# Patient Record
Sex: MALE | Race: BLACK OR AFRICAN AMERICAN | NOT HISPANIC OR LATINO | URBAN - METROPOLITAN AREA
[De-identification: names, ages, dates, MRNs, and addresses within clinical notes are randomized per-mention and may not be internally consistent; named-entity substitution may affect disease eponyms.]

---

## 2020-02-25 ENCOUNTER — EMERGENCY (EMERGENCY)
Facility: HOSPITAL | Age: 60
LOS: 1 days | Discharge: HOME | End: 2020-02-25
Attending: EMERGENCY MEDICINE | Admitting: EMERGENCY MEDICINE
Payer: MEDICAID

## 2020-02-25 VITALS
SYSTOLIC BLOOD PRESSURE: 129 MMHG | DIASTOLIC BLOOD PRESSURE: 80 MMHG | HEART RATE: 88 BPM | RESPIRATION RATE: 16 BRPM | TEMPERATURE: 99 F | OXYGEN SATURATION: 100 %

## 2020-02-25 VITALS
DIASTOLIC BLOOD PRESSURE: 93 MMHG | OXYGEN SATURATION: 100 % | RESPIRATION RATE: 18 BRPM | TEMPERATURE: 99 F | HEART RATE: 101 BPM | SYSTOLIC BLOOD PRESSURE: 124 MMHG

## 2020-02-25 LAB
ALBUMIN SERPL ELPH-MCNC: 2.8 G/DL — LOW (ref 3.3–5)
ALP SERPL-CCNC: 57 U/L — SIGNIFICANT CHANGE UP (ref 40–120)
ALT FLD-CCNC: 10 U/L — SIGNIFICANT CHANGE UP (ref 4–41)
ANION GAP SERPL CALC-SCNC: 9 MMO/L — SIGNIFICANT CHANGE UP (ref 7–14)
AST SERPL-CCNC: 14 U/L — SIGNIFICANT CHANGE UP (ref 4–40)
BASOPHILS # BLD AUTO: 0.01 K/UL — SIGNIFICANT CHANGE UP (ref 0–0.2)
BASOPHILS NFR BLD AUTO: 0.2 % — SIGNIFICANT CHANGE UP (ref 0–2)
BILIRUB SERPL-MCNC: 0.3 MG/DL — SIGNIFICANT CHANGE UP (ref 0.2–1.2)
BUN SERPL-MCNC: 12 MG/DL — SIGNIFICANT CHANGE UP (ref 7–23)
CA-I BLD-SCNC: 1.32 MMOL/L — HIGH (ref 1.03–1.23)
CALCIUM SERPL-MCNC: 10.3 MG/DL — SIGNIFICANT CHANGE UP (ref 8.4–10.5)
CHLORIDE SERPL-SCNC: 100 MMOL/L — SIGNIFICANT CHANGE UP (ref 98–107)
CO2 SERPL-SCNC: 23 MMOL/L — SIGNIFICANT CHANGE UP (ref 22–31)
CREAT SERPL-MCNC: 0.98 MG/DL — SIGNIFICANT CHANGE UP (ref 0.5–1.3)
EOSINOPHIL # BLD AUTO: 0 K/UL — SIGNIFICANT CHANGE UP (ref 0–0.5)
EOSINOPHIL NFR BLD AUTO: 0 % — SIGNIFICANT CHANGE UP (ref 0–6)
GLUCOSE SERPL-MCNC: 109 MG/DL — HIGH (ref 70–99)
HCT VFR BLD CALC: 34.2 % — LOW (ref 39–50)
HGB BLD-MCNC: 10.7 G/DL — LOW (ref 13–17)
IMM GRANULOCYTES NFR BLD AUTO: 0.2 % — SIGNIFICANT CHANGE UP (ref 0–1.5)
LYMPHOCYTES # BLD AUTO: 1.93 K/UL — SIGNIFICANT CHANGE UP (ref 1–3.3)
LYMPHOCYTES # BLD AUTO: 36.6 % — SIGNIFICANT CHANGE UP (ref 13–44)
MCHC RBC-ENTMCNC: 30.7 PG — SIGNIFICANT CHANGE UP (ref 27–34)
MCHC RBC-ENTMCNC: 31.3 % — LOW (ref 32–36)
MCV RBC AUTO: 98 FL — SIGNIFICANT CHANGE UP (ref 80–100)
MONOCYTES # BLD AUTO: 0.41 K/UL — SIGNIFICANT CHANGE UP (ref 0–0.9)
MONOCYTES NFR BLD AUTO: 7.8 % — SIGNIFICANT CHANGE UP (ref 2–14)
NEUTROPHILS # BLD AUTO: 2.92 K/UL — SIGNIFICANT CHANGE UP (ref 1.8–7.4)
NEUTROPHILS NFR BLD AUTO: 55.2 % — SIGNIFICANT CHANGE UP (ref 43–77)
NRBC # FLD: 0 K/UL — SIGNIFICANT CHANGE UP (ref 0–0)
PHOSPHATE SERPL-MCNC: 3.8 MG/DL — SIGNIFICANT CHANGE UP (ref 2.5–4.5)
PLATELET # BLD AUTO: 331 K/UL — SIGNIFICANT CHANGE UP (ref 150–400)
PMV BLD: 8.9 FL — SIGNIFICANT CHANGE UP (ref 7–13)
POTASSIUM SERPL-MCNC: 4.5 MMOL/L — SIGNIFICANT CHANGE UP (ref 3.5–5.3)
POTASSIUM SERPL-SCNC: 4.5 MMOL/L — SIGNIFICANT CHANGE UP (ref 3.5–5.3)
PROT SERPL-MCNC: 12.8 G/DL — HIGH (ref 6–8.3)
RBC # BLD: 3.49 M/UL — LOW (ref 4.2–5.8)
RBC # FLD: 14.3 % — SIGNIFICANT CHANGE UP (ref 10.3–14.5)
SODIUM SERPL-SCNC: 132 MMOL/L — LOW (ref 135–145)
WBC # BLD: 5.28 K/UL — SIGNIFICANT CHANGE UP (ref 3.8–10.5)
WBC # FLD AUTO: 5.28 K/UL — SIGNIFICANT CHANGE UP (ref 3.8–10.5)

## 2020-02-25 PROCEDURE — 72192 CT PELVIS W/O DYE: CPT | Mod: 26

## 2020-02-25 PROCEDURE — 99284 EMERGENCY DEPT VISIT MOD MDM: CPT

## 2020-02-25 RX ORDER — OXYCODONE HYDROCHLORIDE 5 MG/1
5 TABLET ORAL ONCE
Refills: 0 | Status: DISCONTINUED | OUTPATIENT
Start: 2020-02-25 | End: 2020-02-25

## 2020-02-25 RX ADMIN — OXYCODONE HYDROCHLORIDE 5 MILLIGRAM(S): 5 TABLET ORAL at 11:15

## 2020-02-25 NOTE — ED PROVIDER NOTE - ATTENDING CONTRIBUTION TO CARE
I performed a history and physical exam of the patient and discussed their management with the resident and /or advanced care provider. I reviewed the resident and /or ACP's note and agree with the documented findings and plan of care. My medical decision making and observations are found above.  Lungs clear, nl speech. can walk but with pain.

## 2020-02-25 NOTE — ED PROVIDER NOTE - OBJECTIVE STATEMENT
59M, hx multiple myeloma (not currently on treatment), HTN presents w chief complaint of low back pain with BL hip pain. Patient reports few months of ongoing intermittent low back pain, with radiation to BL hips. Pain has acutely worsened since Sunday. Patient with worsening pain, worse pain with ambulation. No reported recent falls, trauma, heavy lifting. No reported fevers or chills, nausea or vomiting, headache, dizziness, lightheadedness, blurry vision, chest pain, cough, shortness of breath, diarrhea, constipation, dysuria, hematuria, incontience, numbness or weakness to lower extremities. No reported allergies. Former smoker. Took oxycodone last week, no painmeds this week yet.

## 2020-02-25 NOTE — ED PROVIDER NOTE - PROGRESS NOTE DETAILS
Patient resting in bed, moderate pain improvement  CT with "1.  Diffuse lytic lesions throughout the pelvis and lower lumbar spine with a permeative appearance of the overlying cortex suspicious for diffuse metastatic disease versus myeloma. 2.  Pathologic compression fracture of the L4 vertebra 3.  Severe right and moderate left hip arthrosis." Patient resting in bed, moderate pain improvement  CT with "1.  Diffuse lytic lesions throughout the pelvis and lower lumbar spine with a permeative appearance of the overlying cortex suspicious for diffuse metastatic disease versus myeloma. 2.  Pathologic compression fracture of the L4 vertebra 3.  Severe right and moderate left hip arthrosis."  Will d/c home at this time with Heme/Onc follow up. patient understands and in agreement w plan  return precautions given to patient   Darvin Erickson MD, PGY3 Emergency Medicine

## 2020-02-25 NOTE — ED PROVIDER NOTE - PATIENT PORTAL LINK FT
You can access the FollowMyHealth Patient Portal offered by [facility] by registering at the following website: [email or web address]. By joining [employer]’s FollowMyHealth portal, you will also be able to view your health information using other applications (apps) compatible with our system.

## 2020-02-25 NOTE — ED PROVIDER NOTE - CLINICAL SUMMARY MEDICAL DECISION MAKING FREE TEXT BOX
Dimitrios: pt with mult myeloma with increasing low back/ pelvis pain. no fever, no neuro changes. will treat pain and image will look at labe for kidney fxn and Ca.

## 2020-02-25 NOTE — PROVIDER CONTACT NOTE (OTHER) - ASSESSMENT
marlene met with the pt at bedside.  Pt requesting assistance with applying for food stamps.  marlene gave packet on process to apply for SNAP.  marlene also gave pt info on pantries in [place] as well as [employer].  marlene sent a email to [employer]ne[employer] requesting a MEP worker to visit with pt.

## 2020-02-25 NOTE — ED ADULT TRIAGE NOTE - CHIEF COMPLAINT QUOTE
pt with multiple myeloma, c/o back pain radiating to the hips.  pt is non-compliant with [place] meds and HTN medication

## 2020-02-25 NOTE — ED ADULT NURSE REASSESSMENT NOTE - NS ED NURSE REASSESS COMMENT FT1
Received report from TALISHA Lemus. Pt [id number]. Pt to be discharged at this time. NAD at present. Pt requesting to speak to  regarding benefits such as food stamps. Meseret GUILLORY contacted and will come speak to pt prior to DC.

## 2020-02-25 NOTE — ED ADULT NURSE NOTE - NSIMPLEMENTINTERV_GEN_ALL_ED
Implemented All Fall Risk Interventions:  [place] to call system. Call bell, personal items and telephone within reach. Instruct patient to call for assistance. Room bathroom lighting operational. Non-slip footwear when patient is off stretcher. Physically safe environment: no spills, clutter or unnecessary equipment. Stretcher in lowest position, wheels locked, appropriate side rails in place. Provide visual cue, wrist band, yellow gown, etc. Monitor gait and stability. Monitor for mental status changes and reorient to person, place, and time. Review medications for side effects contributing to fall risk. Reinforce activity limits and safety measures with patient and family.

## 2020-02-25 NOTE — ED ADULT NURSE NOTE - OBJECTIVE STATEMENT
Pt received to room AAO x 3 c/o lower back pain and bilateral hip pain x few days that is worse the past few days and unrelieved by oxycodone. Pt denies any recent injury or truama. No weakness noted, pt able to ambulate short distance with pain. PMH multiple myeloma not receiving treatment and HTN.

## 2020-02-25 NOTE — ED PROVIDER NOTE - NSFOLLOWUPINSTRUCTIONS_ED_ALL_ED_FT
Please follow up with your primary care physician for further care and evaluation.  If testing was performed in the Emergency Department, please bring copies of all test results to your doctor.  Please continue to take all home medications as previously prescribed.    PLEASE FOLLOW UP WITH HEMATOLOGY AND ONCOLOGY FOR FURTHER CARE    Return to hospital for any new or concerning symptoms, including but not limited to: fevers, chills, nausea, vomiting, headache, dizziness, lightheadedness, chest pain, shortness of breath, difficulty breathing, abdominal pain, weakness, or any other new or concerning symptoms.   ===============    To control your pain and/or fever at home, you can take Ibuprofen ('Motrin') 600 mg along with Tylenol 650mg to 1000mg every 6 to 8 hours. Limit your maximum daily Tylenol from all sources to 4000mg. Be aware that many other medications contain acetaminophen which is also known as Tylenol.     If taking Motrin/Ibuprofen, please take with food to minimize risk of stomach./gastric upset and/or injury.  Do not take Motrin/Ibuprofen if you have a history of gastritis and/or gastrointestinal bleed.   If you have a history of kidney disease: please use caution with the extended use of motrin/ibuprofen - speak with your primary care doctor further regarding the use of this medication.  If you have a known history of liver disease: please use caution with the extended use of tylenol/acetaminophen - speak to your primary care doctor further regarding the use of this medication.     Taking Tylenol and Ibuprofen together has been shown to be more effective at relieving pain than taking them separately. These are both over the counter medications that you can  at your local pharmacy without a prescription. You need to respect all of the warnings on the bottles. You shouldn’t take these medications for more than a week without following up with your doctor. Both medications come with certain risks and side effects that you need to discuss with your doctor, especially if you are taking them for a prolonged period.

## 2020-02-25 NOTE — ED PROVIDER NOTE - PHYSICAL EXAMINATION
General: Well appearing, awake, alert, oriented, no acute distress. Resting in bed.  HEENT: PERRLA EOMI. No trauma/bruising noted to head or face. No rhinorrhea noted.   CV: mild tachy rate and reg rhythm, S1/S2, no murmurs/rubs/gallops noted on exam. No tenderness to palpation to chest wall.   Lungs: Clear to ascultation bilaterally, no wheezes/crackles/rales noted on exam. Equal chest wall excursion noted.   Abdomen: Soft, non tender, non distended, no guarding or rebound. No CVA tenderness to palpation.   MSK: Intact ROM of upper and lower extremities bilaterally. Paint to left low back to straight leg raise LLE. No tenderness to palpation to extremities. Intact ROM of neck. No gross deformities noted to extremities. No C-spine or spinal bony tenderness to palpation. Mild BL paraspinal tenderness to palpation. Pelvis stable. Neck supple.   Neuro: Awake, A+O x4, moving all extremities spontaneously. CN 2-12 grossly intact. No nystagmus noted. Strength and sensation grossly intact to all extremities. Ambulatory w/o assist. Speech fluent, no slurred speech.   Extremities: No swelling or edema noted to extremities. No calf tenderness to palpation.   Skin: No rash noted on exam.

## 2020-03-01 ENCOUNTER — OUTPATIENT (OUTPATIENT)
Dept: OUTPATIENT SERVICES | Facility: HOSPITAL | Age: 60
LOS: 1 days | End: 2020-03-01

## 2020-03-28 ENCOUNTER — INPATIENT (INPATIENT)
Facility: HOSPITAL | Age: 60
LOS: 11 days | Discharge: HOME HEALTH | End: 2020-04-09
Attending: STUDENT IN AN ORGANIZED HEALTH CARE EDUCATION/TRAINING PROGRAM | Admitting: STUDENT IN AN ORGANIZED HEALTH CARE EDUCATION/TRAINING PROGRAM
Payer: MEDICAID

## 2020-03-28 VITALS
RESPIRATION RATE: 16 BRPM | SYSTOLIC BLOOD PRESSURE: 158 MMHG | TEMPERATURE: 98 F | DIASTOLIC BLOOD PRESSURE: 109 MMHG | HEART RATE: 110 BPM | OXYGEN SATURATION: 100 %

## 2020-03-28 DIAGNOSIS — C90.00 MULTIPLE MYELOMA NOT HAVING ACHIEVED REMISSION: ICD-10-CM

## 2020-03-28 PROBLEM — I10 ESSENTIAL (PRIMARY) HYPERTENSION: Chronic | Status: ACTIVE | Noted: 2020-02-25

## 2020-03-28 LAB
ALBUMIN SERPL ELPH-MCNC: 2.8 G/DL — LOW (ref 3.3–5)
ALP SERPL-CCNC: 53 U/L — SIGNIFICANT CHANGE UP (ref 40–120)
ALT FLD-CCNC: < 5 U/L — SIGNIFICANT CHANGE UP (ref 4–41)
ANION GAP SERPL CALC-SCNC: 7 MMO/L — SIGNIFICANT CHANGE UP (ref 7–14)
AST SERPL-CCNC: 7 U/L — SIGNIFICANT CHANGE UP (ref 4–40)
BASOPHILS # BLD AUTO: 0 K/UL — SIGNIFICANT CHANGE UP (ref 0–0.2)
BASOPHILS NFR BLD AUTO: 0 % — SIGNIFICANT CHANGE UP (ref 0–2)
BILIRUB SERPL-MCNC: < 0.2 MG/DL — LOW (ref 0.2–1.2)
BUN SERPL-MCNC: 10 MG/DL — SIGNIFICANT CHANGE UP (ref 7–23)
CA-I BLD-SCNC: 1.22 MMOL/L — SIGNIFICANT CHANGE UP (ref 1.03–1.23)
CALCIUM SERPL-MCNC: 9.5 MG/DL — SIGNIFICANT CHANGE UP (ref 8.4–10.5)
CHLORIDE SERPL-SCNC: 100 MMOL/L — SIGNIFICANT CHANGE UP (ref 98–107)
CK SERPL-CCNC: 91 U/L — SIGNIFICANT CHANGE UP (ref 30–200)
CO2 SERPL-SCNC: 26 MMOL/L — SIGNIFICANT CHANGE UP (ref 22–31)
CREAT SERPL-MCNC: 0.84 MG/DL — SIGNIFICANT CHANGE UP (ref 0.5–1.3)
EOSINOPHIL # BLD AUTO: 0.01 K/UL — SIGNIFICANT CHANGE UP (ref 0–0.5)
EOSINOPHIL NFR BLD AUTO: 0.1 % — SIGNIFICANT CHANGE UP (ref 0–6)
GLUCOSE SERPL-MCNC: 131 MG/DL — HIGH (ref 70–99)
HCT VFR BLD CALC: 29.4 % — LOW (ref 39–50)
HGB BLD-MCNC: 9.4 G/DL — LOW (ref 13–17)
IMM GRANULOCYTES NFR BLD AUTO: 0.5 % — SIGNIFICANT CHANGE UP (ref 0–1.5)
LYMPHOCYTES # BLD AUTO: 2.59 K/UL — SIGNIFICANT CHANGE UP (ref 1–3.3)
LYMPHOCYTES # BLD AUTO: 33.1 % — SIGNIFICANT CHANGE UP (ref 13–44)
MAGNESIUM SERPL-MCNC: 1.7 MG/DL — SIGNIFICANT CHANGE UP (ref 1.6–2.6)
MCHC RBC-ENTMCNC: 31.4 PG — SIGNIFICANT CHANGE UP (ref 27–34)
MCHC RBC-ENTMCNC: 32 % — SIGNIFICANT CHANGE UP (ref 32–36)
MCV RBC AUTO: 98.3 FL — SIGNIFICANT CHANGE UP (ref 80–100)
MONOCYTES # BLD AUTO: 0.47 K/UL — SIGNIFICANT CHANGE UP (ref 0–0.9)
MONOCYTES NFR BLD AUTO: 6 % — SIGNIFICANT CHANGE UP (ref 2–14)
NEUTROPHILS # BLD AUTO: 4.71 K/UL — SIGNIFICANT CHANGE UP (ref 1.8–7.4)
NEUTROPHILS NFR BLD AUTO: 60.3 % — SIGNIFICANT CHANGE UP (ref 43–77)
NRBC # FLD: 0.03 K/UL — SIGNIFICANT CHANGE UP (ref 0–0)
PHOSPHATE SERPL-MCNC: 3.1 MG/DL — SIGNIFICANT CHANGE UP (ref 2.5–4.5)
PLATELET # BLD AUTO: 376 K/UL — SIGNIFICANT CHANGE UP (ref 150–400)
PMV BLD: 9 FL — SIGNIFICANT CHANGE UP (ref 7–13)
POTASSIUM SERPL-MCNC: 3.6 MMOL/L — SIGNIFICANT CHANGE UP (ref 3.5–5.3)
POTASSIUM SERPL-SCNC: 3.6 MMOL/L — SIGNIFICANT CHANGE UP (ref 3.5–5.3)
PROT SERPL-MCNC: 13.6 G/DL — HIGH (ref 6–8.3)
RBC # BLD: 2.99 M/UL — LOW (ref 4.2–5.8)
RBC # FLD: 14.6 % — HIGH (ref 10.3–14.5)
SODIUM SERPL-SCNC: 133 MMOL/L — LOW (ref 135–145)
WBC # BLD: 7.82 K/UL — SIGNIFICANT CHANGE UP (ref 3.8–10.5)
WBC # FLD AUTO: 7.82 K/UL — SIGNIFICANT CHANGE UP (ref 3.8–10.5)

## 2020-03-28 RX ORDER — MORPHINE SULFATE 50 MG/1
2 CAPSULE, EXTENDED RELEASE ORAL ONCE
Refills: 0 | Status: DISCONTINUED | OUTPATIENT
Start: 2020-03-28 | End: 2020-03-28

## 2020-03-28 RX ADMIN — MORPHINE SULFATE 2 MILLIGRAM(S): 50 CAPSULE, EXTENDED RELEASE ORAL at 19:50

## 2020-03-28 RX ADMIN — MORPHINE SULFATE 2 MILLIGRAM(S): 50 CAPSULE, EXTENDED RELEASE ORAL at 18:55

## 2020-03-28 NOTE — ED ADULT NURSE NOTE - NSIMPLEMENTINTERV_GEN_ALL_ED
Implemented All Universal Safety Interventions:  [place] to call system. Call bell, personal items and telephone within reach. Instruct patient to call for assistance. Room bathroom lighting operational. Non-slip footwear when patient is off stretcher. Physically safe environment: no spills, clutter or unnecessary equipment. Stretcher in lowest position, wheels locked, appropriate side rails in place.

## 2020-03-28 NOTE — ED PROVIDER NOTE - OBJECTIVE STATEMENT
Pt is a 58 yo M w/ PMHx multiple myeloma (not currently on treatment), HTN presents w chief complaint R sided rib pain and R hip pain. Pt states he has had the pain for a number of weeks but it became worse today. He is not able to ambulate on account of the pain. Denies LE weakness, numbness, tingling, no bowel/fecal incontinence. Pt does not follow with H/O for his MM.  No recent trauma. He was seen in the ED last in February with a similar complaint Pt is a 60 yo M w/ PMHx multiple myeloma (not currently on treatment), HTN presents w chief complaint R sided rib pain and R hip pain. Pt states he has had the pain for a number of weeks but it became worse today. He is not able to ambulate on account of the pain. Denies LE weakness, numbness, tingling, no bowel/fecal incontinence. Pt does not follow with H/O for his MM.  No recent trauma. He was seen in the ED last in February with a similar complaint, CT pelvis at the time showed diffuse lytic lesions throughout the pelvis and lower lumbar spine, pathologic compression fracture of L4, and severe R and moderate L hip arthrosis.

## 2020-03-28 NOTE — ED PROVIDER NOTE - PHYSICAL EXAMINATION
GENERAL: uncomfortable  HEAD:  Atraumatic, Normocephalic  EYES: EOMI, conjunctiva and sclera clear  ENMT: Moist mucous membranes  NECK: Supple   HEART: Regular rate and rhythm; No murmurs, rubs, or gallops  RESPIRATORY: CTA B/L   ABDOMEN: Soft, Nontender, Nondistended   NEUROLOGY: A&Ox3, nonfocal, moving all extremities. reduced strength   EXTREMITIES:  2+ Peripheral Pulses, No clubbing, cyanosis, or edema. no gross deformities. 4/5 strength b/l LE  SKIN: warm, dry, normal color, no rash

## 2020-03-28 NOTE — ED PROVIDER NOTE - ATTENDING CONTRIBUTION TO CARE
Dr. Barron:  I have personally performed a face to face bedside history and physical examination of this patient. I have discussed the history, examination, review of systems, assessment and plan of management with the resident. I have reviewed the electronic medical record and amended it to reflect my history, review of systems, physical exam, assessment and plan.    59M h/o multiple myeloma not on treatment x 1 year, htn, presents with worsening right rib cage and hip pain over past few weeks.  States he came in because pain worse today and has not been able to ambulate.  Last seen in February and had CT showing "1.  Diffuse lytic lesions throughout the pelvis and lower lumbar spine with a permeative appearance of the overlying cortex suspicious for diffuse metastatic disease versus myeloma. 2.  Pathologic compression fracture of the L4 vertebra.  3.  Severe right and moderate left hip arthrosis."      Exam:  - nad, cachectic  - rrr  - ctab  - abd soft ntnd  - 4/5 strength BLE, no sensory deficits, no incontinence    A/P  - pain likely secondary to pathologic fractures and lytic lesions  - as unable to ambulate, admit for pain control and further management

## 2020-03-28 NOTE — ED ADULT NURSE NOTE - OBJECTIVE STATEMENT
Received spot 12 B.  AOx4, skin warm, dry and intact.  Pt presents with c/o difficulty walking x 1 week with pain to lower back and shoulder.  IV access obtained.  Labs drawn and sent.  Pt medicated for pain as per EMAR.  Currently eating food tray provide

## 2020-03-28 NOTE — ED PROVIDER NOTE - NS ED ROS FT
CONSTITUTIONAL: No weakness, fevers or chills  EYES/ENT: No visual changes   NECK: No pain or stiffness  RESPIRATORY: No cough, SOB  CARDIOVASCULAR: No chest pain or palpitations  GASTROINTESTINAL: No abdominal or epigastric pain. No nausea, vomiting, or hematemesis; No diarrhea or constipation. No melena or hematochezia.  GENITOURINARY: No dysuria, frequency or hematuria  NEUROLOGICAL: No numbness or weakness  SKIN: No rashes

## 2020-03-29 DIAGNOSIS — C90.00 MULTIPLE MYELOMA NOT HAVING ACHIEVED REMISSION: ICD-10-CM

## 2020-03-29 DIAGNOSIS — Z29.9 ENCOUNTER FOR PROPHYLACTIC MEASURES, UNSPECIFIED: ICD-10-CM

## 2020-03-29 DIAGNOSIS — I10 ESSENTIAL (PRIMARY) HYPERTENSION: ICD-10-CM

## 2020-03-29 DIAGNOSIS — R62.7 ADULT FAILURE TO THRIVE: ICD-10-CM

## 2020-03-29 DIAGNOSIS — R52 PAIN, UNSPECIFIED: ICD-10-CM

## 2020-03-29 PROCEDURE — 12345: CPT | Mod: NC

## 2020-03-29 PROCEDURE — 99223 1ST HOSP IP/OBS HIGH 75: CPT

## 2020-03-29 RX ORDER — OXYCODONE HYDROCHLORIDE 5 MG/1
5 TABLET ORAL EVERY 4 HOURS
Refills: 0 | Status: DISCONTINUED | OUTPATIENT
Start: 2020-03-29 | End: 2020-04-02

## 2020-03-29 RX ORDER — INFLUENZA VIRUS VACCINE 15; 15; 15; 15 UG/.5ML; UG/.5ML; UG/.5ML; UG/.5ML
0.5 SUSPENSION INTRAMUSCULAR ONCE
Refills: 0 | Status: DISCONTINUED | OUTPATIENT
Start: 2020-03-29 | End: 2020-04-09

## 2020-03-29 RX ORDER — MORPHINE SULFATE 50 MG/1
2 CAPSULE, EXTENDED RELEASE ORAL
Refills: 0 | Status: DISCONTINUED | OUTPATIENT
Start: 2020-03-29 | End: 2020-04-02

## 2020-03-29 RX ORDER — SODIUM CHLORIDE 9 MG/ML
1000 INJECTION INTRAMUSCULAR; INTRAVENOUS; SUBCUTANEOUS
Refills: 0 | Status: DISCONTINUED | OUTPATIENT
Start: 2020-03-29 | End: 2020-04-04

## 2020-03-29 RX ORDER — ACETAMINOPHEN 500 MG
650 TABLET ORAL EVERY 4 HOURS
Refills: 0 | Status: DISCONTINUED | OUTPATIENT
Start: 2020-03-29 | End: 2020-04-09

## 2020-03-29 RX ORDER — HEPARIN SODIUM 5000 [USP'U]/ML
5000 INJECTION INTRAVENOUS; SUBCUTANEOUS EVERY 8 HOURS
Refills: 0 | Status: DISCONTINUED | OUTPATIENT
Start: 2020-03-29 | End: 2020-04-09

## 2020-03-29 RX ADMIN — OXYCODONE HYDROCHLORIDE 5 MILLIGRAM(S): 5 TABLET ORAL at 00:41

## 2020-03-29 RX ADMIN — HEPARIN SODIUM 5000 UNIT(S): 5000 INJECTION INTRAVENOUS; SUBCUTANEOUS at 15:50

## 2020-03-29 RX ADMIN — HEPARIN SODIUM 5000 UNIT(S): 5000 INJECTION INTRAVENOUS; SUBCUTANEOUS at 05:03

## 2020-03-29 RX ADMIN — OXYCODONE HYDROCHLORIDE 5 MILLIGRAM(S): 5 TABLET ORAL at 20:56

## 2020-03-29 RX ADMIN — Medication 650 MILLIGRAM(S): at 09:48

## 2020-03-29 RX ADMIN — OXYCODONE HYDROCHLORIDE 5 MILLIGRAM(S): 5 TABLET ORAL at 22:11

## 2020-03-29 RX ADMIN — SODIUM CHLORIDE 100 MILLILITER(S): 9 INJECTION INTRAMUSCULAR; INTRAVENOUS; SUBCUTANEOUS at 03:11

## 2020-03-29 RX ADMIN — Medication 650 MILLIGRAM(S): at 09:18

## 2020-03-29 NOTE — H&P ADULT - PROBLEM SELECTOR PLAN 3
-Patient reports not currently receiving treatment  -Likely needs heme/onc follow up to establish care

## 2020-03-29 NOTE — PROGRESS NOTE ADULT - PROBLEM SELECTOR PLAN 3
-Patient reports not currently receiving treatment  -plan to contact Dr Knox ([facility]) pt's hematologist to evaulate pt if he comes to [facility]; otherwise house heme -Patient reports not currently receiving treatment  -plan to contact Dr Knox ([facility]) pt's hematologist to evaluate pt if he comes to [facility]; otherwise house heme

## 2020-03-29 NOTE — H&P ADULT - NSHPPHYSICALEXAM_GEN_ALL_CORE
Physical exam:  Vital signs reviewed as below:  T(C): 37.1 (03-29-20 @ 00:36), Max: 37.5 (03-28-20 @ 19:08)  HR: 94 (03-29-20 @ 00:36) (94 - 110)  BP: 149/103 (03-29-20 @ 00:36) (135/88 - 158/109)  RR: 16 (03-29-20 @ 00:36) (16 - 19)  SpO2: 100% (03-29-20 @ 00:36) (98% - 100%)  Constitutional-awake, alert, not in acute distress  Neuro-awake, alert,  appropriately interactive, moving all extremities, face symmetric  Eyes-pupils equally round and reactive to light, non icteric, no discharge noted  Ears, nose, mouth, throat-no abnormal discharge, moist mucous membranes, oropharynx clear without noted exudate  CV-tachy rate and regular rhythm, no rubs, murmurs, gallops appreciated, no lower extremity edema, chest pain reproduced on exam  Resp-clear to auscultation bilaterally, normal respiratory effort,   GI-abdomen soft and nontender, no rebound or guarding present  -not examined  MSK-pain in rib and hip reproduced with palpation  Skin-warm, dry, no rash appreciated  Psych-calm, cooperative, normal affect, not attending to internal stimuli

## 2020-03-29 NOTE — CHART NOTE - NSCHARTNOTEFT_GEN_A_CORE
House oncology consulted. However, pt was previously followed by Dr. Terry ([phone], [facility] in [place]). Called office to discuss with Dr. Terry however office closed. Per discussion with onc fellow, Dr. Terry may have privileges at [facility]. Primary team to follow up tomorrow with Dr. Terry, if he has privileges here then he will resume care of patient. If not, then house oncology can take over.

## 2020-03-29 NOTE — PROGRESS NOTE ADULT - SUBJECTIVE AND OBJECTIVE BOX
[facility] Division of Hospital Medicine  Ozzy Canas MD  Pager [phone]    Patient is a 59y old  Male who presents with a chief complaint of CC: pain (29 Mar 2020 00:43)      SUBJECTIVE / OVERNIGHT EVENTS: Pain controlled      MEDICATIONS  (STANDING):  heparin  Injectable 5000 Unit(s) SubCutaneous every 8 hours  influenza   Vaccine 0.5 milliLiter(s) IntraMuscular once  sodium chloride 0.9%. 1000 milliLiter(s) (100 mL/Hr) IV Continuous <Continuous>    MEDICATIONS  (PRN):  acetaminophen   Tablet .. 650 milliGRAM(s) Oral every 4 hours PRN Mild Pain (1 - 3)  morphine  - Injectable 2 milliGRAM(s) IV Push every 2 hours PRN Severe Pain (7 - 10)  oxyCODONE    IR 5 milliGRAM(s) Oral every 4 hours PRN Moderate Pain (4 - 6)      CAPILLARY BLOOD GLUCOSE        I&O's Summary      PHYSICAL EXAM:  Vital Signs Last 24 Hrs  T(C): 36.8 (29 Mar 2020 15:54), Max: 37.5 (28 Mar 2020 19:08)  T(F): 98.2 (29 Mar 2020 15:54), Max: 99.5 (28 Mar 2020 19:08)  HR: 93 (29 Mar 2020 15:54) (71 - 102)  BP: 122/81 (29 Mar 2020 15:54) (122/81 - 150/100)  BP(mean): --  RR: 17 (29 Mar 2020 15:54) (16 - 19)  SpO2: 100% (29 Mar 2020 15:54) (96% - 100%)  CONSTITUTIONAL: NAD  EYES: EOMI, conjunctiva and sclera clear  ENMT: Moist oral mucosa  NECK: Supple  RESPIRATORY: Breathing unlabored, CTAB  CARDIOVASCULAR: S1S2 no MRG  ABDOMEN: Nontender to palpation, normoactive bowel sounds, no rebound/guarding  MUSCULOSKELETAL: no clubbing or cyanosis of digits  NEUROLOGY: No focal deficits , no signs cord compression  SKIN: No rashes or lesions    LABS:                        9.4    7.82  )-----------( 376      ( 28 Mar 2020 18:50 )             29.4     03-28    133<L>  |  100  |  10  ----------------------------<  131<H>  3.6   |  26  |  0.84    Ca    9.5      28 Mar 2020 18:35  Phos  3.1     03-28  Mg     1.7     03-28    TPro  13.6<H>  /  Alb  2.8<L>  /  TBili  < 0.2<L>  /  DBili  x   /  AST  7   /  ALT  < 5  /  AlkPhos  53  03-28      CARDIAC MARKERS ( 28 Mar 2020 18:35 )  x     / x     / 91 u/L / x     / x        CODE: FULL

## 2020-03-29 NOTE — H&P ADULT - ASSESSMENT
59 year old man with a history of multiple myeloma (not in treatment) and HTN who presents for uncontrolled pain.

## 2020-03-29 NOTE — H&P ADULT - NSHPLABSRESULTS_GEN_ALL_CORE
Diagnostics reviewed and remarkable for:  CBC w/ hgb 9.4  BMP wnl  LFT w/ protein 13.6, albumin 2.8  Mg 1.7  Phos 3.1  CK - 91  CT pelvis from 2/25 with diffuse lytic lesions in the pelvis and lower lumbar spine, L4 compression fracture present, severe R and moderate L hip arthrosis

## 2020-03-29 NOTE — H&P ADULT - NSHPREVIEWOFSYSTEMS_GEN_ALL_CORE
ROS:  Constitutional:  (+) none; (-) fevers, chills, sweats, unintentional weight loss, fatigue, sick contacts, recent travel, trauma  Ears/Nose/Mouth/Throat: (+) none; (-) throat pain, rhinorrhea, dysphagia/odynophagia  CV: (+) chest pain, (-) palpitations, lower extremity edema, orthopnea, PND  Resp: (+) none; (-) shortness of breath, cough  GI: (+) none; (-) abdominal pain, nausea, vomitting, diarrhea, constipation, melana, hematochezia  : (+) none; (-) dysuria, hematuria  MSK: (+) R hip pain, R rib pain (-) joint swelling  Skin: (+) none; (-) rash, new yellowing/darkening of skin  Neuro: (+) HA, (-) vision changes, weakness, confusion, lightheadedness/dizziness, numbness, tingling, incontinence  Endo: (+) none; (-) heat/cold intolerance, recent skin/hair/nail changes  Heme/Lymph: (+) none; (-) swollen lymph nodes, night sweats

## 2020-03-29 NOTE — H&P ADULT - PROBLEM SELECTOR PLAN 1
-Likely related to known lytic bone lesions from MM and possibly with pathologic fracture  -Pain management prn orders in place  -Will get initial evaluation with XR chest and hip

## 2020-03-29 NOTE — H&P ADULT - PROBLEM SELECTOR PLAN 2
-Reports poor PO and inability to ambulate due to pain likely from MM  -Pain management  -Maintenance IV fluids  -If XR without fracture, consider PT/OT

## 2020-03-29 NOTE — H&P ADULT - HISTORY OF PRESENT ILLNESS
Rolf Robert is a 59 year old man with a history of multiple myeloma (not in treatment) and HTN who presents for uncontrolled pain.    He was recently in the ED with similar complaints with a CT pelvis 2/25 showing diffuse lytic lesions in the pelvis and lower lumbar spine and an L4 compression fracture, also severe R and moderate L hip arthrosis.  He was discharged home with plan for oncology follow up.    Regarding his pain, he describes months of sharp pain worsening over the last 2-3 weeks and uncontrolled by the advil that he was trying at home.  He denies any fevers, chills, cough. Rates it as an 8/10 pain worse with palpation.  Due to his pain, he has been unable to walk.  Due to this, he called EMS and was brought to [facility] for evaluation.    In the ED, he was afebrile, tachycardic to 102-110, with otherwise unremarkable vital signs.  Diagnostics revealed a Hgb 9.4, protein 13.6, albumin 2.8, CK 91.  He was given morphine admitted for further management.    On evaluation, he gave the above history.

## 2020-03-30 DIAGNOSIS — C90.00 MULTIPLE MYELOMA NOT HAVING ACHIEVED REMISSION: ICD-10-CM

## 2020-03-30 LAB
ANION GAP SERPL CALC-SCNC: 7 MMO/L — SIGNIFICANT CHANGE UP (ref 7–14)
BUN SERPL-MCNC: 10 MG/DL — SIGNIFICANT CHANGE UP (ref 7–23)
CALCIUM SERPL-MCNC: 9.7 MG/DL — SIGNIFICANT CHANGE UP (ref 8.4–10.5)
CHLORIDE SERPL-SCNC: 103 MMOL/L — SIGNIFICANT CHANGE UP (ref 98–107)
CO2 SERPL-SCNC: 23 MMOL/L — SIGNIFICANT CHANGE UP (ref 22–31)
CREAT SERPL-MCNC: 0.9 MG/DL — SIGNIFICANT CHANGE UP (ref 0.5–1.3)
GLUCOSE SERPL-MCNC: 87 MG/DL — SIGNIFICANT CHANGE UP (ref 70–99)
HCT VFR BLD CALC: 29.2 % — LOW (ref 39–50)
HCV AB S/CO SERPL IA: 0.04 S/CO — SIGNIFICANT CHANGE UP (ref 0–0.99)
HCV AB SERPL-IMP: SIGNIFICANT CHANGE UP
HGB BLD-MCNC: 9.2 G/DL — LOW (ref 13–17)
MCHC RBC-ENTMCNC: 30.8 PG — SIGNIFICANT CHANGE UP (ref 27–34)
MCHC RBC-ENTMCNC: 31.5 % — LOW (ref 32–36)
MCV RBC AUTO: 97.7 FL — SIGNIFICANT CHANGE UP (ref 80–100)
NRBC # FLD: 0 K/UL — SIGNIFICANT CHANGE UP (ref 0–0)
PLATELET # BLD AUTO: 347 K/UL — SIGNIFICANT CHANGE UP (ref 150–400)
PMV BLD: 8.8 FL — SIGNIFICANT CHANGE UP (ref 7–13)
POTASSIUM SERPL-MCNC: 3.7 MMOL/L — SIGNIFICANT CHANGE UP (ref 3.5–5.3)
POTASSIUM SERPL-SCNC: 3.7 MMOL/L — SIGNIFICANT CHANGE UP (ref 3.5–5.3)
RBC # BLD: 2.99 M/UL — LOW (ref 4.2–5.8)
RBC # FLD: 14.8 % — HIGH (ref 10.3–14.5)
SODIUM SERPL-SCNC: 133 MMOL/L — LOW (ref 135–145)
WBC # BLD: 5.78 K/UL — SIGNIFICANT CHANGE UP (ref 3.8–10.5)
WBC # FLD AUTO: 5.78 K/UL — SIGNIFICANT CHANGE UP (ref 3.8–10.5)

## 2020-03-30 PROCEDURE — 99233 SBSQ HOSP IP/OBS HIGH 50: CPT

## 2020-03-30 PROCEDURE — 73521 X-RAY EXAM HIPS BI 2 VIEWS: CPT | Mod: 26

## 2020-03-30 PROCEDURE — 71046 X-RAY EXAM CHEST 2 VIEWS: CPT | Mod: 26

## 2020-03-30 RX ADMIN — OXYCODONE HYDROCHLORIDE 5 MILLIGRAM(S): 5 TABLET ORAL at 13:25

## 2020-03-30 RX ADMIN — HEPARIN SODIUM 5000 UNIT(S): 5000 INJECTION INTRAVENOUS; SUBCUTANEOUS at 21:27

## 2020-03-30 RX ADMIN — HEPARIN SODIUM 5000 UNIT(S): 5000 INJECTION INTRAVENOUS; SUBCUTANEOUS at 05:12

## 2020-03-30 RX ADMIN — HEPARIN SODIUM 5000 UNIT(S): 5000 INJECTION INTRAVENOUS; SUBCUTANEOUS at 14:45

## 2020-03-30 NOTE — DIETITIAN INITIAL EVALUATION ADULT. - DIET TYPE
PO diet supplement (specify)/Ensure Enlive 8oz. 2x daily (will provide additional ~700 Kcal, ~40 gm Protein)/DASH/TLC (sodium and cholesterol restricted diet)

## 2020-03-30 NOTE — PROGRESS NOTE ADULT - SUBJECTIVE AND OBJECTIVE BOX
Patient with persistent pain in back and hip.  Patient has history of multiple myeloma.  he had one course of chemo but developed burning skin and stopped.  Transplant was recommended but the patient refused. Now with persistent pain causing difficulty walking.    Vital Signs Last 24 Hrs  T(C): 36.8 (31 Mar 2020 06:57), Max: 37.4 (30 Mar 2020 15:53)  T(F): 98.2 (31 Mar 2020 06:57), Max: 99.4 (30 Mar 2020 15:53)  HR: 85 (31 Mar 2020 06:57) (85 - 91)  BP: 133/94 (31 Mar 2020 06:57) (133/94 - 144/98)  BP(mean): --  RR: 18 (31 Mar 2020 06:57) (17 - 18)  SpO2: 98% (31 Mar 2020 06:57) (98% - 100%)  Daily     Daily Weight in k.6 (30 Mar 2020 11:50)  I&O's Summary      Neck: no bruits, JVD, adenopathy  Chest: clear  Cor: [id number], RR, normal S1S2 with no mrght  Abd: soft, nontender, BS+ and no HSM  Ext: w/o CCE  Pulses:2+->dp bilaterally    MEDICATIONS  (STANDING):  heparin  Injectable 5000 Unit(s) SubCutaneous every 8 hours  influenza   Vaccine 0.5 milliLiter(s) IntraMuscular once  sodium chloride 0.9%. 1000 milliLiter(s) (100 mL/Hr) IV Continuous <Continuous>    MEDICATIONS  (PRN):  acetaminophen   Tablet .. 650 milliGRAM(s) Oral every 4 hours PRN Mild Pain (1 - 3)  morphine  - Injectable 2 milliGRAM(s) IV Push every 2 hours PRN Severe Pain (7 - 10)  oxyCODONE    IR 5 milliGRAM(s) Oral every 4 hours PRN Moderate Pain (4 - 6)          132<L>  |  101  |  15  ----------------------------<  84  3.9   |  23  |  0.90    Ca    10.0      31 Mar 2020 05:00  Phos  4.3     03-31  Mg     1.8         TPro  12.6<H>  /  Alb  x   /  TBili  x   /  DBili  x   /  AST  x   /  ALT  x   /  AlkPhos  x                             9.3    5.96  )-----------( 393      ( 31 Mar 2020 05:00 )             28.9         HEALTH ISSUES - PROBLEM Dx:  Multiple myeloma: Pain likely secondary to MM. Heme consult requested (and consider radiation oncology)  Essential hypertension: reasonably well controlled

## 2020-03-30 NOTE — DIETITIAN INITIAL EVALUATION ADULT. - PROBLEM/PLAN-1
Final Anesthesia Post-op Assessment    Patient: Mercedes Hunt  Procedure(s) Performed: LABOR ANALGESIA  Anesthesia type: L&D Epidural    Vitals Value Taken Time   Temp 37.7 °C (99.9 °F) 3/28/2020  1:13 PM   Pulse 72 3/28/2020  1:13 PM   Resp 16 3/28/2020  1:13 PM   SpO2  3/28/2020  2:59 PM   /71 3/28/2020  1:13 PM       Last 24 I/O:     Intake/Output Summary (Last 24 hours) at 3/28/2020 1459  Last data filed at 3/28/2020 1046  Gross per 24 hour   Intake 785.16 ml   Output 1018 ml   Net -232.84 ml         Patient Location: bedside  Post-op Vital Signs:stable  Level of Consciousness: awake and alert  Respiratory Status: spontaneous ventilation  Cardiovascular stable  Hydration: euvolemic  Pain Management: adequately controlled  Handoff: Handoff to receiving nurse was performed and questions were answered  Nausea: None  Airway Patency:patent  Post-op Assessment: no complications and patient tolerated procedure well with no complications      
DISPLAY PLAN FREE TEXT

## 2020-03-30 NOTE — DIETITIAN INITIAL EVALUATION ADULT. - OTHER INFO
Pt 60 yo male with Multiple Myeloma, Failure to thrive in adult  - per chart review. Case discussed with nurse. Pt with poor appetite reported. RDN spoke to Pt's sister over the phone ([phone]). Per sister Pt's appetite has been poor for past several months; Pt lost weight during that time frame but unable to quantify. Food preferences discussed with sister. Will recommend to add PO supplement: Ensure - 2x daily. No report of chewing or swallowing difficulty; no report of nausea, vomiting or diarrhea @ this time. RDN remains available.

## 2020-03-30 NOTE — CHART NOTE - NSCHARTNOTEFT_GEN_A_CORE
Case discussed with Dr. Melendez, recommend heme/onc consult. Pts outpt heme/onc Dr. Vo does not come to [facility]. [place] hematology consult called, recommend to obtain images from Dr. Vo's office, release sent for further information, labs sent per heme request. Also recommended spine consult, neurosurgery covering spine today, recommend to obtain spine imaging. Discussed recommendations with Dr. Melendez who agrees, will order MRI C/T/L spine w/ and w/o david.

## 2020-03-30 NOTE — CONSULT NOTE ADULT - ASSESSMENT
58 y/o M with h/o MM found to have L4 compression fx and multiple lytic lesions. Heme/Onc f/u pending. Rolf Robert is a 59 year old man with a history of multiple myeloma dx in 2018 and htn, sp chemotherapy, no radiation. Currently admitted for LBP and rib pain for the past several weeks.  No ac antiplt.  No saddle anesthesia, urinary/fecal incontinence. No radiculopathy. CT showed L4 compression with diffuse lytic lesions.     -rad onc, heme onc consults  -CT CTL and MR CTL likely for radiation oncology purposes  -may reconsult neurosurgery as needed

## 2020-03-30 NOTE — CONSULT NOTE ADULT - SUBJECTIVE AND OBJECTIVE BOX
HPI:  Rolf Robert is a 59 year old man with a history of multiple myeloma (not in treatment) and HTN who presents for uncontrolled pain.    He was recently in the ED with similar complaints with a CT pelvis 2/25 showing diffuse lytic lesions in the pelvis and lower lumbar spine and an L4 compression fracture, also severe R and moderate L hip arthrosis.  He was discharged home with plan for oncology follow up.    Regarding his pain, he describes months of sharp pain worsening over the last 2-3 weeks and uncontrolled by the advil that he was trying at home.  He denies any fevers, chills, cough. Rates it as an 8/10 pain worse with palpation.  Due to his pain, he has been unable to walk.  Due to this, he called EMS and was brought to [facility] for evaluation.    In the ED, he was afebrile, tachycardic to 102-110, with otherwise unremarkable vital signs.  Diagnostics revealed a Hgb 9.4, protein 13.6, albumin 2.8, CK 91.  He was given morphine admitted for further management.  He denies any numbness, tingling, bowel or bladder incontinence.     PAST MEDICAL & SURGICAL HISTORY:  HTN (hypertension)  Multiple myeloma  No significant past surgical history    Allergies    No Known Allergies    Intolerances      acetaminophen   Tablet .. 650 milliGRAM(s) Oral every 4 hours PRN  heparin  Injectable 5000 Unit(s) SubCutaneous every 8 hours  influenza   Vaccine 0.5 milliLiter(s) IntraMuscular once  morphine  - Injectable 2 milliGRAM(s) IV Push every 2 hours PRN  oxyCODONE    IR 5 milliGRAM(s) Oral every 4 hours PRN  sodium chloride 0.9%. 1000 milliLiter(s) IV Continuous <Continuous>    SOCIAL HISTORY:  FAMILY HISTORY:  No pertinent family history in first degree relatives    Vital Signs Last 24 Hrs  T(C): 37.4 (30 Mar 2020 15:53), Max: 37.4 (30 Mar 2020 15:53)  T(F): 99.4 (30 Mar 2020 15:53), Max: 99.4 (30 Mar 2020 15:53)  HR: 91 (30 Mar 2020 15:53) (74 - 91)  BP: 143/97 (30 Mar 2020 15:53) (133/92 - 143/97)  BP(mean): --  RR: 17 (30 Mar 2020 15:53) (16 - 17)  SpO2: 100% (30 Mar 2020 15:53) (100% - 100%)    PHYSICAL EXAM:  Awake Alert Attentive Affect appropriate Ox3  PERRL EOMI  Motor:   Tone: normal.                  Strength:     [X] Upper extremity                      Delt       Bicep    Tricep                                                  R         5/5        5/5        5/5       5/5                                               L          5/5        5/5        5/5       5/5  [X] Lower extremity                       HF          KE          KF        DF         PF                                               R        5/5        5/5        5/5       5/5       5/5                                               L         5/5        5/5       5/5       5/5        5/5  Sensory Intact to Light Touch  Reflexes WNL, No clonus    LABS:                          9.2    5.78  )-----------( 347      ( 30 Mar 2020 06:25 )             29.2     03-30    133<L>  |  103  |  10  ----------------------------<  87  3.7   |  23  |  0.90    Ca    9.7      30 Mar 2020 06:25  Phos  3.1     03-28  Mg     1.7     03-28    TPro  13.6<H>  /  Alb  2.8<L>  /  TBili  < 0.2<L>  /  DBili  x   /  AST  7   /  ALT  < 5  /  AlkPhos  53  03-28          RADIOLOGY & ADDITIONAL STUDIES:    No acute fracture or dislocation is demonstrated. Multifocal lytic lesions throughout the bones, consistent with the patient's history of multiple myeloma.    Advanced right hip osteoarthritis.      2/25/20 CT L/s: IMPRESSION:    1.  Diffuse lytic lesions throughout the pelvis and lower lumbar spine with a permeative appearance of the overlying cortex suspicious for diffuse metastatic disease versus myeloma.  2.  Pathologic compression fracture of the L4 vertebra  3.  Severe right and moderate left hip arthrosis. HPI:  Rolf Robert is a 59 year old man with a history of multiple myeloma dx in 2018 and htn, sp chemotherapy, no radiation. Currently admitted for LBP and rib pain for the past several weeks.  No ac antiplt.  No saddle anesthesia, urinary/fecal incontinence. No radiculopathy. CT showed L4 compression with diffuse lytic lesions.      PAST MEDICAL & SURGICAL HISTORY:  HTN (hypertension)  Multiple myeloma  No significant past surgical history    Allergies    No Known Allergies    Intolerances      acetaminophen   Tablet .. 650 milliGRAM(s) Oral every 4 hours PRN  heparin  Injectable 5000 Unit(s) SubCutaneous every 8 hours  influenza   Vaccine 0.5 milliLiter(s) IntraMuscular once  morphine  - Injectable 2 milliGRAM(s) IV Push every 2 hours PRN  oxyCODONE    IR 5 milliGRAM(s) Oral every 4 hours PRN  sodium chloride 0.9%. 1000 milliLiter(s) IV Continuous <Continuous>    SOCIAL HISTORY:  FAMILY HISTORY:  No pertinent family history in first degree relatives    Vital Signs Last 24 Hrs  T(C): 37.4 (30 Mar 2020 15:53), Max: 37.4 (30 Mar 2020 15:53)  T(F): 99.4 (30 Mar 2020 15:53), Max: 99.4 (30 Mar 2020 15:53)  HR: 91 (30 Mar 2020 15:53) (74 - 91)  BP: 143/97 (30 Mar 2020 15:53) (133/92 - 143/97)  BP(mean): --  RR: 17 (30 Mar 2020 15:53) (16 - 17)  SpO2: 100% (30 Mar 2020 15:53) (100% - 100%)    PHYSICAL EXAM:  Awake Alert Attentive Affect appropriate Ox3  PERRL EOMI  Motor:   Tone: normal.                  Strength:     [X] Upper extremity                      Delt       Bicep    Tricep                                                  R         5/5        5/5        5/5       5/5                                               L          5/5        5/5        5/5       5/5  [X] Lower extremity                       HF          KE          KF        DF         PF                                               R        5/5        5/5        5/5       5/5       5/5                                               L         5/5        5/5       5/5       5/5        5/5  Sensory Intact to Light Touch  Reflexes WNL, No clonus/hoffmans    LABS:                          9.2    5.78  )-----------( 347      ( 30 Mar 2020 06:25 )             29.2     03-30    133<L>  |  103  |  10  ----------------------------<  87  3.7   |  23  |  0.90    Ca    9.7      30 Mar 2020 06:25  Phos  3.1     03-28  Mg     1.7     03-28    TPro  13.6<H>  /  Alb  2.8<L>  /  TBili  < 0.2<L>  /  DBili  x   /  AST  7   /  ALT  < 5  /  AlkPhos  53  03-28          RADIOLOGY & ADDITIONAL STUDIES:    No acute fracture or dislocation is demonstrated. Multifocal lytic lesions throughout the bones, consistent with the patient's history of multiple myeloma.    Advanced right hip osteoarthritis.      2/25/20 CT L/s: IMPRESSION:    1.  Diffuse lytic lesions throughout the pelvis and lower lumbar spine with a permeative appearance of the overlying cortex suspicious for diffuse metastatic disease versus myeloma.  2.  Pathologic compression fracture of the L4 vertebra  3.  Severe right and moderate left hip arthrosis.

## 2020-03-30 NOTE — DIETITIAN INITIAL EVALUATION ADULT. - ADD RECOMMEND
1. Encourage & assist Pt with meals; Monitor PO diet tolerance; Honor food preferences;        2. Monitor labs, hydration status;

## 2020-03-31 ENCOUNTER — RESULT REVIEW (OUTPATIENT)
Age: 60
End: 2020-03-31

## 2020-03-31 LAB
ANION GAP SERPL CALC-SCNC: 8 MMO/L — SIGNIFICANT CHANGE UP (ref 7–14)
B2 MICROGLOB SERPL-MCNC: 6.6 MG/L — HIGH (ref 0.8–2.2)
BUN SERPL-MCNC: 15 MG/DL — SIGNIFICANT CHANGE UP (ref 7–23)
CALCIUM SERPL-MCNC: 10 MG/DL — SIGNIFICANT CHANGE UP (ref 8.4–10.5)
CHLORIDE SERPL-SCNC: 101 MMOL/L — SIGNIFICANT CHANGE UP (ref 98–107)
CO2 SERPL-SCNC: 23 MMOL/L — SIGNIFICANT CHANGE UP (ref 22–31)
CREAT SERPL-MCNC: 0.9 MG/DL — SIGNIFICANT CHANGE UP (ref 0.5–1.3)
GLUCOSE SERPL-MCNC: 84 MG/DL — SIGNIFICANT CHANGE UP (ref 70–99)
HCT VFR BLD CALC: 28.9 % — LOW (ref 39–50)
HGB BLD-MCNC: 9.3 G/DL — LOW (ref 13–17)
IGA FLD-MCNC: 8 MG/DL — LOW (ref 70–400)
IGG FLD-MCNC: 8328 MG/DL — HIGH (ref 700–1600)
IGM SERPL-MCNC: < 5 MG/DL — LOW (ref 40–230)
KAPPA FREE LIGHT CHAINS, SERUM: 90.75 MG/DL — HIGH (ref 0.33–1.94)
LAMBDA FREE LIGHT CHAINS, SERUM: 0.21 MG/DL — LOW (ref 0.57–2.63)
MAGNESIUM SERPL-MCNC: 1.8 MG/DL — SIGNIFICANT CHANGE UP (ref 1.6–2.6)
MCHC RBC-ENTMCNC: 31.1 PG — SIGNIFICANT CHANGE UP (ref 27–34)
MCHC RBC-ENTMCNC: 32.2 % — SIGNIFICANT CHANGE UP (ref 32–36)
MCV RBC AUTO: 96.7 FL — SIGNIFICANT CHANGE UP (ref 80–100)
NRBC # FLD: 0 K/UL — SIGNIFICANT CHANGE UP (ref 0–0)
PHOSPHATE SERPL-MCNC: 4.3 MG/DL — SIGNIFICANT CHANGE UP (ref 2.5–4.5)
PLATELET # BLD AUTO: 393 K/UL — SIGNIFICANT CHANGE UP (ref 150–400)
PMV BLD: 9.1 FL — SIGNIFICANT CHANGE UP (ref 7–13)
POTASSIUM SERPL-MCNC: 3.9 MMOL/L — SIGNIFICANT CHANGE UP (ref 3.5–5.3)
POTASSIUM SERPL-SCNC: 3.9 MMOL/L — SIGNIFICANT CHANGE UP (ref 3.5–5.3)
PROT SERPL-MCNC: 12.6 G/DL — HIGH (ref 6–8.3)
RBC # BLD: 2.99 M/UL — LOW (ref 4.2–5.8)
RBC # FLD: 14.9 % — HIGH (ref 10.3–14.5)
SODIUM SERPL-SCNC: 132 MMOL/L — LOW (ref 135–145)
WBC # BLD: 5.96 K/UL — SIGNIFICANT CHANGE UP (ref 3.8–10.5)
WBC # FLD AUTO: 5.96 K/UL — SIGNIFICANT CHANGE UP (ref 3.8–10.5)

## 2020-03-31 PROCEDURE — 88305 TISSUE EXAM BY PATHOLOGIST: CPT | Mod: 26

## 2020-03-31 PROCEDURE — 88313 SPECIAL STAINS GROUP 2: CPT | Mod: 26

## 2020-03-31 PROCEDURE — 88364 INSITU HYBRIDIZATION (FISH): CPT | Mod: 26

## 2020-03-31 PROCEDURE — 88360 TUMOR IMMUNOHISTOCHEM/MANUAL: CPT | Mod: 26

## 2020-03-31 PROCEDURE — 88341 IMHCHEM/IMCYTCHM EA ADD ANTB: CPT | Mod: 26,59

## 2020-03-31 PROCEDURE — 88367 INSITU HYBRIDIZATION AUTO: CPT | Mod: 26

## 2020-03-31 PROCEDURE — 72157 MRI CHEST SPINE W/O & W/DYE: CPT | Mod: 26

## 2020-03-31 PROCEDURE — 72158 MRI LUMBAR SPINE W/O & W/DYE: CPT | Mod: 26

## 2020-03-31 PROCEDURE — 99223 1ST HOSP IP/OBS HIGH 75: CPT | Mod: GC

## 2020-03-31 PROCEDURE — 88342 IMHCHEM/IMCYTCHM 1ST ANTB: CPT | Mod: 26,59

## 2020-03-31 PROCEDURE — 88189 FLOWCYTOMETRY/READ 16 & >: CPT

## 2020-03-31 PROCEDURE — 84165 PROTEIN E-PHORESIS SERUM: CPT | Mod: 26

## 2020-03-31 PROCEDURE — 72156 MRI NECK SPINE W/O & W/DYE: CPT | Mod: 26

## 2020-03-31 PROCEDURE — 86334 IMMUNOFIX E-PHORESIS SERUM: CPT | Mod: 26

## 2020-03-31 PROCEDURE — 99233 SBSQ HOSP IP/OBS HIGH 50: CPT

## 2020-03-31 PROCEDURE — 88365 INSITU HYBRIDIZATION (FISH): CPT | Mod: 26,59

## 2020-03-31 RX ORDER — HEPARIN SODIUM 5000 [USP'U]/ML
5000 INJECTION INTRAVENOUS; SUBCUTANEOUS ONCE
Refills: 0 | Status: COMPLETED | OUTPATIENT
Start: 2020-03-31 | End: 2020-03-31

## 2020-03-31 RX ORDER — LIDOCAINE HCL 20 MG/ML
20 VIAL (ML) INJECTION ONCE
Refills: 0 | Status: DISCONTINUED | OUTPATIENT
Start: 2020-03-31 | End: 2020-04-09

## 2020-03-31 RX ADMIN — HEPARIN SODIUM 5000 UNIT(S): 5000 INJECTION INTRAVENOUS; SUBCUTANEOUS at 07:00

## 2020-03-31 RX ADMIN — HEPARIN SODIUM 5000 UNIT(S): 5000 INJECTION INTRAVENOUS; SUBCUTANEOUS at 21:30

## 2020-03-31 RX ADMIN — OXYCODONE HYDROCHLORIDE 5 MILLIGRAM(S): 5 TABLET ORAL at 09:30

## 2020-03-31 RX ADMIN — OXYCODONE HYDROCHLORIDE 5 MILLIGRAM(S): 5 TABLET ORAL at 19:23

## 2020-03-31 NOTE — CONSULT NOTE ADULT - ASSESSMENT
60 yo M hx of MM (not in treatment) and HTN who presented for uncontrollable pain, hematology consulted for hx of MM.    #History of Multiple Myeloma  - please obtain records from Dr. Terry's office to better understand past history, treatment, and evaluations  - obtain SPEP, UPEP, serum and urine immunofixation, serum and urine free light chains, B2 microglobulin, LDH  - protein gap noted on CMP  - agree with imaging as recommended by neurosurgery  - rad onc consult    Kateryna Tobias DO  Hematology/Oncology Fellow, PGY5  Pager: [phone] 58 yo M hx of MM (not in treatment) and HTN who presented for uncontrollable pain, hematology consulted for hx of MM.    #History of IgG Kappa Multiple Myeloma  - received 3 cycles RVD in 2018, followed by Pomalyst as patient had a severe skin reaction to Revlimid  - obtain SPEP, UPEP, serum and urine immunofixation, serum and urine free light chains, B2 microglobulin, LDH  - protein gap noted on CMP  - agree with imaging as recommended by neurosurgery  - check hepatitis panel, HIV, skeletal survey  - rad onc consult  - BMBx performed on 3/31, will f/u results and likely start treatment inpatient when results are final.    Kateryna Tobias DO  Hematology/Oncology Fellow, PGY5  Pager: [phone]

## 2020-03-31 NOTE — CONSULT NOTE ADULT - SUBJECTIVE AND OBJECTIVE BOX
HPI:  60 yo M hx of MM (not in treatment) and HTN who presented for uncontrollable pain, hematology consulted for hx of MM.    Per chart, pt presented ED with similar complaints with a CT pelvis 2/25 showing diffuse lytic lesions in the pelvis and lower lumbar spine and an L4 compression fracture, also severe R and moderate L hip arthrosis.  He was discharged home with plan for hematology follow up with his hematologist, Dr. Terry.  Unclear if he followed up.  Describes his pain, he describes months of sharp pain worsening over the last 2-3 weeks and uncontrolled by the advil that he was trying at home.  He denies any fevers, chills, cough. Rates it as an 8/10 pain worse with palpation.  Due to his pain, he has been unable to walk.  Due to this, he called EMS and was brought to [facility] for evaluation.    In the ED, he was afebrile, tachycardic to 102-110, with otherwise unremarkable vital signs.  Diagnostics revealed a Hgb 9.4, protein 13.6, albumin 2.8, CK 91.  He was given morphine admitted for further management.    Records pending from Dr. Terry's office.      PAST MEDICAL & SURGICAL HISTORY:  HTN (hypertension)  Multiple myeloma  No significant past surgical history    MEDICATIONS  (STANDING):  heparin  Injectable 5000 Unit(s) SubCutaneous every 8 hours  influenza   Vaccine 0.5 milliLiter(s) IntraMuscular once  sodium chloride 0.9%. 1000 milliLiter(s) (100 mL/Hr) IV Continuous <Continuous>    MEDICATIONS  (PRN):  acetaminophen   Tablet .. 650 milliGRAM(s) Oral every 4 hours PRN Mild Pain (1 - 3)  morphine  - Injectable 2 milliGRAM(s) IV Push every 2 hours PRN Severe Pain (7 - 10)  oxyCODONE    IR 5 milliGRAM(s) Oral every 4 hours PRN Moderate Pain (4 - 6)      Allergies    No Known Allergies    Intolerances        SOCIAL HISTORY:    FAMILY HISTORY:  No pertinent family history in first degree relatives      Vital Signs Last 24 Hrs  T(C): 36.8 (31 Mar 2020 06:57), Max: 37.4 (30 Mar 2020 15:53)  T(F): 98.2 (31 Mar 2020 06:57), Max: 99.4 (30 Mar 2020 15:53)  HR: 85 (31 Mar 2020 06:57) (85 - 91)  BP: 133/94 (31 Mar 2020 06:57) (133/94 - 144/98)  BP(mean): --  RR: 18 (31 Mar 2020 06:57) (17 - 18)  SpO2: 98% (31 Mar 2020 06:57) (98% - 100%)    PHYSICAL EXAM:    GENERAL: NAD, AAOx3   HEAD:  NC/AT  EYES: EOMI, PERRLA, no scleral icterus  HEENT: Moist mucous membranes  LUNG: Clear to auscultation bilaterally; No rales, rhonchi, wheezing, or rubs  HEART: RRR; No murmurs, rubs, or gallops  ABDOMEN: +BS, ST/ND/NT  EXTREMITIES:  2+ Peripheral Pulses, No clubbing, cyanosis, or edema  LAD: no palpable adenopathy    LABS:                        9.3    5.96  )-----------( 393      ( 31 Mar 2020 05:00 )             28.9     03-31    132<L>  |  101  |  15  ----------------------------<  84  3.9   |  23  |  0.90    Ca    10.0      31 Mar 2020 05:00  Phos  4.3     03-31  Mg     1.8     03-31    TPro  12.6<H>  /  Alb  x   /  TBili  x   /  DBili  x   /  AST  x   /  ALT  x   /  AlkPhos  x   03-31              RADIOLOGY & ADDITIONAL STUDIES: HPI:  58 yo M hx of MM (not in treatment) and HTN who presented for uncontrollable pain, hematology consulted for hx of IgG Kappa MM.    Per chart, pt presented ED with similar complaints with a CT pelvis 2/25 showing diffuse lytic lesions in the pelvis and lower lumbar spine and an L4 compression fracture, also severe R and moderate L hip arthrosis.  He was discharged home with plan for hematology follow up with his hematologist, Dr. Terry.  Unclear if he followed up.  Describes his pain, he describes months of sharp pain worsening over the last 2-3 weeks and uncontrolled by the advil that he was trying at home.  He denies any fevers, chills, cough. Rates it as an 8/10 pain worse with palpation.  Due to his pain, he has been unable to walk.  Due to this, he called EMS and was brought to [facility] for evaluation.    Per records, pt was dx'ed with IgG Kappa Multiple Myeloma in 2018.  He received 3 cycles of RVD, however developed a severe skin reaction to revlimid and was switched to Pomalyst.  He stated he did not continue after 3 cycles as he was told he was in remission and did not feel he had to follow up.    In the ED, he was afebrile, tachycardic to 102-110, with otherwise unremarkable vital signs.  Diagnostics revealed a Hgb 9.4, protein 13.6, albumin 2.8, CK 91.  He was given morphine admitted for further management.      PAST MEDICAL & SURGICAL HISTORY:  HTN (hypertension)  Multiple myeloma  No significant past surgical history    MEDICATIONS  (STANDING):  heparin  Injectable 5000 Unit(s) SubCutaneous every 8 hours  influenza   Vaccine 0.5 milliLiter(s) IntraMuscular once  sodium chloride 0.9%. 1000 milliLiter(s) (100 mL/Hr) IV Continuous <Continuous>    MEDICATIONS  (PRN):  acetaminophen   Tablet .. 650 milliGRAM(s) Oral every 4 hours PRN Mild Pain (1 - 3)  morphine  - Injectable 2 milliGRAM(s) IV Push every 2 hours PRN Severe Pain (7 - 10)  oxyCODONE    IR 5 milliGRAM(s) Oral every 4 hours PRN Moderate Pain (4 - 6)      Allergies    No Known Allergies    Intolerances        SOCIAL HISTORY:    FAMILY HISTORY:  No pertinent family history in first degree relatives      Vital Signs Last 24 Hrs  T(C): 36.8 (31 Mar 2020 06:57), Max: 37.4 (30 Mar 2020 15:53)  T(F): 98.2 (31 Mar 2020 06:57), Max: 99.4 (30 Mar 2020 15:53)  HR: 85 (31 Mar 2020 06:57) (85 - 91)  BP: 133/94 (31 Mar 2020 06:57) (133/94 - 144/98)  BP(mean): --  RR: 18 (31 Mar 2020 06:57) (17 - 18)  SpO2: 98% (31 Mar 2020 06:57) (98% - 100%)    PHYSICAL EXAM:    GENERAL: NAD, AAOx3   HEAD:  NC/AT  EYES: EOMI, PERRLA, no scleral icterus  HEENT: Moist mucous membranes  LUNG: Clear to auscultation bilaterally; No rales, rhonchi, wheezing, or rubs  HEART: RRR; No murmurs, rubs, or gallops  ABDOMEN: +BS, ST/ND/NT  EXTREMITIES:  2+ Peripheral Pulses, No clubbing, cyanosis, or edema  LAD: no palpable adenopathy    LABS:                        9.3    5.96  )-----------( 393      ( 31 Mar 2020 05:00 )             28.9     03-31    132<L>  |  101  |  15  ----------------------------<  84  3.9   |  23  |  0.90    Ca    10.0      31 Mar 2020 05:00  Phos  4.3     03-31  Mg     1.8     03-31    TPro  12.6<H>  /  Alb  x   /  TBili  x   /  DBili  x   /  AST  x   /  ALT  x   /  AlkPhos  x   03-31              RADIOLOGY & ADDITIONAL STUDIES:

## 2020-03-31 NOTE — PROGRESS NOTE ADULT - SUBJECTIVE AND OBJECTIVE BOX
Patient currently at MRI.  Heme note read and appreciated.  Labs pending.  Await results of MRI before calling radiation oncology.    Vital Signs Last 24 Hrs  T(C): 36.8 (31 Mar 2020 06:57), Max: 37.4 (30 Mar 2020 15:53)  T(F): 98.2 (31 Mar 2020 06:57), Max: 99.4 (30 Mar 2020 15:53)  HR: 85 (31 Mar 2020 06:57) (85 - 91)  BP: 133/94 (31 Mar 2020 06:57) (133/94 - 144/98)  BP(mean): --  RR: 18 (31 Mar 2020 06:57) (17 - 18)  SpO2: 98% (31 Mar 2020 06:57) (98% - 100%)  Daily     Daily Weight in k.6 (30 Mar 2020 11:50)  I&O's Summary    Exam: patient at MRI    MEDICATIONS  (STANDING):  heparin  Injectable 5000 Unit(s) SubCutaneous every 8 hours  influenza   Vaccine 0.5 milliLiter(s) IntraMuscular once  sodium chloride 0.9%. 1000 milliLiter(s) (100 mL/Hr) IV Continuous <Continuous>    MEDICATIONS  (PRN):  acetaminophen   Tablet .. 650 milliGRAM(s) Oral every 4 hours PRN Mild Pain (1 - 3)  morphine  - Injectable 2 milliGRAM(s) IV Push every 2 hours PRN Severe Pain (7 - 10)  oxyCODONE    IR 5 milliGRAM(s) Oral every 4 hours PRN Moderate Pain (4 - 6)          132<L>  |  101  |  15  ----------------------------<  84  3.9   |  23  |  0.90    Ca    10.0      31 Mar 2020 05:00  Phos  4.3       Mg     1.8         TPro  12.6<H>  /  Alb  x   /  TBili  x   /  DBili  x   /  AST  x   /  ALT  x   /  AlkPhos  x                             9.3    5.96  )-----------( 393      ( 31 Mar 2020 05:00 )             28.9         HEALTH ISSUES - PROBLEM Dx:  Multiple myeloma: Multiple myeloma  Need for prophylactic measure: Need for prophylactic measure  Essential hypertension: Essential hypertension  Multiple myeloma not having achieved remission: Multiple myeloma not having achieved remission  Failure to thrive in adult: Failure to thrive in adult  Pain: Pain Patient currently at MRI.  Heme note read and appreciated.  Labs pending.  Await results of MRI before calling radiation oncology.    Vital Signs Last 24 Hrs  T(C): 36.8 (31 Mar 2020 06:57), Max: 37.4 (30 Mar 2020 15:53)  T(F): 98.2 (31 Mar 2020 06:57), Max: 99.4 (30 Mar 2020 15:53)  HR: 85 (31 Mar 2020 06:57) (85 - 91)  BP: 133/94 (31 Mar 2020 06:57) (133/94 - 144/98)  BP(mean): --  RR: 18 (31 Mar 2020 06:57) (17 - 18)  SpO2: 98% (31 Mar 2020 06:57) (98% - 100%)  Daily     Daily Weight in k.6 (30 Mar 2020 11:50)  I&O's Summary    Exam: patient at MRI    MEDICATIONS  (STANDING):  heparin  Injectable 5000 Unit(s) SubCutaneous every 8 hours  influenza   Vaccine 0.5 milliLiter(s) IntraMuscular once  sodium chloride 0.9%. 1000 milliLiter(s) (100 mL/Hr) IV Continuous <Continuous>    MEDICATIONS  (PRN):  acetaminophen   Tablet .. 650 milliGRAM(s) Oral every 4 hours PRN Mild Pain (1 - 3)  morphine  - Injectable 2 milliGRAM(s) IV Push every 2 hours PRN Severe Pain (7 - 10)  oxyCODONE    IR 5 milliGRAM(s) Oral every 4 hours PRN Moderate Pain (4 - 6)          132<L>  |  101  |  15  ----------------------------<  84  3.9   |  23  |  0.90    Ca    10.0      31 Mar 2020 05:00  Phos  4.3     -  Mg     1.8         TPro  12.6<H>  /  Alb  x   /  TBili  x   /  DBili  x   /  AST  x   /  ALT  x   /  AlkPhos  x                             9.3    5.96  )-----------( 393      ( 31 Mar 2020 05:00 )             28.9         HEALTH ISSUES - PROBLEM Dx:  Multiple myeloma: Pain likely secondary to MM. Heme consult requested (and consider radiation oncology)  Essential hypertension: reasonably well controlled

## 2020-03-31 NOTE — PROCEDURE NOTE - SUPERVISORY STATEMENT
I personally obtained consent from the patient and described procedures and possible therapy for suspected diagnosis of multiple myeloma

## 2020-03-31 NOTE — CONSULT NOTE ADULT - ATTENDING COMMENTS
TRISTON Robert is a 59 year old male with a prior history of multiple myeloma diagnosed in 2018. He discontinued therapy after two months of Revlimid therapy because of a skin rash. He now has significant bone lesions in his thoracici and lumbar spine and an IgG kappa chain measuring 8 grams/dL. Free light chin analysis shows a kappa to lambda ration of 90.  I have taken a history and he had a history of parasite treatment in [place] as a child. He does not know the name of the parasite and he has no symptoms of parasitic disease at present. Stool testing for ova and parasite should be requested.  Discussion of bone marrow biopsy and aspiration to establish a histological diagnosis.   Staging by [facility] revised system to include serum LDH and beta 2 microglobulin.  I have had a discussion of the risks and side effects of chemotherapy treatment

## 2020-04-01 DIAGNOSIS — Z71.89 OTHER SPECIFIED COUNSELING: ICD-10-CM

## 2020-04-01 LAB
ANION GAP SERPL CALC-SCNC: 11 MMO/L — SIGNIFICANT CHANGE UP (ref 7–14)
BUN SERPL-MCNC: 15 MG/DL — SIGNIFICANT CHANGE UP (ref 7–23)
CALCIUM SERPL-MCNC: 10.3 MG/DL — SIGNIFICANT CHANGE UP (ref 8.4–10.5)
CHLORIDE SERPL-SCNC: 100 MMOL/L — SIGNIFICANT CHANGE UP (ref 98–107)
CO2 SERPL-SCNC: 21 MMOL/L — LOW (ref 22–31)
CREAT SERPL-MCNC: 0.86 MG/DL — SIGNIFICANT CHANGE UP (ref 0.5–1.3)
GLUCOSE SERPL-MCNC: 97 MG/DL — SIGNIFICANT CHANGE UP (ref 70–99)
HAV IGM SER-ACNC: NONREACTIVE — SIGNIFICANT CHANGE UP
HBV CORE AB SER-ACNC: NONREACTIVE — SIGNIFICANT CHANGE UP
HBV CORE IGM SER-ACNC: NONREACTIVE — SIGNIFICANT CHANGE UP
HBV SURFACE AB SER-ACNC: NONREACTIVE — SIGNIFICANT CHANGE UP
HBV SURFACE AG SER-ACNC: NONREACTIVE — SIGNIFICANT CHANGE UP
HCT VFR BLD CALC: 28.2 % — LOW (ref 39–50)
HCV AB S/CO SERPL IA: 0.03 S/CO — SIGNIFICANT CHANGE UP (ref 0–0.99)
HCV AB SERPL-IMP: SIGNIFICANT CHANGE UP
HGB BLD-MCNC: 9 G/DL — LOW (ref 13–17)
KAPPA/LAMBDA FREE LIGHT CHAIN RATIO, SERUM: 432.14 — SIGNIFICANT CHANGE UP
LDH SERPL L TO P-CCNC: 140 U/L — SIGNIFICANT CHANGE UP (ref 135–225)
MAGNESIUM SERPL-MCNC: 1.7 MG/DL — SIGNIFICANT CHANGE UP (ref 1.6–2.6)
MCHC RBC-ENTMCNC: 30.8 PG — SIGNIFICANT CHANGE UP (ref 27–34)
MCHC RBC-ENTMCNC: 31.9 % — LOW (ref 32–36)
MCV RBC AUTO: 96.6 FL — SIGNIFICANT CHANGE UP (ref 80–100)
NRBC # FLD: 0.02 K/UL — SIGNIFICANT CHANGE UP (ref 0–0)
PHOSPHATE SERPL-MCNC: 4 MG/DL — SIGNIFICANT CHANGE UP (ref 2.5–4.5)
PLATELET # BLD AUTO: 351 K/UL — SIGNIFICANT CHANGE UP (ref 150–400)
PMV BLD: 8.8 FL — SIGNIFICANT CHANGE UP (ref 7–13)
POTASSIUM SERPL-MCNC: 3.8 MMOL/L — SIGNIFICANT CHANGE UP (ref 3.5–5.3)
POTASSIUM SERPL-SCNC: 3.8 MMOL/L — SIGNIFICANT CHANGE UP (ref 3.5–5.3)
PROT SERPL-MCNC: 12.3 G/DL — HIGH (ref 6–8.3)
PROT UR-MCNC: 19.2 MG/DL — SIGNIFICANT CHANGE UP
RBC # BLD: 2.92 M/UL — LOW (ref 4.2–5.8)
RBC # FLD: 15.1 % — HIGH (ref 10.3–14.5)
SODIUM SERPL-SCNC: 132 MMOL/L — LOW (ref 135–145)
WBC # BLD: 5.11 K/UL — SIGNIFICANT CHANGE UP (ref 3.8–10.5)
WBC # FLD AUTO: 5.11 K/UL — SIGNIFICANT CHANGE UP (ref 3.8–10.5)

## 2020-04-01 PROCEDURE — 84166 PROTEIN E-PHORESIS/URINE/CSF: CPT | Mod: 26

## 2020-04-01 PROCEDURE — 99233 SBSQ HOSP IP/OBS HIGH 50: CPT | Mod: GC

## 2020-04-01 PROCEDURE — 99233 SBSQ HOSP IP/OBS HIGH 50: CPT

## 2020-04-01 RX ORDER — MAGNESIUM HYDROXIDE 400 MG/1
30 TABLET, CHEWABLE ORAL DAILY
Refills: 0 | Status: DISCONTINUED | OUTPATIENT
Start: 2020-04-01 | End: 2020-04-09

## 2020-04-01 RX ORDER — ACYCLOVIR SODIUM 500 MG
400 VIAL (EA) INTRAVENOUS DAILY
Refills: 0 | Status: DISCONTINUED | OUTPATIENT
Start: 2020-04-01 | End: 2020-04-09

## 2020-04-01 RX ORDER — SENNA PLUS 8.6 MG/1
2 TABLET ORAL AT BEDTIME
Refills: 0 | Status: DISCONTINUED | OUTPATIENT
Start: 2020-04-01 | End: 2020-04-09

## 2020-04-01 RX ORDER — DEXAMETHASONE 0.5 MG/5ML
40 ELIXIR ORAL ONCE
Refills: 0 | Status: COMPLETED | OUTPATIENT
Start: 2020-04-01 | End: 2020-04-01

## 2020-04-01 RX ORDER — BORTEZOMIB 2.5 MG/1
2.3 INJECTION INTRAVENOUS ONCE
Refills: 0 | Status: COMPLETED | OUTPATIENT
Start: 2020-04-01 | End: 2020-04-01

## 2020-04-01 RX ADMIN — HEPARIN SODIUM 5000 UNIT(S): 5000 INJECTION INTRAVENOUS; SUBCUTANEOUS at 05:07

## 2020-04-01 RX ADMIN — HEPARIN SODIUM 5000 UNIT(S): 5000 INJECTION INTRAVENOUS; SUBCUTANEOUS at 13:31

## 2020-04-01 RX ADMIN — Medication 400 MILLIGRAM(S): at 13:31

## 2020-04-01 RX ADMIN — BORTEZOMIB 2.3 MILLIGRAM(S): 2.5 INJECTION INTRAVENOUS at 13:54

## 2020-04-01 RX ADMIN — OXYCODONE HYDROCHLORIDE 5 MILLIGRAM(S): 5 TABLET ORAL at 21:49

## 2020-04-01 RX ADMIN — Medication 40 MILLIGRAM(S): at 13:32

## 2020-04-01 RX ADMIN — HEPARIN SODIUM 5000 UNIT(S): 5000 INJECTION INTRAVENOUS; SUBCUTANEOUS at 21:50

## 2020-04-01 RX ADMIN — OXYCODONE HYDROCHLORIDE 5 MILLIGRAM(S): 5 TABLET ORAL at 17:35

## 2020-04-01 NOTE — CHART NOTE - NSCHARTNOTEFT_GEN_A_CORE
MR reviewed. Significant T1 compression fracture and multiple other compression fractures likely 2/2 multiple myeloma. Discussed with patient operative route would  be significant. Given asymptomatic from his t1 compression fracture and patient's preference, defer to radiation oncology and heme onc and conservative management.

## 2020-04-01 NOTE — PROGRESS NOTE ADULT - SUBJECTIVE AND OBJECTIVE BOX
Pain persists.  Constipation (secondary to pain meds probably)  BM Bx yesterday-plan to be defined by Dr. Carney      Vital Signs Last 24 Hrs  T(C): 36.9 (01 Apr 2020 05:05), Max: 36.9 (31 Mar 2020 14:18)  T(F): 98.5 (01 Apr 2020 05:05), Max: 98.5 (01 Apr 2020 05:05)  HR: 86 (01 Apr 2020 05:05) (86 - 97)  BP: 120/86 (01 Apr 2020 05:05) (120/86 - 131/89)  BP(mean): --  RR: 20 (01 Apr 2020 05:05) (18 - 20)  SpO2: 98% (01 Apr 2020 05:05) (98% - 100%)  Daily     Daily   I&O's Summary      Neck: no bruits, JVD, adenopathy  Chest: clear  Cor: [id number], RR, normal S1S2 with no mrght  Abd: soft, nontender, BS+ and no HSM  Ext: w/o CCE  Pulses:2+->dp bilaterally    MEDICATIONS  (STANDING):  acyclovir   Oral Tab/Cap 400 milliGRAM(s) Oral daily  heparin  Injectable 5000 Unit(s) IV Push once  heparin  Injectable 5000 Unit(s) SubCutaneous every 8 hours  influenza   Vaccine 0.5 milliLiter(s) IntraMuscular once  lidocaine 2% (Preservative-free) Injectable 20 milliLiter(s) Local Injection once  sodium chloride 0.9%. 1000 milliLiter(s) (100 mL/Hr) IV Continuous <Continuous>    MEDICATIONS  (PRN):  acetaminophen   Tablet .. 650 milliGRAM(s) Oral every 4 hours PRN Mild Pain (1 - 3)  morphine  - Injectable 2 milliGRAM(s) IV Push every 2 hours PRN Severe Pain (7 - 10)  oxyCODONE    IR 5 milliGRAM(s) Oral every 4 hours PRN Moderate Pain (4 - 6)      04-01    132<L>  |  100  |  15  ----------------------------<  97  3.8   |  21<L>  |  0.86    Ca    10.3      01 Apr 2020 03:16  Phos  4.0     04-01  Mg     1.7     04-01    TPro  12.3<H>  /  Alb  x   /  TBili  x   /  DBili  x   /  AST  x   /  ALT  x   /  AlkPhos  x   04-01                          9.0    5.11  )-----------( 351      ( 01 Apr 2020 03:51 )             28.2         HEALTH ISSUES - PROBLEM Dx:  Multiple myeloma: as per Dr. Carney  Constipation: Senna and MOM today  Unsteadiness (secondary to pain): physical therapy

## 2020-04-01 NOTE — PROGRESS NOTE ADULT - SUBJECTIVE AND OBJECTIVE BOX
s/p BMBx yesterday.  Afebrile overnight.  As FLC ratio increased confirming Multiple Myeloma, will plan on starting Velcade/Dexamethasone today.    General: denies fevers, chills  Skin/Breast: denies rash   Ophthalmologic: denies blurry vision  ENMT: denies throat pain  Respiratory and Thorax: denies cough, denies shortness of breath  Cardiovascular: denies chest pain, palpitations. Denies LE swelling   Gastrointestinal: denies abdominal pain/ nausea/ vomiting/ diarrhea. Denies BRBPR/ melena   Genitourinary: Denies dysuria  Musculoskeletal: Denies mylagias   Neurological: Denies syncope  Psychiatric: Denies mood disturbance   Hematology/Lymphatics: denies bleeding/bruising. Denies skin lumps 	    Vital Signs Last 24 Hrs  T(C): 36.9 (01 Apr 2020 05:05), Max: 36.9 (31 Mar 2020 14:18)  T(F): 98.5 (01 Apr 2020 05:05), Max: 98.5 (01 Apr 2020 05:05)  HR: 86 (01 Apr 2020 05:05) (86 - 97)  BP: 120/86 (01 Apr 2020 05:05) (120/86 - 131/89)  BP(mean): --  RR: 20 (01 Apr 2020 05:05) (18 - 20)  SpO2: 98% (01 Apr 2020 05:05) (98% - 100%)  PHYSICAL EXAM:    GENERAL: NAD, AAOx3  HEAD:  NC/AT  EYES: EOMI, PERRLA, no scleral icterus  HEENT: Moist mucous membranes  LUNG: Clear to auscultation bilaterally; No rales, rhonchi, wheezing, or rubs  HEART: RRR; No murmurs, rubs, or gallops  ABDOMEN: +BS, ST/ND/NT  EXTREMITIES:  TTP R hip  LAD: no palpable adenopathy  04-01    132<L>  |  100  |  15  ----------------------------<  97  3.8   |  21<L>  |  0.86    Ca    10.3      01 Apr 2020 03:16  Phos  4.0     04-01  Mg     1.7     04-01    TPro  12.3<H>  /  Alb  x   /  TBili  x   /  DBili  x   /  AST  x   /  ALT  x   /  AlkPhos  x   04-01      CBC Full  -  ( 01 Apr 2020 03:51 )  WBC Count : 5.11 K/uL  RBC Count : 2.92 M/uL  Hemoglobin : 9.0 g/dL  Hematocrit : 28.2 %  Platelet Count - Automated : 351 K/uL  Mean Cell Volume : 96.6 fL  Mean Cell Hemoglobin : 30.8 pg  Mean Cell Hemoglobin Concentration : 31.9 %  Auto Neutrophil # : x  Auto Lymphocyte # : x  Auto Monocyte # : x  Auto Eosinophil # : x  Auto Basophil # : x  Auto Neutrophil % : x  Auto Lymphocyte % : x  Auto Monocyte % : x  Auto Eosinophil % : x  Auto Basophil % : x      LIVER FUNCTIONS - ( 01 Apr 2020 03:16 )  Alb: x     / Pro: 12.3 g/dL / ALK PHOS: x     / ALT: x     / AST: x     / GGT: x

## 2020-04-01 NOTE — CHART NOTE - NSCHARTNOTEFT_GEN_A_CORE
HPI:  Rolf Robert is a 59 year old man with a history of multiple myeloma (not in treatment) and HTN who presents for uncontrolled pain with difficulty ambulating.      He was recently in the ED with similar complaints with a CT pelvis 2/25 showing diffuse lytic lesions in the pelvis and lower lumbar spine and an L4 compression fracture, also severe R and moderate L hip arthrosis.  He was discharged home with plan for oncology follow up.    Regarding his pain, he describes months of sharp pain worsening over the last 2-3 weeks and uncontrolled by the advil that he was trying at home.  He denies any fevers, chills, cough. Rates it as an 8/10 pain worse with palpation.     see imaging below.   The L4 compression fracture is stable compared to prior CT imaging per imaging results.   A T2 compression fracture is noted.     < from: MR Thoracic Spine w/wo IV Cont (03.31.20 @ 12:06) >    IMPRESSION:    Widespread myelomatous changes are noted throughout the entire spine. No large areas of epidural tumor extension are noted.     Multiple pathologic compression fractures are noted with distribution as described, most pronounced at the T2 level. A completely collapsed T2 vertebral body (pathologic vertebral plana deformity) is present with mild retropulsion of bone into the spinal canal. Moderate central canal stenosis is present secondary to the collapsed vertebral body with indentation of the ventral cord. No associated spinal cord edema is present.    < from: MR Lumbar Spine w/wo IV Cont (03.31.20 @ 12:06) >    IMPRESSION:    Widespread myelomatous changes are noted throughout the entire spine. No large areas of epidural tumor extension are noted.     Multiple pathologic compression fractures are noted with distribution as described, most pronounced at the T2 level. A completely collapsed T2 vertebral body (pathologic vertebral plana deformity) is present with mild retropulsion of bone into the spinal canal. Moderate central canal stenosis is present secondary to the collapsed vertebral body with indentation     A/P :   59 year old male with a h/o myeloma and numerous compression fractures seen on imaging with a T2 collapsed vertebrae.   Given that imaging shows a T2 compression fracture with mild retropulsion of bone into the spinal canal we defer to neurosurgery.   Radiation  will not fix a retropulsed bone.  We will continue to follow.     [phone].   Thank you. HPI:  Rolf Robert is a 59 year old man with a history of multiple myeloma (not in treatment) and HTN who presents for uncontrolled pain with difficulty ambulating.      He was recently in the ED with similar complaints with a CT pelvis 2/25 showing diffuse lytic lesions in the pelvis and lower lumbar spine and an L4 compression fracture known since at least February, also severe R and moderate L hip arthrosis.  He was discharged home with plan for oncology follow up.    Regarding his pain, he describes months of sharp pain worsening over the last 2-3 weeks and uncontrolled by the advil that he was trying at home.  He denies any fevers, chills, cough. Rates it as an 8/10 pain worse with palpation.     see imaging below.   The L4 compression fracture is stable compared to prior CT imaging per imaging results.   A T2 compression fracture is noted.     < from: MR Thoracic Spine w/wo IV Cont (03.31.20 @ 12:06) >    IMPRESSION:    Widespread myelomatous changes are noted throughout the entire spine. No large areas of epidural tumor extension are noted.     Multiple pathologic compression fractures are noted with distribution as described, most pronounced at the T2 level. A completely collapsed T2 vertebral body (pathologic vertebral plana deformity) is present with mild retropulsion of bone into the spinal canal. Moderate central canal stenosis is present secondary to the collapsed vertebral body with indentation of the ventral cord. No associated spinal cord edema is present.    < from: MR Lumbar Spine w/wo IV Cont (03.31.20 @ 12:06) >    IMPRESSION:    Widespread myelomatous changes are noted throughout the entire spine. No large areas of epidural tumor extension are noted.     Multiple pathologic compression fractures are noted with distribution as described, most pronounced at the T2 level. A completely collapsed T2 vertebral body (pathologic vertebral plana deformity) is present with mild retropulsion of bone into the spinal canal. Moderate central canal stenosis is present secondary to the collapsed vertebral body with indentation     A/P :   59 year old male with a h/o myeloma and numerous compression fractures seen on imaging with a T2 collapsed vertebrae.   Given that imaging shows a T2 compression fracture with mild retropulsion of bone into the spinal canal we defer to neurosurgery.   Radiation  will not fix a retropulsed bone.  We will continue to follow.     [phone].   Thank you. HPI:  Rolf Robert is a 59 year old man with a history of multiple myeloma (not in treatment) and HTN who presents for uncontrolled pain with difficulty ambulating.      He was recently in the ED with similar complaints with a CT pelvis 2/25 showing diffuse lytic lesions in the pelvis and lower lumbar spine and an L4 compression fracture known since at least February, also severe R and moderate L hip arthrosis.  He was discharged home with plan for oncology follow up.    Regarding his pain, he describes months of sharp pain worsening over the last 2-3 weeks and uncontrolled by the advil that he was trying at home.  He denies any fevers, chills, cough. Rates it as an 8/10 pain worse with palpation.     see imaging below.   The L4 compression fracture is stable compared to prior CT imaging per imaging results.   A T2 compression fracture is noted.     < from: MR Thoracic Spine w/wo IV Cont (03.31.20 @ 12:06) >    IMPRESSION:    Widespread myelomatous changes are noted throughout the entire spine. No large areas of epidural tumor extension are noted.     Multiple pathologic compression fractures are noted with distribution as described, most pronounced at the T2 level. A completely collapsed T2 vertebral body (pathologic vertebral plana deformity) is present with mild retropulsion of bone into the spinal canal. Moderate central canal stenosis is present secondary to the collapsed vertebral body with indentation of the ventral cord. No associated spinal cord edema is present.    < from: MR Lumbar Spine w/wo IV Cont (03.31.20 @ 12:06) >    IMPRESSION:    Widespread myelomatous changes are noted throughout the entire spine. No large areas of epidural tumor extension are noted.     Multiple pathologic compression fractures are noted with distribution as described, most pronounced at the T2 level. A completely collapsed T2 vertebral body (pathologic vertebral plana deformity) is present with mild retropulsion of bone into the spinal canal. Moderate central canal stenosis is present secondary to the collapsed vertebral body with indentation     A/P : Case discussed with Dr. Villalpando.    59 year old male with a h/o myeloma and numerous compression fractures seen on imaging with a T2 collapsed vertebrae.   Given that imaging shows a T2 compression fracture with mild retropulsion of bone into the spinal canal we defer to neurosurgery.   Radiation  will not fix a retropulsed bone.  Other options include single fraction inpatient radiation but pain management will be the optimal goal since radiation will not provide a rapid response.  Pain management followed by possible discharge and outpatient radiation is another option if surgical intervention cannot be done.   We will continue to follow.     [phone].   Thank you.

## 2020-04-01 NOTE — PROGRESS NOTE ADULT - ATTENDING COMMENTS
TRISTON Robert is a 59 year old male with a diagnosis of multiple myeloma who relapsed after a non completed (patient had refused further treatment) course of dexamethasone Revlimid and bortezomib in 2018.  He has signs of recurrence off therapy with IgG kappa expression: kappa/lambda 90/02 or approximately 400; total IgG 8.8 grams/dL.  His total serum calcium is normal as is his renal function. I spent over 30 minutes with him today in discussion of our recommendation to begin chemotherapy with dexamethasone and bortezomib; side effects of treatment were discussed. he has severe back pain which is limiting his ability to stand up. Radiation medicine note reviewed and R T is currently not planned.  Discussion of twice weekly bortezomib followed by a two week holiday form treatment; cycles to be repeated for at least 6 months with consideration of Imid oral therapy as an outpatient

## 2020-04-02 ENCOUNTER — TRANSCRIPTION ENCOUNTER (OUTPATIENT)
Age: 60
End: 2020-04-02

## 2020-04-02 LAB
ANION GAP SERPL CALC-SCNC: 8 MMO/L — SIGNIFICANT CHANGE UP (ref 7–14)
BUN SERPL-MCNC: 25 MG/DL — HIGH (ref 7–23)
CALCIUM SERPL-MCNC: 10.3 MG/DL — SIGNIFICANT CHANGE UP (ref 8.4–10.5)
CHLORIDE SERPL-SCNC: 100 MMOL/L — SIGNIFICANT CHANGE UP (ref 98–107)
CO2 SERPL-SCNC: 21 MMOL/L — LOW (ref 22–31)
CREAT SERPL-MCNC: 0.89 MG/DL — SIGNIFICANT CHANGE UP (ref 0.5–1.3)
GAS PNL BLDMV: SIGNIFICANT CHANGE UP
GLUCOSE SERPL-MCNC: 132 MG/DL — HIGH (ref 70–99)
HCT VFR BLD CALC: 28.4 % — LOW (ref 39–50)
HEMATOPATHOLOGY REPORT: SIGNIFICANT CHANGE UP
HGB BLD-MCNC: 9.3 G/DL — LOW (ref 13–17)
HIV 1+2 AB+HIV1 P24 AG SERPL QL IA: SIGNIFICANT CHANGE UP
MAGNESIUM SERPL-MCNC: 1.7 MG/DL — SIGNIFICANT CHANGE UP (ref 1.6–2.6)
MCHC RBC-ENTMCNC: 31.1 PG — SIGNIFICANT CHANGE UP (ref 27–34)
MCHC RBC-ENTMCNC: 32.7 % — SIGNIFICANT CHANGE UP (ref 32–36)
MCV RBC AUTO: 95 FL — SIGNIFICANT CHANGE UP (ref 80–100)
NRBC # FLD: 0 K/UL — SIGNIFICANT CHANGE UP (ref 0–0)
PHOSPHATE SERPL-MCNC: 3.5 MG/DL — SIGNIFICANT CHANGE UP (ref 2.5–4.5)
PLATELET # BLD AUTO: 369 K/UL — SIGNIFICANT CHANGE UP (ref 150–400)
PMV BLD: 9.1 FL — SIGNIFICANT CHANGE UP (ref 7–13)
POTASSIUM SERPL-MCNC: 4.4 MMOL/L — SIGNIFICANT CHANGE UP (ref 3.5–5.3)
POTASSIUM SERPL-SCNC: 4.4 MMOL/L — SIGNIFICANT CHANGE UP (ref 3.5–5.3)
RBC # BLD: 2.99 M/UL — LOW (ref 4.2–5.8)
RBC # FLD: 14.8 % — HIGH (ref 10.3–14.5)
SODIUM SERPL-SCNC: 129 MMOL/L — LOW (ref 135–145)
TM INTERPRETATION: SIGNIFICANT CHANGE UP
WBC # BLD: 8.56 K/UL — SIGNIFICANT CHANGE UP (ref 3.8–10.5)
WBC # FLD AUTO: 8.56 K/UL — SIGNIFICANT CHANGE UP (ref 3.8–10.5)

## 2020-04-02 PROCEDURE — 84165 PROTEIN E-PHORESIS SERUM: CPT | Mod: 26

## 2020-04-02 PROCEDURE — 99233 SBSQ HOSP IP/OBS HIGH 50: CPT

## 2020-04-02 PROCEDURE — 77075 RADEX OSSEOUS SURVEY COMPL: CPT | Mod: 26

## 2020-04-02 PROCEDURE — 99233 SBSQ HOSP IP/OBS HIGH 50: CPT | Mod: GC

## 2020-04-02 RX ORDER — OXYCODONE HYDROCHLORIDE 5 MG/1
5 TABLET ORAL EVERY 4 HOURS
Refills: 0 | Status: DISCONTINUED | OUTPATIENT
Start: 2020-04-02 | End: 2020-04-09

## 2020-04-02 RX ORDER — MORPHINE SULFATE 50 MG/1
2 CAPSULE, EXTENDED RELEASE ORAL
Refills: 0 | Status: DISCONTINUED | OUTPATIENT
Start: 2020-04-02 | End: 2020-04-02

## 2020-04-02 RX ORDER — PAMIDRONATE DISODIUM 9 MG/ML
60 INJECTION, SOLUTION INTRAVENOUS ONCE
Refills: 0 | Status: COMPLETED | OUTPATIENT
Start: 2020-04-02 | End: 2020-04-02

## 2020-04-02 RX ADMIN — OXYCODONE HYDROCHLORIDE 5 MILLIGRAM(S): 5 TABLET ORAL at 13:19

## 2020-04-02 RX ADMIN — OXYCODONE HYDROCHLORIDE 5 MILLIGRAM(S): 5 TABLET ORAL at 22:29

## 2020-04-02 RX ADMIN — HEPARIN SODIUM 5000 UNIT(S): 5000 INJECTION INTRAVENOUS; SUBCUTANEOUS at 06:20

## 2020-04-02 RX ADMIN — HEPARIN SODIUM 5000 UNIT(S): 5000 INJECTION INTRAVENOUS; SUBCUTANEOUS at 13:15

## 2020-04-02 RX ADMIN — PAMIDRONATE DISODIUM 64.17 MILLIGRAM(S): 9 INJECTION, SOLUTION INTRAVENOUS at 14:50

## 2020-04-02 RX ADMIN — Medication 400 MILLIGRAM(S): at 13:15

## 2020-04-02 NOTE — DISCHARGE NOTE PROVIDER - CARE PROVIDER_API CALL
Radiation Oncology,   Phone: (   )    -  Fax: (   )    -  Follow Up Time: 1 week    Radiation Oncology [facility],   [phone]  Phone: (   )    -  Fax: (   )    -  Follow Up Time: 1 week    Sherie Lynch)  [facility] Neurosurgery  General  [address] [place] [address], [address]  [place] [address]  Phone: [phone]  Fax: [phone]  Follow Up Time: Routine Sherie Lynch)  [facility] Neurosurgery  General  [address], [address]  [place] [address]  Phone: [phone]  Fax: [phone]  Follow Up Time: Routine    Radiation Oncology,   Phone: (   )    -  Fax: (   )    -  Follow Up Time: 1 week    Radiation Oncology [facility],   [phone]  Phone: (   )    -  Fax: (   )    -  Follow Up Time: 1 week    Willie Roper  INTERNAL MEDICINE  Phone: (   )    -  Fax: (   )    -  Established Patient  Follow Up Time:

## 2020-04-02 NOTE — DISCHARGE NOTE PROVIDER - NSDCFUADDAPPT_GEN_ALL_CORE_FT
You will follow-up with hematology-oncology at [facility] to continue treatment for the multiple myeloma. You will follow-up with hematology-oncology at [facility] to continue treatment for the multiple myeloma. They will contact you.  Please follow-up with primary care provider in 1 week to check your phosphorus level, take phosphorus supplement as prescribed. You will follow-up with hematology-oncology at [facility] to continue treatment for the multiple myeloma. They will contact you for a telephone encounter on Monday 4/13/2020.    Please follow-up with primary care provider in 1 week to check your phosphorus level, take phosphorus supplement as prescribed.

## 2020-04-02 NOTE — DISCHARGE NOTE PROVIDER - PROVIDER TOKENS
FREE:[LAST:[Radiation Oncology],PHONE:[(   )    -],FAX:[(   )    -],FOLLOWUP:[1 week]],FREE:[LAST:[Radiation Oncology [facility] outpatient],PHONE:[(   )    -],FAX:[(   )    -],ADDRESS:[[phone]],FOLLOWUP:[1 week]],PROVIDER:[TOKEN:[[id number]],FOLLOWUP:[Routine]] PROVIDER:[TOKEN:[[id number]],FOLLOWUP:[Routine]],FREE:[LAST:[Radiation Oncology],PHONE:[(   )    -],FAX:[(   )    -],FOLLOWUP:[1 week]],FREE:[LAST:[Radiation Oncology [facility]],PHONE:[(   )    -],FAX:[(   )    -],ADDRESS:[[phone]],FOLLOWUP:[1 week]],FREE:[LAST:[[place]],FIRST:[Willie],PHONE:[(   )    -],FAX:[(   )    -],ADDRESS:[INTERNAL MEDICINE],ESTABLISHEDPATIENT:[T]]

## 2020-04-02 NOTE — DISCHARGE NOTE PROVIDER - CARE PROVIDERS DIRECT ADDRESSES
,DirectAddress_Unknown,DirectAddress_Unknown,[email or web address],DirectAddress_Unknown,DirectAddress_Unknown,DirectAddress_Unknown

## 2020-04-02 NOTE — DISCHARGE NOTE PROVIDER - NSDCFUADDINST_GEN_ALL_CORE_FT
Take over-the-counter senna and/or Miralax to prevent constipation while taking the opioid medications.  Can take Tylenol instead of oxycodone if pain not too severe.

## 2020-04-02 NOTE — DISCHARGE NOTE PROVIDER - NSDCMRMEDTOKEN_GEN_ALL_CORE_FT
Advil 200 mg oral tablet: 1 tab(s) orally every 6 hours, As Needed Advil 200 mg oral tablet: 1 tab(s) orally every 6 hours, As Needed  rolling walker: rolling walker  Dx: debility multiple myeloma  ICD 10: R54 + C90  MEG: 99 months Daily Multiple Vitamins oral tablet: 1 tab(s) orally once a day  rolling walker: rolling walker  Dx: debility multiple myeloma  ICD 10: R54 + C90  MEG: 99 months  Zovirax 400 mg oral tablet: 1 tab(s) orally once a day Daily Multiple Vitamins oral tablet: 1 tab(s) orally once a day  K-Phos No. 2 oral tablet: 1 tab(s) orally 2 times a day before meals,  dispense generic if needed  rolling walker: rolling walker  Dx: debility multiple myeloma  ICD 10: R54 + C90  MEG: 99 months  Zovirax 400 mg oral tablet: 1 tab(s) orally once a day Daily Multiple Vitamins oral tablet: 1 tab(s) orally once a day  K-Phos No. 2 oral tablet: 1 tab(s) orally 4 times a day  before meals, dispense generic if needed   Marinol 2.5 mg oral capsule: 1 cap(s) orally once a day MDD:2.5 mg  Oxaydo 5 mg oral tablet: 1 tab(s) orally every 4 hours, As needed, Moderate Pain (4 - 6) MDD:30 mg  rolling walker: rolling walker  Dx: debility multiple myeloma  ICD 10: R54 + C90  MEG: 99 months  Zovirax 400 mg oral tablet: 1 tab(s) orally once a day Daily Multiple Vitamins oral tablet: 1 tab(s) orally once a day  K-Phos No. 2 oral tablet: 1 tab(s) orally 4 times a day  before meals, dispense generic if needed   rolling walker: rolling walker  Dx: debility multiple myeloma  ICD 10: R54 + C90  MEG: 99 months  Zovirax 400 mg oral tablet: 1 tab(s) orally once a day Daily Multiple Vitamins oral tablet: 1 tab(s) orally once a day  Marinol 2.5 mg oral capsule: 1 cap(s) orally once a day MDD:2.5 mg  Oxaydo 5 mg oral tablet: 1 tab(s) orally every 4 hours, As needed, Moderate Pain (4 - 6) MDD:30 mg  Phospha 250 Neutral oral tablet: 1 tab(s) orally 4 times a day,  need to check lab for phosphorus in 1 week  rolling walker: rolling walker  Dx: debility multiple myeloma  ICD 10: R54 + C90  MEG: 99 months  Zovirax 400 mg oral tablet: 1 tab(s) orally once a day Daily Multiple Vitamins oral tablet: 1 tab(s) orally once a day  Oxaydo 5 mg oral tablet: 1 tab(s) orally every 4 hours, As needed, Moderate Pain (4 - 6) MDD:30 mg  Phospha 250 Neutral oral tablet: 1 tab(s) orally 4 times a day,  need to check lab for phosphorus in 1 week  rolling walker: rolling walker  Dx: debility multiple myeloma  ICD 10: R54 + C90  MEG: 99 months  Zovirax 400 mg oral tablet: 1 tab(s) orally once a day

## 2020-04-02 NOTE — DISCHARGE NOTE PROVIDER - NSDCCAREPROVSEEN_GEN_ALL_CORE_FT
[facility],  Care Model 9S B [facility],  Care Model 9S B  Abner Nava

## 2020-04-02 NOTE — DISCHARGE NOTE PROVIDER - NSDCCPCAREPLAN_GEN_ALL_CORE_FT
PRINCIPAL DISCHARGE DIAGNOSIS  Diagnosis: Multiple myeloma  Assessment and Plan of Treatment: imaging shows a T2 compression fracture with mild retropulsion of bone into the spinal canal   Radiationwill not fix a retropulsed bone.  Other options include single fraction inpatient radiation but pain management will be the optimal goal since radiation will not provide a rapid response.   -f/u  Radiation Oncology for outpatient radiation treatment   -As per neurosurgery no surgical intervention can be offered at this time can follow up with Dr Lynch Neurosurgery in 1-2 weeks   -f/u with your pain management specialist you can call your insurance company to connect you to a pain management specialist   [phone].      SECONDARY DISCHARGE DIAGNOSES  Diagnosis: Failure to thrive in adult  Assessment and Plan of Treatment: Failure to thrive in adult    Diagnosis: Essential hypertension  Assessment and Plan of Treatment: Essential hypertension PRINCIPAL DISCHARGE DIAGNOSIS  Diagnosis: Multiple myeloma  Assessment and Plan of Treatment: #Multiple myeloma as known from prior records and also seen on bone marrow biopsy. Due to no treatment before hospitalization, the disease worsened, involving the bones. The imaging of the spine (MRI) shows fracture of the vertebrae (spine bones). You were seen by neurosurgery who did not recommend urgent treatment at this time. You were seen by    Other options include single fraction inpatient radiation but pain management will be the optimal goal since radiation will not provide a rapid response.   -   -f/u  Radiation Oncology for outpatient radiation treatment   -As per neurosurgery no surgical intervention can be offered at this time can follow up with Dr Lynch Neurosurgery in 1-2 weeks   -f/u with your pain management specialist you can call your insurance company to connect you to a pain management specialist   [phone].      SECONDARY DISCHARGE DIAGNOSES  Diagnosis: Failure to thrive in adult  Assessment and Plan of Treatment: Due to untreated multiple myeloma before hospitalization.    Diagnosis: Essential hypertension  Assessment and Plan of Treatment: Blood pressure normal without need for medications. PRINCIPAL DISCHARGE DIAGNOSIS  Diagnosis: Multiple myeloma  Assessment and Plan of Treatment: Multiple myeloma (blood cell cancer) as known from prior records and also seen on bone marrow biopsy. Due to no treatment before hospitalization, the disease worsened, involving the bones. The imaging of the spine (MRI) shows fracture of the vertebrae (spine bones). You were seen by neurosurgery who did not recommend urgent treatment at this time. You were seen by hematology oncology, and were treated with chemotherapy for the multiple myeloma. You need to make sure you follow-up with hematology-oncology at [facility] to continue treatment.   - You will follow-up with hematology-oncology at [facility] to continue treatment for the multiple myeloma. Discuss with them the role of radiation therapy - f/u  Radiation Oncology for outpatient radiation treatment   -As per neurosurgery no surgical intervention can be offered at this time can follow up with Dr Lynch Neurosurgery in 1-2 weeks   -f/u with your pain management specialist you can call your insurance company to connect you to a pain management specialist .      SECONDARY DISCHARGE DIAGNOSES  Diagnosis: Failure to thrive in adult  Assessment and Plan of Treatment: Due to untreated multiple myeloma before hospitalization.    Diagnosis: Essential hypertension  Assessment and Plan of Treatment: Blood pressure normal without need for medications.

## 2020-04-02 NOTE — PROGRESS NOTE ADULT - ASSESSMENT
60 yo M hx of MM (not in treatment) and HTN who presented for uncontrollable pain, hematology consulted for hx of MM.    #History of IgG Kappa Multiple Myeloma  - received 3 cycles RVD in 2018, followed by Pomalyst as patient had a severe skin reaction to Revlimid  - obtain SPEP, UPEP, serum and urine immunofixation, urine FLC, all pending. Urine studies have been ordered but not sent.  - Serum FLC ratio 432  - IGG >8000  - B2 microglobulin 6.6  -   - MRIs showing widespread myelomatous changes, no large areas of epidural tumor extension.  multiple pathologic fractures most pronounced at T2 level.  L4 compression fracture noted again as seen on CT Pelvis end of February.   - rad onc consulted, stated they do not believe RT will help.  May be can be done as outpt.  Neurosurgery resident note stated they defer to Rad onc, would not intervene at this time after discussion with patient.   - hepatitis panel normal  - HIV pending, skeletal survey pending  - BMBx performed on 3/31  - C1D1 Velcade/Dexamethasone on 4/1.  Will plan for D4 on 4/4 (Saturday)  - c/w acyclovir PPx    Kateryna Tobias DO  Hematology/Oncology Fellow, PGY5  Pager: [phone]

## 2020-04-02 NOTE — DISCHARGE NOTE PROVIDER - NSDCCPTREATMENT_GEN_ALL_CORE_FT
PRINCIPAL PROCEDURE  Procedure: MRI cervical spine  Findings and Treatment: 3/31/20  Widespread myelomatous changes are noted throughout the entire spine. No large areas of epidural tumor extension are noted.   Multiple pathologic compression fractures are noted with distribution as described, most pronounced at the T2 level. A completely collapsed T2 vertebral body (pathologic vertebral plana deformity) is present with mild retropulsion of bone into the spinal canal. Moderate central canal stenosis is present secondary to the collapsed vertebral body with indentation of the ventral cord. No associated spinal cord edema is present.

## 2020-04-02 NOTE — PROGRESS NOTE ADULT - SUBJECTIVE AND OBJECTIVE BOX
Patient currently at radiology.  Heme note reviewed.  Patient to begin chemo today.  RT felt that they didn't have much to offer and recommended pain management if pain can't be controlled otherwise.    Vital Signs Last 24 Hrs  T(C): 36.6 (02 Apr 2020 06:05), Max: 37.2 (01 Apr 2020 13:21)  T(F): 97.8 (02 Apr 2020 06:05), Max: 98.9 (01 Apr 2020 13:21)  HR: 96 (02 Apr 2020 06:05) (96 - 100)  BP: 123/90 (02 Apr 2020 06:05) (123/86 - 130/81)  BP(mean): --  RR: 18 (02 Apr 2020 06:05) (18 - 18)  SpO2: 100% (02 Apr 2020 06:05) (100% - 100%)  Daily     Daily   I&O's Summary      Exam: patient off doran    MEDICATIONS  (STANDING):  acyclovir   Oral Tab/Cap 400 milliGRAM(s) Oral daily  heparin  Injectable 5000 Unit(s) IV Push once  heparin  Injectable 5000 Unit(s) SubCutaneous every 8 hours  influenza   Vaccine 0.5 milliLiter(s) IntraMuscular once  lidocaine 2% (Preservative-free) Injectable 20 milliLiter(s) Local Injection once  senna 2 Tablet(s) Oral at bedtime  sodium chloride 0.9%. 1000 milliLiter(s) (100 mL/Hr) IV Continuous <Continuous>    MEDICATIONS  (PRN):  acetaminophen   Tablet .. 650 milliGRAM(s) Oral every 4 hours PRN Mild Pain (1 - 3)  magnesium hydroxide Suspension 30 milliLiter(s) Oral daily PRN Constipation  morphine  - Injectable 2 milliGRAM(s) IV Push every 2 hours PRN Severe Pain (7 - 10)  oxyCODONE    IR 5 milliGRAM(s) Oral every 4 hours PRN Moderate Pain (4 - 6)      04-02    129<L>  |  100  |  25<H>  ----------------------------<  132<H>  4.4   |  21<L>  |  0.89    Ca    10.3      02 Apr 2020 06:00  Phos  3.5     04-02  Mg     1.7     04-02    TPro  12.3<H>  /  Alb  x   /  TBili  x   /  DBili  x   /  AST  x   /  ALT  x   /  AlkPhos  x   04-01                          9.3    8.56  )-----------( 369      ( 02 Apr 2020 06:00 )             28.4         HEALTH ISSUES - PROBLEM Dx:  Multiple myeloma: as per Dr. Carney  Constipation: Senna and MOM   Unsteadiness (secondary to pain): physical therapy  Back pain from MM: pain management as needed

## 2020-04-02 NOTE — PROGRESS NOTE ADULT - SUBJECTIVE AND OBJECTIVE BOX
Afebrile overnight.      General: denies fevers, chills  Skin/Breast: denies rash   Ophthalmologic: denies blurry vision  ENMT: denies throat pain  Respiratory and Thorax: denies cough, denies shortness of breath  Cardiovascular: denies chest pain, palpitations. Denies LE swelling   Gastrointestinal: denies abdominal pain/ nausea/ vomiting/ diarrhea. Denies BRBPR/ melena   Genitourinary: Denies dysuria  Musculoskeletal: Denies mylagias   Neurological: Denies syncope  Psychiatric: Denies mood disturbance   Hematology/Lymphatics: denies bleeding/bruising. Denies skin lumps 	    Vital Signs Last 24 Hrs  T(C): 36.6 (02 Apr 2020 06:05), Max: 37.2 (01 Apr 2020 13:21)  T(F): 97.8 (02 Apr 2020 06:05), Max: 98.9 (01 Apr 2020 13:21)  HR: 96 (02 Apr 2020 06:05) (96 - 100)  BP: 123/90 (02 Apr 2020 06:05) (123/86 - 130/81)  BP(mean): --  RR: 18 (02 Apr 2020 06:05) (18 - 18)  SpO2: 100% (02 Apr 2020 06:05) (100% - 100%)  PHYSICAL EXAM:    GENERAL: NAD, AAOx3   HEAD:  NC/AT  EYES: EOMI, PERRLA, no scleral icterus  HEENT: Moist mucous membranes  LUNG: Clear to auscultation bilaterally; No rales, rhonchi, wheezing, or rubs  HEART: RRR; No murmurs, rubs, or gallops  ABDOMEN: +BS, ST/ND/NT  EXTREMITIES:  2+ Peripheral Pulses, No clubbing, cyanosis, or edema  LAD: no palpable adenopathy  04-02    129<L>  |  100  |  25<H>  ----------------------------<  132<H>  4.4   |  21<L>  |  0.89    Ca    10.3      02 Apr 2020 06:00  Phos  3.5     04-02  Mg     1.7     04-02    TPro  12.3<H>  /  Alb  x   /  TBili  x   /  DBili  x   /  AST  x   /  ALT  x   /  AlkPhos  x   04-01      CBC Full  -  ( 02 Apr 2020 06:00 )  WBC Count : 8.56 K/uL  RBC Count : 2.99 M/uL  Hemoglobin : 9.3 g/dL  Hematocrit : 28.4 %  Platelet Count - Automated : 369 K/uL  Mean Cell Volume : 95.0 fL  Mean Cell Hemoglobin : 31.1 pg  Mean Cell Hemoglobin Concentration : 32.7 %  Auto Neutrophil # : x  Auto Lymphocyte # : x  Auto Monocyte # : x  Auto Eosinophil # : x  Auto Basophil # : x  Auto Neutrophil % : x  Auto Lymphocyte % : x  Auto Monocyte % : x  Auto Eosinophil % : x  Auto Basophil % : x      LIVER FUNCTIONS - ( 01 Apr 2020 03:16 )  Alb: x     / Pro: 12.3 g/dL / ALK PHOS: x     / ALT: x     / AST: x     / GGT: x

## 2020-04-02 NOTE — DISCHARGE NOTE PROVIDER - NSFOLLOWUPCLINICS_GEN_ALL_ED_FT
[facility] [place]  Internal Medicine  [address] [place] [address]  Phone: [phone]  Fax: [phone]  Follow Up Time: [facility] [place]  Internal Medicine  [address] [place] [address]  Phone: [phone]  Fax: [phone]    [facility]  Hematology/Oncology  [address] [place] [address]  Phone: [phone]  Fax:   Follow Up Time:

## 2020-04-02 NOTE — PROGRESS NOTE ADULT - ATTENDING COMMENTS
59 year old male with multiple myeloma as discussed in above note and my prior notes. Given his elevated beta 2 microglobulin and low serum albumin he has a International staging system rating of III (three). This group has significant disease and a lower median survival than stage I or II.  He received bortezomib on 04/01 and we will schedule second dosing of bortezomib on Saturday.  I would recommend pamidronate to be given for significant bone disease. He has an elevated serum ionized calcium (adjusted for his low serum albumin.  Discussion of treatment plan and medications with the patient. He states that he lives alone and he will need transportation assistance for referral to [facility] to continue treatment. Mr Robert receives reinforcement of the idea to remain on therapy

## 2020-04-02 NOTE — DISCHARGE NOTE PROVIDER - HOSPITAL COURSE
rad onc:     59 year old male with a h/o myeloma and numerous compression fractures seen on imaging with a T2 collapsed vertebrae.     Given that imaging shows a T2 compression fracture with mild retropulsion of bone into the spinal canal we defer to neurosurgery.   Radiation    will not fix a retropulsed bone.  Other options include single fraction inpatient radiation but pain management will be the optimal goal since radiation will not provide a rapid response.  Pain management followed by possible discharge and outpatient radiation is another option if surgical intervention cannot be done.     We will continue to follow.         [phone].             neurosurgery:     MR reviewed. Significant T1 compression fracture and multiple other compression fractures likely 2/2 multiple myeloma. Discussed with patient operative route would  be significant. Given asymptomatic from his t1 compression fracture and patient's preference, defer to radiation oncology and heme onc and conservative management. 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 New vertebral compression fractures MRI. Also with hyponatremia - isoosmolar hypoNa with high serum protein prior - pseudohyponatremia likely due to hyperparaproteinemia.        To continue chemo at [facility] outpatient.         Stable for discharge with close f/u with hem/onc at [facility], neurosurgery to re-eval for any need for surgery, rad/onc to eval for any role for radiation. 58 yo M with hx multiple myeloma (untreated prior to admission, hx of tx in 2018) and HTN who p/w uncontrollable pain (back, hx vertebral compression fractures) limiting ambulation, admitted 3/29/2020 for pain control (oxycodone), MM treatment (hem/onc following, s/p BM bx 3/31 plasma cell neoplasm, received C1D1 velcade/dexamethasone per hem/onc x 3, last one 4/8), remains admitted for continued MM treatment and pain control. New vertebral compression fractures MRI. Skeletal survey also with diffuse lytic lesions. Also with hyponatremia - isoosmolar hypoNa with high serum protein prior - pseudohyponatremia likely due to hyperparaproteinemia.        To continue chemo at [facility] outpatient.         Stable for discharge with close f/u with hem/onc at [facility], neurosurgery to re-eval for any need for surgery, rad/onc to eval for any role for radiation. 60 yo M with hx multiple myeloma (untreated prior to admission, hx of tx in 2018) and HTN who p/w uncontrollable pain (back, hx vertebral compression fractures) limiting ambulation, admitted 3/29/2020 for pain control (oxycodone), MM treatment (hem/onc following, s/p BM bx 3/31 plasma cell neoplasm, received C1D1 velcade/dexamethasone per hem/onc x 3, last one 4/8), admitted for continued MM treatment and pain control (with oxycodone and Marinol). New vertebral compression fractures MRI. Skeletal survey also with diffuse lytic lesions. Also with hyponatremia - isoosmolar hypoNa with high serum protein prior - pseudohyponatremia likely due to hyperparaproteinemia.        To continue chemo at [facility] outpatient.         Stable for discharge with close f/u with hem/onc at [facility], neurosurgery to re-eval for any need for surgery, rad/onc to eval for any role for radiation.

## 2020-04-03 DIAGNOSIS — C90.00 MULTIPLE MYELOMA NOT HAVING ACHIEVED REMISSION: ICD-10-CM

## 2020-04-03 DIAGNOSIS — Z51.5 ENCOUNTER FOR PALLIATIVE CARE: ICD-10-CM

## 2020-04-03 LAB
ANION GAP SERPL CALC-SCNC: 6 MMO/L — LOW (ref 7–14)
BUN SERPL-MCNC: 27 MG/DL — HIGH (ref 7–23)
CALCIUM SERPL-MCNC: 9.2 MG/DL — SIGNIFICANT CHANGE UP (ref 8.4–10.5)
CHLORIDE SERPL-SCNC: 101 MMOL/L — SIGNIFICANT CHANGE UP (ref 98–107)
CO2 SERPL-SCNC: 22 MMOL/L — SIGNIFICANT CHANGE UP (ref 22–31)
CREAT SERPL-MCNC: 0.94 MG/DL — SIGNIFICANT CHANGE UP (ref 0.5–1.3)
GAS PNL BLDMV: SIGNIFICANT CHANGE UP
GLUCOSE SERPL-MCNC: 101 MG/DL — HIGH (ref 70–99)
HCT VFR BLD CALC: 26.9 % — LOW (ref 39–50)
HGB BLD-MCNC: 8.5 G/DL — LOW (ref 13–17)
MAGNESIUM SERPL-MCNC: 2 MG/DL — SIGNIFICANT CHANGE UP (ref 1.6–2.6)
MCHC RBC-ENTMCNC: 30.9 PG — SIGNIFICANT CHANGE UP (ref 27–34)
MCHC RBC-ENTMCNC: 31.6 % — LOW (ref 32–36)
MCV RBC AUTO: 97.8 FL — SIGNIFICANT CHANGE UP (ref 80–100)
NRBC # FLD: 0 K/UL — SIGNIFICANT CHANGE UP (ref 0–0)
PHOSPHATE SERPL-MCNC: 2.4 MG/DL — LOW (ref 2.5–4.5)
PLATELET # BLD AUTO: 352 K/UL — SIGNIFICANT CHANGE UP (ref 150–400)
PMV BLD: 8.9 FL — SIGNIFICANT CHANGE UP (ref 7–13)
POTASSIUM SERPL-MCNC: 3.9 MMOL/L — SIGNIFICANT CHANGE UP (ref 3.5–5.3)
POTASSIUM SERPL-SCNC: 3.9 MMOL/L — SIGNIFICANT CHANGE UP (ref 3.5–5.3)
RBC # BLD: 2.75 M/UL — LOW (ref 4.2–5.8)
RBC # FLD: 15.3 % — HIGH (ref 10.3–14.5)
SODIUM SERPL-SCNC: 129 MMOL/L — LOW (ref 135–145)
WBC # BLD: 7.71 K/UL — SIGNIFICANT CHANGE UP (ref 3.8–10.5)
WBC # FLD AUTO: 7.71 K/UL — SIGNIFICANT CHANGE UP (ref 3.8–10.5)

## 2020-04-03 PROCEDURE — 99233 SBSQ HOSP IP/OBS HIGH 50: CPT

## 2020-04-03 PROCEDURE — 99232 SBSQ HOSP IP/OBS MODERATE 35: CPT | Mod: GC

## 2020-04-03 PROCEDURE — 99223 1ST HOSP IP/OBS HIGH 75: CPT

## 2020-04-03 RX ORDER — BORTEZOMIB 2.5 MG/1
2.3 INJECTION INTRAVENOUS ONCE
Refills: 0 | Status: COMPLETED | OUTPATIENT
Start: 2020-04-04 | End: 2020-04-04

## 2020-04-03 RX ORDER — PANTOPRAZOLE SODIUM 20 MG/1
40 TABLET, DELAYED RELEASE ORAL
Refills: 0 | Status: DISCONTINUED | OUTPATIENT
Start: 2020-04-03 | End: 2020-04-09

## 2020-04-03 RX ORDER — ONDANSETRON 8 MG/1
8 TABLET, FILM COATED ORAL ONCE
Refills: 0 | Status: COMPLETED | OUTPATIENT
Start: 2020-04-04 | End: 2020-04-04

## 2020-04-03 RX ADMIN — Medication 400 MILLIGRAM(S): at 12:41

## 2020-04-03 RX ADMIN — HEPARIN SODIUM 5000 UNIT(S): 5000 INJECTION INTRAVENOUS; SUBCUTANEOUS at 05:59

## 2020-04-03 RX ADMIN — SENNA PLUS 2 TABLET(S): 8.6 TABLET ORAL at 21:23

## 2020-04-03 RX ADMIN — HEPARIN SODIUM 5000 UNIT(S): 5000 INJECTION INTRAVENOUS; SUBCUTANEOUS at 21:23

## 2020-04-03 RX ADMIN — HEPARIN SODIUM 5000 UNIT(S): 5000 INJECTION INTRAVENOUS; SUBCUTANEOUS at 15:29

## 2020-04-03 RX ADMIN — OXYCODONE HYDROCHLORIDE 5 MILLIGRAM(S): 5 TABLET ORAL at 15:32

## 2020-04-03 NOTE — CONSULT NOTE ADULT - PROBLEM SELECTOR RECOMMENDATION 9
When walking, able to sleep at night, pain does not wake him.  Tolerating oxy well.  Will continue with oxycodone 5mg PRO q4hrs, will continue to follow usage to determine if he will require long acting medication.   Continue with bowel regimen to prevent opioid induced constipation.

## 2020-04-03 NOTE — CONSULT NOTE ADULT - SUBJECTIVE AND OBJECTIVE BOX
HPI:  Rolf Robert is a 59 year old man with a history of multiple myeloma (not in treatment) and HTN who presents for uncontrolled pain.    He was recently in the ED with similar complaints with a CT pelvis 2/25 showing diffuse lytic lesions in the pelvis and lower lumbar spine and an L4 compression fracture, also severe R and moderate L hip arthrosis.  He was discharged home with plan for oncology follow up.    Regarding his pain, he describes months of sharp pain worsening over the last 2-3 weeks and uncontrolled by the advil that he was trying at home.  He denies any fevers, chills, cough. Rates it as an 8/10 pain worse with palpation.  Due to his pain, he has been unable to walk.  Due to this, he called EMS and was brought to [facility] for evaluation.    In the ED, he was afebrile, tachycardic to 102-110, with otherwise unremarkable vital signs.  Diagnostics revealed a Hgb 9.4, protein 13.6, albumin 2.8, CK 91.  He was given morphine admitted for further management.    On evaluation, he gave the above history. (29 Mar 2020 00:43)    PERTINENT PM/SXH:   HTN (hypertension)  Multiple myeloma    No significant past surgical history    FAMILY HISTORY:  No pertinent family history in first degree relatives    ITEMS NOT CHECKED ARE NOT PRESENT    SOCIAL HISTORY:   Significant other/partner:  [ ]  Children:  [x ]  Mu-ism/Spirituality:  Substance hx:  [ ]   Tobacco hx:  [ ]   Alcohol hx: [ ]   Home Opioid hx:  [ ] I-Stop Reference No:  Living Situation: [x ]Home  [ ]Long term care  [ ]Rehab [ ]Other    ADVANCE DIRECTIVES:    DNR  MOLST  [ ]  Living Will  [ ]   DECISION MAKER(s):  [ ] Health Care Proxy(s)  [x ] Surrogate(s)  [ ] Guardian           Name(s): Phone Number(s):  Has a daughter but has no relationship with her  Mainly it's his siblings Sister Charla #[phone]  Thinking of appointing as HCP a friend Ms. Day    BASELINE (I)ADL(s) (prior to admission):  Tre[place]len: [x ]Total  [ ] Moderate [ ]Dependent    Allergies    No Known Allergies    Intolerances    MEDICATIONS  (STANDING):  acyclovir   Oral Tab/Cap 400 milliGRAM(s) Oral daily  heparin  Injectable 5000 Unit(s) IV Push once  heparin  Injectable 5000 Unit(s) SubCutaneous every 8 hours  influenza   Vaccine 0.5 milliLiter(s) IntraMuscular once  lidocaine 2% (Preservative-free) Injectable 20 milliLiter(s) Local Injection once  multivitamin 1 Tablet(s) Oral daily  pantoprazole    Tablet 40 milliGRAM(s) Oral before breakfast  senna 2 Tablet(s) Oral at bedtime  sodium chloride 0.9%. 1000 milliLiter(s) (100 mL/Hr) IV Continuous <Continuous>    MEDICATIONS  (PRN):  acetaminophen   Tablet .. 650 milliGRAM(s) Oral every 4 hours PRN Mild Pain (1 - 3)  magnesium hydroxide Suspension 30 milliLiter(s) Oral daily PRN Constipation  morphine  - Injectable 2 milliGRAM(s) IV Push every 2 hours PRN Severe Pain (7 - 10)  oxyCODONE    IR 5 milliGRAM(s) Oral every 4 hours PRN Moderate Pain (4 - 6)    PRESENT SYMPTOMS: [ ]Unable to obtain due to poor mentation   Source if other than patient:  [ ]Family   [ ]Team     Pain (Impact on QOL):  difficulty walking due to pain  Location -  R flank and hip  Minimal acceptable level (0-10 scale): 2-3                   Aggravating factors - movement, walking  Quality - aching, sharp  Radiation - none  Severity (0-10 scale) -  7-8/10  Timing - intermittent    PAIN AD Score:     http://[email or web address]/[email or web address] (press ctrl +  left click to view)    Dyspnea:                           [ ]Mild [ ]Moderate [ ]Severe  Anxiety:                             [ ]Mild [ ]Moderate [ ]Severe  Fatigue:                             [ ]Mild [ ]Moderate [ ]Severe  Nausea:                             [ ]Mild [ ]Moderate [ ]Severe  Loss of appetite:              [ ]Mild [ ]Moderate [ ]Severe  Constipation:                    [ ]Mild [ ]Moderate [ ]Severe    Other Symptoms:  [x]All other review of systems negative     Karnofsky Performance Score/Palliative Performance Status Version 2:  50 %    http://palliative.info/resource_material/PPSv2.pdf    PHYSICAL EXAM:  Vital Signs Last 24 Hrs  T(C): 36.6 (03 Apr 2020 06:27), Max: 37.1 (02 Apr 2020 13:39)  T(F): 97.9 (03 Apr 2020 06:27), Max: 98.7 (02 Apr 2020 13:39)  HR: 70 (03 Apr 2020 06:27) (70 - 93)  BP: 125/85 (03 Apr 2020 06:27) (125/85 - 143/85)  BP(mean): --  RR: 20 (03 Apr 2020 06:27) (17 - 20)  SpO2: 100% (03 Apr 2020 06:27) (99% - 100%) I&O's Summary    GENERAL:  [ x]Alert  [ x]Oriented x 3  [ ]Lethargic  [ ]Cachexia  [ ]Unarousable  [ x]Verbal  [ ]Non-Verbal  Behavioral:   [ ] Anxiety  [ ] Delirium [ ] Agitation [ ] Other  HEENT:  [x ]Normal   [ ]Dry mouth   [ ]ET Tube/Trach  [ ]Oral lesions  PULMONARY:   [x ]Clear [ ]Tachypnea  [ ]Audible excessive secretions   [ ]Rhonchi        [ ]Right [ ]Left [ ]Bilateral  [ ]Crackles        [ ]Right [ ]Left [ ]Bilateral  [ ]Wheezing     [ ]Right [ ]Left [ ]Bilateral  CARDIOVASCULAR:    [x ]Regular [ ]Irregular [ ]Tachy  [ ]Lucho [ ]Murmur [ ]Other  GASTROINTESTINAL:  [x ]Soft  [ ]Distended   [x ]+BS  [ ]Non tender [ ]Tender  [ ]PEG [ ]OGT/ NGT  Last BM: yesterday  GENITOURINARY:  [x ]Normal [ ] Incontinent   [ ]Oliguria/Anuria   [ ]A[place]  MUSCULOSKELETAL:   [x ]Normal   [ ]Weakness  [ ]Bed/Wheelchair bound [ ]Edema  NEUROLOGIC:   [x ]No focal deficits  [ ] Cognitive impairment  [ ] Dysphagia [ ]Dysarthria [ ] Paresis [ ]Other   SKIN:   [ x]Normal   [ ]Pressure ulcer(s)  [ ]Rash    CRITICAL CARE:  [ ] Shock Present  [ ]Septic [ ]Cardiogenic [ ]Neurologic [ ]Hypovolemic  [ ]  Vasopressors [ ]  Inotropes   [ ] Respiratory failure present  [ ] Acute  [ ] Chronic [ ] Hypoxic  [ ] Hypercarbic [ ] Other  [ ] Other organ failure     GRIEF  x[ ] Yes  [ ] No    LABS:                        8.5    7.71  )-----------( 352      ( 03 Apr 2020 05:53 )             26.9   04-03    129<L>  |  101  |  27<H>  ----------------------------<  101<H>  3.9   |  22  |  0.94    Ca    9.2      03 Apr 2020 05:53  Phos  2.4     04-03  Mg     2.0     04-03    RADIOLOGY & ADDITIONAL STUDIES:     < from: MR Lumbar Spine w/wo IV Cont (03.31.20 @ 12:06) >    IMPRESSION:    Widespread myelomatous changes are noted throughout the entire spine. No large areas of epidural tumor extension are noted.     Multiple pathologic compression fractures are noted with distribution as described, most pronounced at the T2 level. A completely collapsed T2 vertebral body (pathologic vertebral plana deformity) is present with mild retropulsion of bone into the spinal canal. Moderate central canal stenosis is present secondary to the collapsed vertebral body with indentation of the ventral cord. No associated spinal cord edema is present.    PROTEIN CALORIE MALNUTRITION PRESENT: [ ] Yes [ ] No  [ ] PPSV2 < or = to 30% [ ] significant weight loss  [ ] poor nutritional intake [ ] catabolic state [ ] anasarca     Albumin, Serum: 2.8 g/dL (03-28-20 @ 18:35)  Artificial Nutrition [ ]     REFERRALS:   [ ]Chaplaincy  [ ] Hospice  [ ]Child Life  [ ]Social Work  [ ]Case management [ ]Holistic Therapy   Goals of Care Discussion Document:

## 2020-04-03 NOTE — CONSULT NOTE ADULT - ASSESSMENT
59 year old man with a history of multiple myeloma (not in treatment) and HTN who presents for uncontrolled pain. Palliative Care consulted for complex symptom management in the setting of mm.

## 2020-04-03 NOTE — CONSULT NOTE ADULT - REASON FOR ADMISSION
Problem: Patient Care Overview  Goal: Plan of Care Review  Outcome: Ongoing (interventions implemented as appropriate)   02/10/19 9619   Coping/Psychosocial   Plan of Care Reviewed With patient   OTHER   Outcome Summary Pt alert and agreeable to therapy with encouragement. CGA bed mobility via logroll. Education reinforced for spinal precautions and use of AE to maximize ADL independence with good teach back. HEP completed in supine for pain mgmt. OT to follow.          
CC: pain

## 2020-04-03 NOTE — PROGRESS NOTE ADULT - ASSESSMENT
58 yo M hx of MM (not in treatment) and HTN who presented for uncontrollable pain, hematology consulted for hx of MM.    #History of IgG Kappa Multiple Myeloma  - received 3 cycles RVD in 2018, followed by Pomalyst as patient had a severe skin reaction to Revlimid  - obtain SPEP, UPEP, urine immunofixation, urine FLC, all pending. Urine studies have been ordered but not sent.  - Serum LATISHA confirming IgG kappa MM  - Serum FLC ratio 432  - IGG >8000  - B2 microglobulin 6.6  -   - MRIs showing widespread myelomatous changes, no large areas of epidural tumor extension.  multiple pathologic fractures most pronounced at T2 level.  L4 compression fracture noted again as seen on CT Pelvis end of February.   - rad onc consulted, stated they do not believe RT will help.  May be can be done as outpt.  Neurosurgery resident note stated they defer to Rad onc, would not intervene at this time after discussion with patient.   - hepatitis panel normal  - HIV pending, skeletal survey pending  - BMBx performed on 3/31, confirming > 70% plasma cell neoplasm  - C1D1 Velcade/Dexamethasone on 4/1.  Will plan for D4 on 4/4 (Saturday)  - c/w acyclovir PPx    Kateryna Tobias DO  Hematology/Oncology Fellow, PGY5  Pager: [phone]

## 2020-04-03 NOTE — PROGRESS NOTE ADULT - SUBJECTIVE AND OBJECTIVE BOX
Afebrile overnight.    General: denies fevers, chills  Skin/Breast: denies rash   Ophthalmologic: denies blurry vision  ENMT: denies throat pain  Respiratory and Thorax: denies cough, denies shortness of breath  Cardiovascular: denies chest pain, palpitations. Denies LE swelling   Gastrointestinal: denies abdominal pain/ nausea/ vomiting/ diarrhea. Denies BRBPR/ melena   Genitourinary: Denies dysuria  Musculoskeletal: Denies mylagias   Neurological: Denies syncope  Psychiatric: Denies mood disturbance   Hematology/Lymphatics: denies bleeding/bruising. Denies skin lumps 	    Vital Signs Last 24 Hrs  T(C): 36.6 (03 Apr 2020 06:27), Max: 37.1 (02 Apr 2020 13:39)  T(F): 97.9 (03 Apr 2020 06:27), Max: 98.7 (02 Apr 2020 13:39)  HR: 70 (03 Apr 2020 06:27) (70 - 93)  BP: 125/85 (03 Apr 2020 06:27) (125/85 - 143/85)  BP(mean): --  RR: 20 (03 Apr 2020 06:27) (17 - 20)  SpO2: 100% (03 Apr 2020 06:27) (99% - 100%)  PHYSICAL EXAM:    GENERAL: NAD, AAOx3   HEAD:  NC/AT  EYES: EOMI, PERRLA, no scleral icterus  HEENT: Moist mucous membranes  LUNG: Clear to auscultation bilaterally; No rales, rhonchi, wheezing, or rubs  HEART: RRR; No murmurs, rubs, or gallops  ABDOMEN: +BS, ST/ND/NT  EXTREMITIES:  2+ Peripheral Pulses, No clubbing, cyanosis, or edema  LAD: no palpable adenopathy  04-03    129<L>  |  101  |  27<H>  ----------------------------<  101<H>  3.9   |  22  |  0.94    Ca    9.2      03 Apr 2020 05:53  Phos  2.4     04-03  Mg     2.0     04-03        CBC Full  -  ( 03 Apr 2020 05:53 )  WBC Count : 7.71 K/uL  RBC Count : 2.75 M/uL  Hemoglobin : 8.5 g/dL  Hematocrit : 26.9 %  Platelet Count - Automated : 352 K/uL  Mean Cell Volume : 97.8 fL  Mean Cell Hemoglobin : 30.9 pg  Mean Cell Hemoglobin Concentration : 31.6 %  Auto Neutrophil # : x  Auto Lymphocyte # : x  Auto Monocyte # : x  Auto Eosinophil # : x  Auto Basophil # : x  Auto Neutrophil % : x  Auto Lymphocyte % : x  Auto Monocyte % : x  Auto Eosinophil % : x  Auto Basophil % : x

## 2020-04-03 NOTE — CONSULT NOTE ADULT - PROBLEM SELECTOR RECOMMENDATION 3
Pt is full code  Surrogate is siblings, thinking of appointing friend as HCP, will f/u   Will continue to follow, for uncontrolled symptoms please page [phone]

## 2020-04-03 NOTE — PROGRESS NOTE ADULT - ATTENDING COMMENTS
an outpatient MR TRISTON Robert is a patient with IgG kappa myeloma and severe bone disease. He is now planning for day 4 dosing of bortezomib on 04/04/2020. He received pamidronate yesterday in the treatment of bone disease and hypercalcemia. The patient will need social service assistance to arrange transportation to [facility] as an outpatient.  I discussed good nutrition habits with him and he may take one multivitamin daily as an outpatient.  The patietn is focussing on imprroving his immune system and I have told him that with treatment directed at removal of the myeloma, hopefully the immune system will improve MR TRISTON Robert is a patient with IgG kappa myeloma and severe bone disease. He is now planning for day 4 dosing of bortezomib on 04/04/2020. He received pamidronate yesterday in the treatment of bone disease and hypercalcemia. The patient will need social service assistance to arrange transportation to [facility] as an outpatient.  I discussed good nutrition habits with him and he may take one multivitamin daily as an outpatient.  The patient is focussing on improving his immune system and I have told him that with treatment directed at removal of the myeloma, hopefully the immune system will improve

## 2020-04-03 NOTE — PROGRESS NOTE ADULT - SUBJECTIVE AND OBJECTIVE BOX
Patient started on chemo yesterday.  So far no issues.    Vital Signs Last 24 Hrs  T(C): 36.6 (03 Apr 2020 06:27), Max: 37.1 (02 Apr 2020 13:39)  T(F): 97.9 (03 Apr 2020 06:27), Max: 98.7 (02 Apr 2020 13:39)  HR: 70 (03 Apr 2020 06:27) (70 - 93)  BP: 125/85 (03 Apr 2020 06:27) (125/85 - 143/85)  BP(mean): --  RR: 20 (03 Apr 2020 06:27) (17 - 20)  SpO2: 100% (03 Apr 2020 06:27) (99% - 100%)  Daily     Daily   I&O's Summary      Neck: no bruits, JVD, adenopathy  Chest: clear  Cor: [id number], RR, normal S1S2 with no mrght  Abd: soft, nontender, BS+ and no HSM  Ext: w/o CCE  Pulses:2+->dp bilaterally    MEDICATIONS  (STANDING):  acyclovir   Oral Tab/Cap 400 milliGRAM(s) Oral daily  heparin  Injectable 5000 Unit(s) IV Push once  heparin  Injectable 5000 Unit(s) SubCutaneous every 8 hours  influenza   Vaccine 0.5 milliLiter(s) IntraMuscular once  lidocaine 2% (Preservative-free) Injectable 20 milliLiter(s) Local Injection once  pantoprazole    Tablet 40 milliGRAM(s) Oral before breakfast  senna 2 Tablet(s) Oral at bedtime  sodium chloride 0.9%. 1000 milliLiter(s) (100 mL/Hr) IV Continuous <Continuous>    MEDICATIONS  (PRN):  acetaminophen   Tablet .. 650 milliGRAM(s) Oral every 4 hours PRN Mild Pain (1 - 3)  magnesium hydroxide Suspension 30 milliLiter(s) Oral daily PRN Constipation  morphine  - Injectable 2 milliGRAM(s) IV Push every 2 hours PRN Severe Pain (7 - 10)  oxyCODONE    IR 5 milliGRAM(s) Oral every 4 hours PRN Moderate Pain (4 - 6)      04-03    129<L>  |  101  |  27<H>  ----------------------------<  101<H>  3.9   |  22  |  0.94    Ca    9.2      03 Apr 2020 05:53  Phos  2.4     04-03  Mg     2.0     04-03                            8.5    7.71  )-----------( 352      ( 03 Apr 2020 05:53 )             26.9         HEALTH ISSUES - PROBLEM Dx:  Back pain,Multiple myeloma: as per Dr. Carney  Essential hypertension: well controlled  Hyponatremia: fluid restrict today.

## 2020-04-04 LAB
ALBUMIN SERPL ELPH-MCNC: 2.6 G/DL — LOW (ref 3.3–5)
ALP SERPL-CCNC: 55 U/L — SIGNIFICANT CHANGE UP (ref 40–120)
ALT FLD-CCNC: SIGNIFICANT CHANGE UP U/L (ref 4–41)
ANION GAP SERPL CALC-SCNC: 10 MMO/L — SIGNIFICANT CHANGE UP (ref 7–14)
ANION GAP SERPL CALC-SCNC: 10 MMO/L — SIGNIFICANT CHANGE UP (ref 7–14)
ANION GAP SERPL CALC-SCNC: 9 MMO/L — SIGNIFICANT CHANGE UP (ref 7–14)
AST SERPL-CCNC: 26 U/L — SIGNIFICANT CHANGE UP (ref 4–40)
BILIRUB SERPL-MCNC: 0.7 MG/DL — SIGNIFICANT CHANGE UP (ref 0.2–1.2)
BUN SERPL-MCNC: 19 MG/DL — SIGNIFICANT CHANGE UP (ref 7–23)
CALCIUM SERPL-MCNC: 8.3 MG/DL — LOW (ref 8.4–10.5)
CALCIUM SERPL-MCNC: 8.8 MG/DL — SIGNIFICANT CHANGE UP (ref 8.4–10.5)
CALCIUM SERPL-MCNC: 8.8 MG/DL — SIGNIFICANT CHANGE UP (ref 8.4–10.5)
CHLORIDE SERPL-SCNC: 100 MMOL/L — SIGNIFICANT CHANGE UP (ref 98–107)
CHLORIDE SERPL-SCNC: 97 MMOL/L — LOW (ref 98–107)
CHLORIDE SERPL-SCNC: 97 MMOL/L — LOW (ref 98–107)
CO2 SERPL-SCNC: 18 MMOL/L — LOW (ref 22–31)
CO2 SERPL-SCNC: 18 MMOL/L — LOW (ref 22–31)
CO2 SERPL-SCNC: 19 MMOL/L — LOW (ref 22–31)
CREAT SERPL-MCNC: 0.77 MG/DL — SIGNIFICANT CHANGE UP (ref 0.5–1.3)
CREAT SERPL-MCNC: 0.77 MG/DL — SIGNIFICANT CHANGE UP (ref 0.5–1.3)
CREAT SERPL-MCNC: 0.92 MG/DL — SIGNIFICANT CHANGE UP (ref 0.5–1.3)
CULTURE RESULTS: SIGNIFICANT CHANGE UP
GLUCOSE SERPL-MCNC: 95 MG/DL — SIGNIFICANT CHANGE UP (ref 70–99)
GLUCOSE SERPL-MCNC: 99 MG/DL — SIGNIFICANT CHANGE UP (ref 70–99)
GLUCOSE SERPL-MCNC: 99 MG/DL — SIGNIFICANT CHANGE UP (ref 70–99)
HCT VFR BLD CALC: 32.5 % — LOW (ref 39–50)
HGB BLD-MCNC: 10.2 G/DL — LOW (ref 13–17)
MAGNESIUM SERPL-MCNC: 1.9 MG/DL — SIGNIFICANT CHANGE UP (ref 1.6–2.6)
MCHC RBC-ENTMCNC: 31 PG — SIGNIFICANT CHANGE UP (ref 27–34)
MCHC RBC-ENTMCNC: 31.4 % — LOW (ref 32–36)
MCV RBC AUTO: 98.8 FL — SIGNIFICANT CHANGE UP (ref 80–100)
NRBC # FLD: 0 K/UL — SIGNIFICANT CHANGE UP (ref 0–0)
OSMOLALITY SERPL: 293 MOSMO/KG — SIGNIFICANT CHANGE UP (ref 275–295)
OSMOLALITY UR: 278 MOSMO/KG — SIGNIFICANT CHANGE UP (ref 50–1200)
PHOSPHATE SERPL-MCNC: 2 MG/DL — LOW (ref 2.5–4.5)
PLATELET # BLD AUTO: 367 K/UL — SIGNIFICANT CHANGE UP (ref 150–400)
PMV BLD: 8.9 FL — SIGNIFICANT CHANGE UP (ref 7–13)
POTASSIUM SERPL-MCNC: 3.5 MMOL/L — SIGNIFICANT CHANGE UP (ref 3.5–5.3)
POTASSIUM SERPL-MCNC: 4.3 MMOL/L — SIGNIFICANT CHANGE UP (ref 3.5–5.3)
POTASSIUM SERPL-MCNC: 4.3 MMOL/L — SIGNIFICANT CHANGE UP (ref 3.5–5.3)
POTASSIUM SERPL-SCNC: 3.5 MMOL/L — SIGNIFICANT CHANGE UP (ref 3.5–5.3)
POTASSIUM SERPL-SCNC: 4.3 MMOL/L — SIGNIFICANT CHANGE UP (ref 3.5–5.3)
POTASSIUM SERPL-SCNC: 4.3 MMOL/L — SIGNIFICANT CHANGE UP (ref 3.5–5.3)
PROT SERPL-MCNC: 12.5 G/DL — HIGH (ref 6–8.3)
RBC # BLD: 3.29 M/UL — LOW (ref 4.2–5.8)
RBC # FLD: 15.3 % — HIGH (ref 10.3–14.5)
SODIUM SERPL-SCNC: 125 MMOL/L — LOW (ref 135–145)
SODIUM SERPL-SCNC: 125 MMOL/L — LOW (ref 135–145)
SODIUM SERPL-SCNC: 128 MMOL/L — LOW (ref 135–145)
SODIUM UR-SCNC: 25 MMOL/L — SIGNIFICANT CHANGE UP
SPECIMEN SOURCE: SIGNIFICANT CHANGE UP
WBC # BLD: 3.9 K/UL — SIGNIFICANT CHANGE UP (ref 3.8–10.5)
WBC # FLD AUTO: 3.9 K/UL — SIGNIFICANT CHANGE UP (ref 3.8–10.5)

## 2020-04-04 PROCEDURE — 99233 SBSQ HOSP IP/OBS HIGH 50: CPT

## 2020-04-04 PROCEDURE — 99232 SBSQ HOSP IP/OBS MODERATE 35: CPT | Mod: GC

## 2020-04-04 RX ORDER — SODIUM CHLORIDE 9 MG/ML
1000 INJECTION INTRAMUSCULAR; INTRAVENOUS; SUBCUTANEOUS
Refills: 0 | Status: DISCONTINUED | OUTPATIENT
Start: 2020-04-04 | End: 2020-04-04

## 2020-04-04 RX ORDER — SODIUM CHLORIDE 9 MG/ML
1000 INJECTION INTRAMUSCULAR; INTRAVENOUS; SUBCUTANEOUS
Refills: 0 | Status: COMPLETED | OUTPATIENT
Start: 2020-04-04 | End: 2020-04-05

## 2020-04-04 RX ADMIN — Medication 400 MILLIGRAM(S): at 11:07

## 2020-04-04 RX ADMIN — SODIUM CHLORIDE 75 MILLILITER(S): 9 INJECTION INTRAMUSCULAR; INTRAVENOUS; SUBCUTANEOUS at 16:35

## 2020-04-04 RX ADMIN — BORTEZOMIB 2.3 MILLIGRAM(S): 2.5 INJECTION INTRAVENOUS at 12:59

## 2020-04-04 RX ADMIN — Medication 1 TABLET(S): at 11:07

## 2020-04-04 RX ADMIN — ONDANSETRON 8 MILLIGRAM(S): 8 TABLET, FILM COATED ORAL at 12:40

## 2020-04-04 RX ADMIN — PANTOPRAZOLE SODIUM 40 MILLIGRAM(S): 20 TABLET, DELAYED RELEASE ORAL at 06:55

## 2020-04-04 RX ADMIN — HEPARIN SODIUM 5000 UNIT(S): 5000 INJECTION INTRAVENOUS; SUBCUTANEOUS at 22:01

## 2020-04-04 RX ADMIN — HEPARIN SODIUM 5000 UNIT(S): 5000 INJECTION INTRAVENOUS; SUBCUTANEOUS at 06:55

## 2020-04-04 RX ADMIN — HEPARIN SODIUM 5000 UNIT(S): 5000 INJECTION INTRAVENOUS; SUBCUTANEOUS at 14:37

## 2020-04-04 NOTE — PROGRESS NOTE ADULT - ASSESSMENT
Patient is a 58 y/o M w/ a PMHx of MM (not in treatment PTA) and HTN who presented with uncontrollable pain likely 2/2 MM. Hematology consulted for history of MM. Patient started C1 Blade/Dex on 4/1.    # History of IgG Kappa Multiple Myeloma  - Received 3 cycles RVD in 2018, followed by Pomalyst as patient had a severe skin reaction to Revlimid  - SPEP/UPEP show distinct gamma band (5.86 g/dL on SPEP). Check urine immunofixation and urine FLC (currently pending)  - Serum LATISHA confirming IgG kappa MM  - Serum FLC ratio 432  - IGG >8000  - B2 microglobulin 6.6  -   - MRIs showing widespread myelomatous changes, no large areas of epidural tumor extension. Multiple pathologic fractures most pronounced at T2 level. L4 compression fracture noted again as seen on CT Pelvis end of February.   - Rad Onc consulted who stated that they do not believe RT will help. Possibly can be done as outpt. Neurosurgery note stated that they defer to Rad onc (would not intervene at this time after discussion with patient).   - Hepatitis panel normal. HIV negative  - Skeletal survey (4/2): Scattered lytic lesions in the calvarium, spine, and pelvis consistent with multiple myeloma.  - BMBx performed on 3/31, confirming > 70% plasma cell neoplasm  - Patient is s/p C1D1 Velcade/Dexamethasone on 4/1. Plan for patient to receive C1D4 Velcade today (4/4). Confirmed order with chemo pharmacist  - C/w Acyclovir PPx    Plan d/w primary team    Kun Perla, PGY4  Hematology-Oncology Fellow  Pager: [phone] / [phone]

## 2020-04-04 NOTE — PROGRESS NOTE ADULT - SUBJECTIVE AND OBJECTIVE BOX
Patient is a 59y old  Male who presents with a chief complaint of CC: pain (03 Apr 2020 12:55)                  Vital Signs Last 24 Hrs  T(C): 36.9 (04 Apr 2020 06:52), Max: 37 (03 Apr 2020 14:22)  T(F): 98.4 (04 Apr 2020 06:52), Max: 98.6 (03 Apr 2020 14:22)  HR: 93 (04 Apr 2020 06:52) (80 - 93)  BP: 146/98 (04 Apr 2020 06:52) (126/89 - 146/98)  BP(mean): --  RR: 18 (04 Apr 2020 06:52) (18 - 18)  SpO2: 100% (04 Apr 2020 06:52) (100% - 100%)  Daily     Daily   I&O's Summary      Neck: no bruits, JVD, adenopathy  Chest: clear  Cor: [id number], RR, normal S1S2 with no mrght  Abd: soft, nontender, BS+ and no HSM  Ext: w/o CCE  Pulses:2+->dp bilaterally    MEDICATIONS  (STANDING):  acyclovir   Oral Tab/Cap 400 milliGRAM(s) Oral daily  bortezomib SUBCUTANEOUS (eMAR) 2.3 milliGRAM(s) SubCutaneous once  heparin  Injectable 5000 Unit(s) IV Push once  heparin  Injectable 5000 Unit(s) SubCutaneous every 8 hours  influenza   Vaccine 0.5 milliLiter(s) IntraMuscular once  lidocaine 2% (Preservative-free) Injectable 20 milliLiter(s) Local Injection once  multivitamin 1 Tablet(s) Oral daily  ondansetron Injectable 8 milliGRAM(s) IV Push once  pantoprazole    Tablet 40 milliGRAM(s) Oral before breakfast  senna 2 Tablet(s) Oral at bedtime  sodium chloride 0.9%. 1000 milliLiter(s) (100 mL/Hr) IV Continuous <Continuous>    MEDICATIONS  (PRN):  acetaminophen   Tablet .. 650 milliGRAM(s) Oral every 4 hours PRN Mild Pain (1 - 3)  magnesium hydroxide Suspension 30 milliLiter(s) Oral daily PRN Constipation  morphine  - Injectable 2 milliGRAM(s) IV Push every 2 hours PRN Severe Pain (7 - 10)  oxyCODONE    IR 5 milliGRAM(s) Oral every 4 hours PRN Moderate Pain (4 - 6)      04-04    125<L>  |  97<L>  |  19  ----------------------------<  99  4.3   |  18<L>  |  0.77    Ca    8.8      04 Apr 2020 05:00  Phos  2.0     04-04  Mg     1.9     04-04    TPro  12.5<H>  /  Alb  2.6<L>  /  TBili  0.7  /  DBili  x   /  AST  26  /  ALT  --  /  AlkPhos  55  04-04                          10.2   3.90  )-----------( 367      ( 04 Apr 2020 07:00 )             32.5         HEALTH ISSUES - PROBLEM Dx:  Back pain,Multiple myeloma: as per Dr. Carney  Essential hypertension: elevated currently but generally normal or only mildly elevated.  Continue to monitor  Hyponatremia: sodium continues to drop.  Will call renal consult

## 2020-04-04 NOTE — PROGRESS NOTE ADULT - SUBJECTIVE AND OBJECTIVE BOX
Patient is a 59y old  Male who presents with a chief complaint of CC: pain (04 Apr 2020 10:00)    SUBJECTIVE / OVERNIGHT EVENTS:  No acute events overnight. Patient seen and examined. He endorses continued R hip and R chest/rib pain which is overall stable. No complaints of L-sided chest pain, shortness of breath, vomiting, or abdominal pain. Patient was afebrile overnight. Patient is due for C1D4 Velcade today.    MEDICATIONS  (STANDING):  acyclovir   Oral Tab/Cap 400 milliGRAM(s) Oral daily  heparin  Injectable 5000 Unit(s) IV Push once  heparin  Injectable 5000 Unit(s) SubCutaneous every 8 hours  influenza   Vaccine 0.5 milliLiter(s) IntraMuscular once  lidocaine 2% (Preservative-free) Injectable 20 milliLiter(s) Local Injection once  multivitamin 1 Tablet(s) Oral daily  pantoprazole    Tablet 40 milliGRAM(s) Oral before breakfast  senna 2 Tablet(s) Oral at bedtime  sodium chloride 0.9%. 1000 milliLiter(s) (100 mL/Hr) IV Continuous <Continuous>    MEDICATIONS  (PRN):  acetaminophen   Tablet .. 650 milliGRAM(s) Oral every 4 hours PRN Mild Pain (1 - 3)  magnesium hydroxide Suspension 30 milliLiter(s) Oral daily PRN Constipation  morphine  - Injectable 2 milliGRAM(s) IV Push every 2 hours PRN Severe Pain (7 - 10)  oxyCODONE    IR 5 milliGRAM(s) Oral every 4 hours PRN Moderate Pain (4 - 6)        CAPILLARY BLOOD GLUCOSE        I&O's Summary    Vital Signs Last 24 Hrs  T(C): 36.9 (04 Apr 2020 06:52), Max: 37 (03 Apr 2020 14:22)  T(F): 98.4 (04 Apr 2020 06:52), Max: 98.6 (03 Apr 2020 14:22)  HR: 93 (04 Apr 2020 06:52) (80 - 93)  BP: 146/98 (04 Apr 2020 06:52) (126/89 - 146/98)  BP(mean): --  RR: 18 (04 Apr 2020 06:52) (18 - 18)  SpO2: 100% (04 Apr 2020 06:52) (100% - 100%)    PHYSICAL EXAM:  GENERAL: NAD, Laying in bed  HEENT: NC/AT, Slightly dry mucous membanes  NECK: Supple  CHEST/LUNG: Grossly CTAB; No wheeze appreciated  HEART: RRR; +S1/S2  ABDOMEN: +BS, Soft, NT, No rigidity  EXTREMITIES: No LE edema, + R hip TTP  NEUROLOGY: Awake and alert, Answering questions and following commands appropriately  SKIN: Warm and dry  PSYCH: Calm and cooperative    LABS:                        10.2   3.90  )-----------( 367      ( 04 Apr 2020 07:00 )             32.5     04-04    125<L>  |  97<L>  |  19  ----------------------------<  99  4.3   |  18<L>  |  0.77    Ca    8.8      04 Apr 2020 05:00  Phos  2.0     04-04  Mg     1.9     04-04    TPro  12.5<H>  /  Alb  2.6<L>  /  TBili  0.7  /  DBili  x   /  AST  26  /  ALT  --  /  AlkPhos  55  04-04      RADIOLOGY & ADDITIONAL TESTS:  Studies reviewed.

## 2020-04-05 LAB
ANION GAP SERPL CALC-SCNC: 7 MMO/L — SIGNIFICANT CHANGE UP (ref 7–14)
BUN SERPL-MCNC: 15 MG/DL — SIGNIFICANT CHANGE UP (ref 7–23)
CALCIUM SERPL-MCNC: 8.1 MG/DL — LOW (ref 8.4–10.5)
CHLORIDE SERPL-SCNC: 101 MMOL/L — SIGNIFICANT CHANGE UP (ref 98–107)
CO2 SERPL-SCNC: 20 MMOL/L — LOW (ref 22–31)
CREAT SERPL-MCNC: 0.84 MG/DL — SIGNIFICANT CHANGE UP (ref 0.5–1.3)
GLUCOSE SERPL-MCNC: 95 MG/DL — SIGNIFICANT CHANGE UP (ref 70–99)
HCT VFR BLD CALC: 28.4 % — LOW (ref 39–50)
HGB BLD-MCNC: 9 G/DL — LOW (ref 13–17)
MAGNESIUM SERPL-MCNC: 2 MG/DL — SIGNIFICANT CHANGE UP (ref 1.6–2.6)
MCHC RBC-ENTMCNC: 31 PG — SIGNIFICANT CHANGE UP (ref 27–34)
MCHC RBC-ENTMCNC: 31.7 % — LOW (ref 32–36)
MCV RBC AUTO: 97.9 FL — SIGNIFICANT CHANGE UP (ref 80–100)
NRBC # FLD: 0 K/UL — SIGNIFICANT CHANGE UP (ref 0–0)
PHOSPHATE SERPL-MCNC: 1.8 MG/DL — LOW (ref 2.5–4.5)
PLATELET # BLD AUTO: 341 K/UL — SIGNIFICANT CHANGE UP (ref 150–400)
PMV BLD: 9.2 FL — SIGNIFICANT CHANGE UP (ref 7–13)
POTASSIUM SERPL-MCNC: 4 MMOL/L — SIGNIFICANT CHANGE UP (ref 3.5–5.3)
POTASSIUM SERPL-SCNC: 4 MMOL/L — SIGNIFICANT CHANGE UP (ref 3.5–5.3)
RBC # BLD: 2.9 M/UL — LOW (ref 4.2–5.8)
RBC # FLD: 15.2 % — HIGH (ref 10.3–14.5)
SODIUM SERPL-SCNC: 128 MMOL/L — LOW (ref 135–145)
WBC # BLD: 5.36 K/UL — SIGNIFICANT CHANGE UP (ref 3.8–10.5)
WBC # FLD AUTO: 5.36 K/UL — SIGNIFICANT CHANGE UP (ref 3.8–10.5)

## 2020-04-05 PROCEDURE — 99233 SBSQ HOSP IP/OBS HIGH 50: CPT

## 2020-04-05 RX ORDER — SODIUM CHLORIDE 9 MG/ML
1000 INJECTION INTRAMUSCULAR; INTRAVENOUS; SUBCUTANEOUS
Refills: 0 | Status: DISCONTINUED | OUTPATIENT
Start: 2020-04-05 | End: 2020-04-09

## 2020-04-05 RX ADMIN — HEPARIN SODIUM 5000 UNIT(S): 5000 INJECTION INTRAVENOUS; SUBCUTANEOUS at 12:54

## 2020-04-05 RX ADMIN — Medication 400 MILLIGRAM(S): at 12:53

## 2020-04-05 RX ADMIN — PANTOPRAZOLE SODIUM 40 MILLIGRAM(S): 20 TABLET, DELAYED RELEASE ORAL at 05:38

## 2020-04-05 RX ADMIN — HEPARIN SODIUM 5000 UNIT(S): 5000 INJECTION INTRAVENOUS; SUBCUTANEOUS at 05:38

## 2020-04-05 RX ADMIN — SODIUM CHLORIDE 75 MILLILITER(S): 9 INJECTION INTRAMUSCULAR; INTRAVENOUS; SUBCUTANEOUS at 13:02

## 2020-04-05 RX ADMIN — HEPARIN SODIUM 5000 UNIT(S): 5000 INJECTION INTRAVENOUS; SUBCUTANEOUS at 12:52

## 2020-04-05 RX ADMIN — SODIUM CHLORIDE 75 MILLILITER(S): 9 INJECTION INTRAMUSCULAR; INTRAVENOUS; SUBCUTANEOUS at 01:00

## 2020-04-05 RX ADMIN — OXYCODONE HYDROCHLORIDE 5 MILLIGRAM(S): 5 TABLET ORAL at 13:17

## 2020-04-05 RX ADMIN — SENNA PLUS 2 TABLET(S): 8.6 TABLET ORAL at 21:22

## 2020-04-05 RX ADMIN — Medication 1 TABLET(S): at 12:52

## 2020-04-05 NOTE — CHART NOTE - NSCHARTNOTEFT_GEN_A_CORE
Source: Patient [ ]    Family [ ]     other [X] chart, nursing    Diet : Diet, DASH/TLC:   Sodium & Cholesterol Restricted (03-29-20 @ 00:18)    Patient is a 58 y/o male with PMH multiple myeloma (not in treatment) and HTN who presents for uncontrolled pain. Per nursing, patient continues to have poor appetite, likely related to + R hip and R chest pain. No GI distress (nausea/vomiting/diarrhea/constipation). No chewing/swallowing difficulties on foods/liquids. Previous RD recommendation (initial assessment on 3/30) on adding Ensure Enlive 240mls 2x daily (700kcal, 40g protein) remains appropriate. Patient may also benefit from liberalize diet by d/c the DASH (cholesterol & Na restricted) restriction to help optimize po intake.      Weight hx: 63.6kg (3/29/20).      Pertinent Medications: MEDICATIONS  (STANDING):  acyclovir   Oral Tab/Cap 400 milliGRAM(s) Oral daily  heparin  Injectable 5000 Unit(s) IV Push once  heparin  Injectable 5000 Unit(s) SubCutaneous every 8 hours  influenza   Vaccine 0.5 milliLiter(s) IntraMuscular once  lidocaine 2% (Preservative-free) Injectable 20 milliLiter(s) Local Injection once  multivitamin 1 Tablet(s) Oral daily  pantoprazole    Tablet 40 milliGRAM(s) Oral before breakfast  senna 2 Tablet(s) Oral at bedtime  sodium chloride 0.9%. 1000 milliLiter(s) (75 mL/Hr) IV Continuous <Continuous>  sodium chloride 0.9%. 1000 milliLiter(s) (75 mL/Hr) IV Continuous <Continuous>    MEDICATIONS  (PRN):  acetaminophen   Tablet .. 650 milliGRAM(s) Oral every 4 hours PRN Mild Pain (1 - 3)  magnesium hydroxide Suspension 30 milliLiter(s) Oral daily PRN Constipation  morphine  - Injectable 2 milliGRAM(s) IV Push every 2 hours PRN Severe Pain (7 - 10)  oxyCODONE    IR 5 milliGRAM(s) Oral every 4 hours PRN Moderate Pain (4 - 6)    Pertinent Labs:  04-05 Na128 mmol/L<L> Glu 95 mg/dL K+ 4.0 mmol/L Cr  0.84 mg/dL BUN 15 mg/dL 04-05 Phos 1.8 mg/dL<L> 04-04 Alb 2.6 g/dL<L>      Skin: intact.  Edema: no edema.    Estimated Needs:   [X] no change since previous assessment  [ ] recalculated:       Previous Nutrition Diagnosis:     [X] Inadequate Energy Intake [ ]Inadequate Oral Intake [ ] Excessive Energy Intake     [ ] Underweight [ ] Increased Nutrient Needs [ ] Overweight/Obesity     [ ] Altered GI Function [ ] Unintended Weight Loss [ ] Food & Nutrition Related Knowledge Deficit [ ] Malnutrition          Nutrition Diagnosis is [X] ongoing  [ ] resolved [ ] not applicable          Recommendation:  1. D/C DASH (cholesterol & Na restricted) diet restriction.  2. Add Ensure Enlive 240mls 2x daily (700kcal, 40g protein).   3. Monitor weights, labs, BM's, skin integrity, PO intake/tolerance.   4. Encourage po intake, assist with meals and menu selections, provide alternatives PRN.   5. RDN remains available.  -- RD contacted team, pending response.

## 2020-04-06 DIAGNOSIS — Z29.9 ENCOUNTER FOR PROPHYLACTIC MEASURES, UNSPECIFIED: ICD-10-CM

## 2020-04-06 DIAGNOSIS — E87.1 HYPO-OSMOLALITY AND HYPONATREMIA: ICD-10-CM

## 2020-04-06 DIAGNOSIS — Z02.9 ENCOUNTER FOR ADMINISTRATIVE EXAMINATIONS, UNSPECIFIED: ICD-10-CM

## 2020-04-06 DIAGNOSIS — I10 ESSENTIAL (PRIMARY) HYPERTENSION: ICD-10-CM

## 2020-04-06 LAB
ANION GAP SERPL CALC-SCNC: 7 MMO/L — SIGNIFICANT CHANGE UP (ref 7–14)
ANION GAP SERPL CALC-SCNC: 7 MMO/L — SIGNIFICANT CHANGE UP (ref 7–14)
BUN SERPL-MCNC: 15 MG/DL — SIGNIFICANT CHANGE UP (ref 7–23)
BUN SERPL-MCNC: 15 MG/DL — SIGNIFICANT CHANGE UP (ref 7–23)
CALCIUM SERPL-MCNC: 7.6 MG/DL — LOW (ref 8.4–10.5)
CALCIUM SERPL-MCNC: 7.6 MG/DL — LOW (ref 8.4–10.5)
CHLORIDE SERPL-SCNC: 104 MMOL/L — SIGNIFICANT CHANGE UP (ref 98–107)
CHLORIDE SERPL-SCNC: 104 MMOL/L — SIGNIFICANT CHANGE UP (ref 98–107)
CO2 SERPL-SCNC: 20 MMOL/L — LOW (ref 22–31)
CO2 SERPL-SCNC: 20 MMOL/L — LOW (ref 22–31)
CREAT SERPL-MCNC: 0.85 MG/DL — SIGNIFICANT CHANGE UP (ref 0.5–1.3)
CREAT SERPL-MCNC: 0.85 MG/DL — SIGNIFICANT CHANGE UP (ref 0.5–1.3)
GLUCOSE SERPL-MCNC: 107 MG/DL — HIGH (ref 70–99)
GLUCOSE SERPL-MCNC: 107 MG/DL — HIGH (ref 70–99)
HCT VFR BLD CALC: 26.5 % — LOW (ref 39–50)
HGB BLD-MCNC: 8.6 G/DL — LOW (ref 13–17)
MAGNESIUM SERPL-MCNC: 2 MG/DL — SIGNIFICANT CHANGE UP (ref 1.6–2.6)
MCHC RBC-ENTMCNC: 31.4 PG — SIGNIFICANT CHANGE UP (ref 27–34)
MCHC RBC-ENTMCNC: 32.5 % — SIGNIFICANT CHANGE UP (ref 32–36)
MCV RBC AUTO: 96.7 FL — SIGNIFICANT CHANGE UP (ref 80–100)
NRBC # FLD: 0 K/UL — SIGNIFICANT CHANGE UP (ref 0–0)
OSMOLALITY SERPL: 289 MOSMO/KG — SIGNIFICANT CHANGE UP (ref 275–295)
PHOSPHATE SERPL-MCNC: 1.3 MG/DL — LOW (ref 2.5–4.5)
PLATELET # BLD AUTO: 317 K/UL — SIGNIFICANT CHANGE UP (ref 150–400)
PMV BLD: 9.1 FL — SIGNIFICANT CHANGE UP (ref 7–13)
POTASSIUM SERPL-MCNC: 4 MMOL/L — SIGNIFICANT CHANGE UP (ref 3.5–5.3)
POTASSIUM SERPL-MCNC: 4 MMOL/L — SIGNIFICANT CHANGE UP (ref 3.5–5.3)
POTASSIUM SERPL-SCNC: 4 MMOL/L — SIGNIFICANT CHANGE UP (ref 3.5–5.3)
POTASSIUM SERPL-SCNC: 4 MMOL/L — SIGNIFICANT CHANGE UP (ref 3.5–5.3)
RBC # BLD: 2.74 M/UL — LOW (ref 4.2–5.8)
RBC # FLD: 15.4 % — HIGH (ref 10.3–14.5)
SODIUM SERPL-SCNC: 131 MMOL/L — LOW (ref 135–145)
SODIUM SERPL-SCNC: 131 MMOL/L — LOW (ref 135–145)
WBC # BLD: 3.99 K/UL — SIGNIFICANT CHANGE UP (ref 3.8–10.5)
WBC # FLD AUTO: 3.99 K/UL — SIGNIFICANT CHANGE UP (ref 3.8–10.5)

## 2020-04-06 PROCEDURE — 99233 SBSQ HOSP IP/OBS HIGH 50: CPT

## 2020-04-06 PROCEDURE — 99232 SBSQ HOSP IP/OBS MODERATE 35: CPT | Mod: GC

## 2020-04-06 RX ORDER — SODIUM,POTASSIUM PHOSPHATES 278-250MG
1 POWDER IN PACKET (EA) ORAL
Refills: 0 | Status: COMPLETED | OUTPATIENT
Start: 2020-04-06 | End: 2020-04-06

## 2020-04-06 RX ADMIN — HEPARIN SODIUM 5000 UNIT(S): 5000 INJECTION INTRAVENOUS; SUBCUTANEOUS at 15:19

## 2020-04-06 RX ADMIN — HEPARIN SODIUM 5000 UNIT(S): 5000 INJECTION INTRAVENOUS; SUBCUTANEOUS at 21:44

## 2020-04-06 RX ADMIN — SENNA PLUS 2 TABLET(S): 8.6 TABLET ORAL at 21:44

## 2020-04-06 RX ADMIN — Medication 1 PACKET(S): at 19:24

## 2020-04-06 RX ADMIN — Medication 400 MILLIGRAM(S): at 15:19

## 2020-04-06 RX ADMIN — Medication 1 TABLET(S): at 15:20

## 2020-04-06 RX ADMIN — PANTOPRAZOLE SODIUM 40 MILLIGRAM(S): 20 TABLET, DELAYED RELEASE ORAL at 02:20

## 2020-04-06 NOTE — PROGRESS NOTE ADULT - ASSESSMENT
Patient is a 60 y/o M w/ a PMHx of MM (not in treatment PTA) and HTN who presented with uncontrollable pain likely 2/2 MM. Hematology consulted for history of MM. Patient started C1 Blade/Dex on 4/1.    # History of IgG Kappa Multiple Myeloma  - Received 3 cycles RVD in 2018, followed by Pomalyst as patient had a severe skin reaction to Revlimid  - SPEP/UPEP show distinct gamma band (5.86 g/dL on SPEP)  - Serum LATISHA confirming IgG kappa MM  - Serum FLC ratio 432, IgG >8000, B2 microglobulin 6.6,   - MRIs showing widespread myelomatous changes, no large areas of epidural tumor extension. Multiple pathologic fractures most pronounced at T2 level. L4 compression fracture noted again as seen on CT Pelvis end of February.   - Rad Onc consulted who stated that they do not believe RT will help. Possibly can be done as outpt. Neurosurgery note stated that they defer to Rad onc (would not intervene at this time after discussion with patient).   - BMBx performed on 3/31, confirming > 70% plasma cell neoplasm  - Patient is s/p C1D1 Velcade/Dexamethasone on 4/1. C1D4 Velcade 4/4.  - plan for C1D8 velcade/dexamethasone on Wednesday   - C/w Acyclovir PPx\  - continue aggressive pain management, uptitrate meds as needed. Palliative following     Plan d/w primary team    Farhat Trejo, PGY-5  Hematology-Oncology Fellow  [phone] ([place]) [phone] ([facility]) Patient is a 58 y/o M w/ a PMHx of MM (not in treatment PTA) and HTN who presented with uncontrollable pain likely 2/2 MM. Hematology consulted for history of MM. Patient started C1 Blade/Dex on 4/1.    # History of IgG Kappa Multiple Myeloma  - Received 3 cycles RVD in 2018, followed by Pomalyst as patient had a severe skin reaction to Revlimid  - SPEP/UPEP show distinct gamma band (5.86 g/dL on SPEP)  - Serum LATISHA confirming IgG kappa MM  - Serum FLC ratio 432, IgG >8000, B2 microglobulin 6.6,   - MRIs showing widespread myelomatous changes, no large areas of epidural tumor extension. Multiple pathologic fractures most pronounced at T2 level. L4 compression fracture noted again as seen on CT Pelvis end of February.   - Rad Onc consulted who stated that they do not believe RT will help. Possibly can be done as outpt. Neurosurgery note stated that they defer to Rad onc (would not intervene at this time after discussion with patient).   - BMBx performed on 3/31, confirming > 70% plasma cell neoplasm  - Patient is s/p C1D1 Velcade/Dexamethasone on 4/1. C1D4 Velcade 4/4.  - plan for C1D8 velcade/dexamethasone on Wednesday   - C/w Acyclovir PPx  - continue aggressive pain management, uptitrate meds as needed. Palliative following     Plan d/w primary team    Farhat Trejo, PGY-5  Hematology-Oncology Fellow  [phone] ([place]) [phone] ([facility])

## 2020-04-06 NOTE — PROGRESS NOTE ADULT - SUBJECTIVE AND OBJECTIVE BOX
INTERVAL HPI/OVERNIGHT EVENTS:  Continues to have right sided hip and rib pain. Medication is helping. Otherwise eating well. No BM since Saturday, but feels it is coming. Afebrile, no chills.     MEDICATIONS  (STANDING):  acyclovir   Oral Tab/Cap 400 milliGRAM(s) Oral daily  heparin  Injectable 5000 Unit(s) SubCutaneous every 8 hours  influenza   Vaccine 0.5 milliLiter(s) IntraMuscular once  lidocaine 2% (Preservative-free) Injectable 20 milliLiter(s) Local Injection once  multivitamin 1 Tablet(s) Oral daily  pantoprazole    Tablet 40 milliGRAM(s) Oral before breakfast  potassium phosphate / sodium phosphate powder 1 Packet(s) Oral three times a day with meals  senna 2 Tablet(s) Oral at bedtime  sodium chloride 0.9%. 1000 milliLiter(s) (75 mL/Hr) IV Continuous <Continuous>    MEDICATIONS  (PRN):  acetaminophen   Tablet .. 650 milliGRAM(s) Oral every 4 hours PRN Mild Pain (1 - 3)  magnesium hydroxide Suspension 30 milliLiter(s) Oral daily PRN Constipation  morphine  - Injectable 2 milliGRAM(s) IV Push every 2 hours PRN Severe Pain (7 - 10)  oxyCODONE    IR 5 milliGRAM(s) Oral every 4 hours PRN Moderate Pain (4 - 6)    Allergies    No Known Allergies    Intolerances          VITAL SIGNS:  T(F): 98.6 (04-06-20 @ 02:25)  HR: 79 (04-06-20 @ 02:25)  BP: 114/66 (04-06-20 @ 02:25)  RR: 17 (04-06-20 @ 02:25)  SpO2: 99% (04-06-20 @ 02:25)  Wt(kg): --    PHYSICAL EXAM:    Constitutional: NAD, lying comfortably in bed  Eyes: EOMI, PERRLA  Neck: supple, no masses, no JVD  Respiratory: CTAB; no r/r/w  Cardiovascular: RRR, no M/R/G  Gastrointestinal: +BS, soft, NTND, no hepatosplenomegaly  Extremities: rt hip tender to palpation   Neurological: AAOx3, nonfocal    LABS:                        8.6    3.99  )-----------( 317      ( 06 Apr 2020 02:05 )             26.5     04-06    131<L>  |  104  |  15  ----------------------------<  107<H>  4.0   |  20<L>  |  0.85    Ca    7.6<L>      06 Apr 2020 02:05  Phos  1.3     04-06  Mg     2.0     04-06            RADIOLOGY & ADDITIONAL TESTS:  Studies reviewed. INTERVAL HPI/OVERNIGHT EVENTS:  Continues to have right sided hip and rib pain. Medication is helping. Otherwise eating well. No BM since Saturday, but feels it is coming. Afebrile, no chills.     MEDICATIONS  (STANDING):  acyclovir   Oral Tab/Cap 400 milliGRAM(s) Oral daily  heparin  Injectable 5000 Unit(s) SubCutaneous every 8 hours  influenza   Vaccine 0.5 milliLiter(s) IntraMuscular once  lidocaine 2% (Preservative-free) Injectable 20 milliLiter(s) Local Injection once  multivitamin 1 Tablet(s) Oral daily  pantoprazole    Tablet 40 milliGRAM(s) Oral before breakfast  potassium phosphate / sodium phosphate powder 1 Packet(s) Oral three times a day with meals  senna 2 Tablet(s) Oral at bedtime  sodium chloride 0.9%. 1000 milliLiter(s) (75 mL/Hr) IV Continuous <Continuous>    MEDICATIONS  (PRN):  acetaminophen   Tablet .. 650 milliGRAM(s) Oral every 4 hours PRN Mild Pain (1 - 3)  magnesium hydroxide Suspension 30 milliLiter(s) Oral daily PRN Constipation  morphine  - Injectable 2 milliGRAM(s) IV Push every 2 hours PRN Severe Pain (7 - 10)  oxyCODONE    IR 5 milliGRAM(s) Oral every 4 hours PRN Moderate Pain (4 - 6)    Allergies    No Known Allergies    Intolerances          VITAL SIGNS:  T(F): 98.6 (04-06-20 @ 02:25)  HR: 79 (04-06-20 @ 02:25)  BP: 114/66 (04-06-20 @ 02:25)  RR: 17 (04-06-20 @ 02:25)  SpO2: 99% (04-06-20 @ 02:25)  Wt(kg): --    PHYSICAL EXAM:  Deferred due to COVID19    LABS:                        8.6    3.99  )-----------( 317      ( 06 Apr 2020 02:05 )             26.5     04-06    131<L>  |  104  |  15  ----------------------------<  107<H>  4.0   |  20<L>  |  0.85    Ca    7.6<L>      06 Apr 2020 02:05  Phos  1.3     04-06  Mg     2.0     04-06            RADIOLOGY & ADDITIONAL TESTS:  Studies reviewed.

## 2020-04-06 NOTE — PROGRESS NOTE ADULT - PROBLEM SELECTOR PLAN 5
Diet: regular with Ensure Enlive  Bowel regimen: senna qhs, Mg(OH)2 as needed  DVT ppx: HSQ  Dispo: pending hem/onc plan for continued tx

## 2020-04-06 NOTE — PROGRESS NOTE ADULT - SUBJECTIVE AND OBJECTIVE BOX
Helen Alcala M.D.  Beeper: [phone] ([facility])/ [phone] ([facility])     INTERNAL MEDICINE PROGRESS NOTE    Patient is a 59y old  Male who presents with a chief complaint of CC: pain (04 Apr 2020 14:11)    Overnight events: none reported  Subjective:    Medications:  MEDICATIONS  (STANDING):  acyclovir   Oral Tab/Cap 400 milliGRAM(s) Oral daily  heparin  Injectable 5000 Unit(s) SubCutaneous every 8 hours  influenza   Vaccine 0.5 milliLiter(s) IntraMuscular once  lidocaine 2% (Preservative-free) Injectable 20 milliLiter(s) Local Injection once  multivitamin 1 Tablet(s) Oral daily  pantoprazole    Tablet 40 milliGRAM(s) Oral before breakfast  potassium phosphate / sodium phosphate powder 1 Packet(s) Oral three times a day with meals  senna 2 Tablet(s) Oral at bedtime  sodium chloride 0.9%. 1000 milliLiter(s) (75 mL/Hr) IV Continuous <Continuous>    MEDICATIONS  (PRN):  acetaminophen   Tablet .. 650 milliGRAM(s) Oral every 4 hours PRN Mild Pain (1 - 3)  magnesium hydroxide Suspension 30 milliLiter(s) Oral daily PRN Constipation  morphine  - Injectable 2 milliGRAM(s) IV Push every 2 hours PRN Severe Pain (7 - 10)  oxyCODONE    IR 5 milliGRAM(s) Oral every 4 hours PRN Moderate Pain (4 - 6)    Objective:    Vitals: Vital Signs Last 24 Hrs  T(C): 37 (04-06-20 @ 02:25), Max: 37.1 (04-05-20 @ 13:12)  T(F): 98.6 (04-06-20 @ 02:25), Max: 98.8 (04-05-20 @ 13:12)  HR: 79 (04-06-20 @ 02:25) (79 - 91)  BP: 114/66 (04-06-20 @ 02:25) (114/66 - 116/88)  BP(mean): --  RR: 17 (04-06-20 @ 02:25) (16 - 17)  SpO2: 99% (04-06-20 @ 02:25) (98% - 99%)                I&O's Summary  05 Apr 2020 07:01  -  06 Apr 2020 07:00  --------------------------------------------------------  IN: 700 mL / OUT: 950 mL / NET: -250 mL    PHYSICAL EXAM:  GENERAL: NAD, conversant  CHEST/LUNG: Clear to auscultation bilaterally; No crackles, rhonchi, wheezing, or rubs  HEART: Regular rate and rhythm; No murmurs, rubs, or gallops  ABDOMEN: Soft, Nontender, Nondistended; Bowel sounds present  EXTREMITIES:  2+ Peripheral Pulses, No clubbing, cyanosis, or edema  SKIN: No rashes or lesions  NERVOUS SYSTEM:  Alert & Oriented, Good concentration                                                                                  LABS:  04-06    131<L>  |  104  |  15  ----------------------------<  107<H>  4.0   |  20<L>  |  0.85  04-05    128<L>  |  101  |  15  ----------------------------<  95  4.0   |  20<L>  |  0.84  04-04    128<L>  |  100  |  19  ----------------------------<  95  3.5   |  19<L>  |  0.92    Ca    7.6<L>      06 Apr 2020 02:05  Ca    8.1<L>      05 Apr 2020 06:00  Ca    8.3<L>      04 Apr 2020 22:55  Phos  1.3     04-06  Mg     2.0     04-06    TPro  12.5<H>  /  Alb  2.6<L>  /  TBili  0.7  /  DBili  x   /  AST  26  /  ALT  --  /  AlkPhos  55  04-04                        8.6    3.99  )-----------( 317      ( 06 Apr 2020 02:05 )             26.5                         9.0    5.36  )-----------( 341      ( 05 Apr 2020 06:00 )             28.4                         10.2   3.90  )-----------( 367      ( 04 Apr 2020 07:00 )             32.5     CAPILLARY BLOOD GLUCOSE    RECENT CULTURES:  04-02 @ 12:30  .Stool Feces  --  --  --    No Protozoa seen by trichrome stain  No Helminths or Protozoa seen in formalin concentrate  performed by iodine stain  (routine O+P not evaluated for Microsporidia,  Cryptosporidia, Cyclospora, or Isospora.)  One negative sample does not necessarily rule  out the presence of a parasitic infection.  --     RADIOLOGY & ADDITIONAL TESTS: N  Consultants involved in case: [facility] Helen Alcala M.D.  Beeper: [phone] ([facility])/ [phone] ([facility])     INTERNAL MEDICINE PROGRESS NOTE    Patient is a 59y old  Male who presents with a chief complaint of CC: pain (04 Apr 2020 14:11)    Overnight events: none reported  Subjective: c/o back pain, more lower back, also rib pain, 4-5/10 pain. Oxycodone helping. No other complaints. Still pain limiting ambulation.    Medications:  MEDICATIONS  (STANDING):  acyclovir   Oral Tab/Cap 400 milliGRAM(s) Oral daily  heparin  Injectable 5000 Unit(s) SubCutaneous every 8 hours  influenza   Vaccine 0.5 milliLiter(s) IntraMuscular once  lidocaine 2% (Preservative-free) Injectable 20 milliLiter(s) Local Injection once  multivitamin 1 Tablet(s) Oral daily  pantoprazole    Tablet 40 milliGRAM(s) Oral before breakfast  potassium phosphate / sodium phosphate powder 1 Packet(s) Oral three times a day with meals  senna 2 Tablet(s) Oral at bedtime  sodium chloride 0.9%. 1000 milliLiter(s) (75 mL/Hr) IV Continuous <Continuous>    MEDICATIONS  (PRN):  acetaminophen   Tablet .. 650 milliGRAM(s) Oral every 4 hours PRN Mild Pain (1 - 3)  magnesium hydroxide Suspension 30 milliLiter(s) Oral daily PRN Constipation  morphine  - Injectable 2 milliGRAM(s) IV Push every 2 hours PRN Severe Pain (7 - 10)  oxyCODONE    IR 5 milliGRAM(s) Oral every 4 hours PRN Moderate Pain (4 - 6)    Objective:    Vitals: Vital Signs Last 24 Hrs  T(C): 37 (04-06-20 @ 02:25), Max: 37.1 (04-05-20 @ 13:12)  T(F): 98.6 (04-06-20 @ 02:25), Max: 98.8 (04-05-20 @ 13:12)  HR: 79 (04-06-20 @ 02:25) (79 - 91)  BP: 114/66 (04-06-20 @ 02:25) (114/66 - 116/88)  BP(mean): --  RR: 17 (04-06-20 @ 02:25) (16 - 17)  SpO2: 99% (04-06-20 @ 02:25) (98% - 99%)                I&O's Summary  05 Apr 2020 07:01  -  06 Apr 2020 07:00  --------------------------------------------------------  IN: 700 mL / OUT: 950 mL / NET: -250 mL    PHYSICAL EXAM:  GENERAL: NAD, conversant  CHEST/LUNG: Clear to auscultation bilaterally ant lung exam; No crackles, rhonchi, wheezing, or rubs.  HEART: Regular rate and rhythm; No murmurs, rubs, or gallops  EXTREMITIES:  No edema  NERVOUS SYSTEM:  Alert & Oriented, Good concentration                                                                                  LABS:  04-06    131<L>  |  104  |  15  ----------------------------<  107<H>  4.0   |  20<L>  |  0.85  04-05    128<L>  |  101  |  15  ----------------------------<  95  4.0   |  20<L>  |  0.84  04-04    128<L>  |  100  |  19  ----------------------------<  95  3.5   |  19<L>  |  0.92    Ca    7.6<L>      06 Apr 2020 02:05  Ca    8.1<L>      05 Apr 2020 06:00  Ca    8.3<L>      04 Apr 2020 22:55  Phos  1.3     04-06  Mg     2.0     04-06    TPro  12.5<H>  /  Alb  2.6<L>  /  TBili  0.7  /  DBili  x   /  AST  26  /  ALT  --  /  AlkPhos  55  04-04                        8.6    3.99  )-----------( 317      ( 06 Apr 2020 02:05 )             26.5                         9.0    5.36  )-----------( 341      ( 05 Apr 2020 06:00 )             28.4                         10.2   3.90  )-----------( 367      ( 04 Apr 2020 07:00 )             32.5     CAPILLARY BLOOD GLUCOSE    RECENT CULTURES:  04-02 @ 12:30  .Stool Feces  --  --  --    No Protozoa seen by trichrome stain  No Helminths or Protozoa seen in formalin concentrate  performed by iodine stain  (routine O+P not evaluated for Microsporidia,  Cryptosporidia, Cyclospora, or Isospora.)  One negative sample does not necessarily rule  out the presence of a parasitic infection.  --     RADIOLOGY & ADDITIONAL TESTS: N  Consultants involved in case: [facility]

## 2020-04-06 NOTE — PROGRESS NOTE ADULT - ASSESSMENT
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 Also with hyponatremia - unclear etiology, acquire labs for FeNa and SOsm. 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 Also with hyponatremia - unclear etiology, acquire labs for FeNa and SOsm.    Pending pain control for discharge with hem/onc f/u/ Plan for bortezomib + dexamethasone on Wed (next doses). 58 yo M with hx multiple myeloma (untreated prior to admission, hx of tx in 2018) and HTN who p/w uncontrollable pain (back, hx vertebral compression fractures) limiting ambulation, admitted 3/29/2020 for pain control (oxycodone), MM treatment (hem/onc following, s/p BM bx 3/31 plasma cell neoplasm, received C1D1 velcade/dexamethasone per hem/onc), remains admitted for continued MM treatment and pain control. Also with hyponatremia - isoosmolar hypoNa with high serum protein prior - hyponatremia likely due to hyperparaproteinemia.    Pending pain control for discharge with hem/onc f/u/ Plan for bortezomib + dexamethasone on Wed (next doses). 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 Also with hyponatremia - isoosmolar hypoNa with high serum protein prior - pseudohyponatremia likely due to hyperparaproteinemia.    Pending pain control for discharge with hem/onc f/u/ Plan for bortezomib + dexamethasone on Wed (next doses).

## 2020-04-06 NOTE — PROGRESS NOTE ADULT - PROBLEM SELECTOR PLAN 1
#Hx MM, last treated 2018, now p/w severe pain, hx vertebral compression fracture, BM bx 3/31 plasma cell neoplasm, s/p bortezomib (Velcade) and dexamethasone 4/1, pending further tx per hem-onc c/s. #Hx MM, last treated 2018, now p/w severe pain, hx vertebral compression fracture, BM bx 3/31 plasma cell neoplasm, s/p bortezomib (Velcade) and dexamethasone 4/1, per onc: plan for C1D8 velcade/dexamethasone on 4/8

## 2020-04-06 NOTE — PROGRESS NOTE ADULT - PROBLEM SELECTOR PLAN 4
- Fluctuating, coni 125, now stabilizing.  - Obtain w/u with urine studies for FeNa and SOsm - Fluctuating, coni 125, now stabilizing.  - Obtain w/u with urine studies for FeNa and SOsm --> SOsm 289, normal, serum protein high 12.5, likely hyponatremia due to hyperparaproteinemia. - Fluctuating, coni 125, now stabilizing.  - Obtain w/u with urine studies for FeNa and SOsm --> SOsm 289, normal, serum protein high 12.5, likely pseudohyponatremia due to hyperparaproteinemia.

## 2020-04-06 NOTE — CHART NOTE - NSCHARTNOTEFT_GEN_A_CORE
At this time goals are established  Symptoms well controlled  Would continue with oxycodone 5mg PO q4hrs PRN can continue on discharge  Will sign off, reconsult as needed

## 2020-04-06 NOTE — PROGRESS NOTE ADULT - ATTENDING COMMENTS
58 yo male with relapsed MM, now on VD Cycle 1 Day 6. Discussed that additional agents could be added in as an outpatient to improve depth of response (unclear timing of last dose of pomalidomide). Discussed multiple therapeutic options, would hold off on adding cytoxan in to prevent neutropenia. Will monitor pain scale. Next due for VD on 4/8.

## 2020-04-06 NOTE — PROGRESS NOTE ADULT - PROBLEM SELECTOR PLAN 2
# P/w uncontrollable pain, could not walk, hx vertebral compression fractures  - C/w pain regimen by scale, Tylenol --> Oxycodone 5 q4h --> Morphine breakthrough severe # P/w uncontrollable pain, could not walk, hx vertebral compression fractures  - C/w pain regimen by scale, Tylenol --> Oxycodone 5 q4h --> Morphine breakthrough severe. used oxy x 1/24 hours. Observed patient ambulating, able to ambulate, feels discomfort (like bones rubbing together) when walking, but states it is not pain, and oxy does not help with that component of pain during weight-bearing. May need to consider other modalities (re: RT) in future

## 2020-04-06 NOTE — PROGRESS NOTE ADULT - PROBLEM SELECTOR PLAN 6
Transitions of Care Status:  1.  Name of PCP: none  2.  PCP Contacted on Admission: [ ] Y    [ x] N    3.  PCP contacted at Discharge: [ ] Y    [ ] N    [ ] N/A  4.  Post-Discharge Appointment Date and Location:  5.  Summary of Handoff given to PCP:

## 2020-04-06 NOTE — PROGRESS NOTE ADULT - PROBLEM SELECTOR PLAN 3
# not on any home meds for HTN  BP well controlled here off meds # not on any home meds for HTN  BP well controlled here off of meds

## 2020-04-07 LAB
ANION GAP SERPL CALC-SCNC: 8 MMO/L — SIGNIFICANT CHANGE UP (ref 7–14)
BUN SERPL-MCNC: 14 MG/DL — SIGNIFICANT CHANGE UP (ref 7–23)
CALCIUM SERPL-MCNC: 7.6 MG/DL — LOW (ref 8.4–10.5)
CHLORIDE SERPL-SCNC: 103 MMOL/L — SIGNIFICANT CHANGE UP (ref 98–107)
CHLORIDE UR-SCNC: 121 MMOL/L — SIGNIFICANT CHANGE UP
CO2 SERPL-SCNC: 14 MMOL/L — LOW (ref 22–31)
CREAT ?TM UR-MCNC: 62.3 MG/DL — SIGNIFICANT CHANGE UP
CREAT SERPL-MCNC: 0.78 MG/DL — SIGNIFICANT CHANGE UP (ref 0.5–1.3)
GLUCOSE SERPL-MCNC: 94 MG/DL — SIGNIFICANT CHANGE UP (ref 70–99)
HCT VFR BLD CALC: 30.3 % — LOW (ref 39–50)
HGB BLD-MCNC: 9.7 G/DL — LOW (ref 13–17)
MAGNESIUM SERPL-MCNC: 2.1 MG/DL — SIGNIFICANT CHANGE UP (ref 1.6–2.6)
MCHC RBC-ENTMCNC: 31.8 PG — SIGNIFICANT CHANGE UP (ref 27–34)
MCHC RBC-ENTMCNC: 32 % — SIGNIFICANT CHANGE UP (ref 32–36)
MCV RBC AUTO: 99.3 FL — SIGNIFICANT CHANGE UP (ref 80–100)
NRBC # FLD: 0 K/UL — SIGNIFICANT CHANGE UP (ref 0–0)
PHOSPHATE SERPL-MCNC: 1.3 MG/DL — LOW (ref 2.5–4.5)
PLATELET # BLD AUTO: 303 K/UL — SIGNIFICANT CHANGE UP (ref 150–400)
PMV BLD: 9.5 FL — SIGNIFICANT CHANGE UP (ref 7–13)
POTASSIUM SERPL-MCNC: 4.1 MMOL/L — SIGNIFICANT CHANGE UP (ref 3.5–5.3)
POTASSIUM SERPL-SCNC: 4.1 MMOL/L — SIGNIFICANT CHANGE UP (ref 3.5–5.3)
POTASSIUM UR-SCNC: 36.8 MMOL/L — SIGNIFICANT CHANGE UP
RBC # BLD: 3.05 M/UL — LOW (ref 4.2–5.8)
RBC # FLD: 15.9 % — HIGH (ref 10.3–14.5)
SODIUM SERPL-SCNC: 125 MMOL/L — LOW (ref 135–145)
SODIUM UR-SCNC: 125 MMOL/L — SIGNIFICANT CHANGE UP
WBC # BLD: 4.39 K/UL — SIGNIFICANT CHANGE UP (ref 3.8–10.5)
WBC # FLD AUTO: 4.39 K/UL — SIGNIFICANT CHANGE UP (ref 3.8–10.5)

## 2020-04-07 RX ORDER — BORTEZOMIB 2.5 MG/1
2.3 INJECTION INTRAVENOUS ONCE
Refills: 0 | Status: COMPLETED | OUTPATIENT
Start: 2020-04-08 | End: 2020-04-08

## 2020-04-07 RX ORDER — DEXAMETHASONE 0.5 MG/5ML
40 ELIXIR ORAL ONCE
Refills: 0 | Status: COMPLETED | OUTPATIENT
Start: 2020-04-08 | End: 2020-04-08

## 2020-04-07 RX ORDER — ONDANSETRON 8 MG/1
8 TABLET, FILM COATED ORAL ONCE
Refills: 0 | Status: COMPLETED | OUTPATIENT
Start: 2020-04-08 | End: 2020-04-08

## 2020-04-07 RX ADMIN — PANTOPRAZOLE SODIUM 40 MILLIGRAM(S): 20 TABLET, DELAYED RELEASE ORAL at 05:08

## 2020-04-07 RX ADMIN — HEPARIN SODIUM 5000 UNIT(S): 5000 INJECTION INTRAVENOUS; SUBCUTANEOUS at 15:18

## 2020-04-07 RX ADMIN — Medication 400 MILLIGRAM(S): at 12:28

## 2020-04-07 RX ADMIN — SODIUM CHLORIDE 75 MILLILITER(S): 9 INJECTION INTRAMUSCULAR; INTRAVENOUS; SUBCUTANEOUS at 06:50

## 2020-04-07 RX ADMIN — Medication 1 TABLET(S): at 12:28

## 2020-04-07 RX ADMIN — HEPARIN SODIUM 5000 UNIT(S): 5000 INJECTION INTRAVENOUS; SUBCUTANEOUS at 22:01

## 2020-04-07 RX ADMIN — HEPARIN SODIUM 5000 UNIT(S): 5000 INJECTION INTRAVENOUS; SUBCUTANEOUS at 05:08

## 2020-04-07 RX ADMIN — OXYCODONE HYDROCHLORIDE 5 MILLIGRAM(S): 5 TABLET ORAL at 22:01

## 2020-04-07 RX ADMIN — Medication 85 MILLIMOLE(S): at 12:28

## 2020-04-07 NOTE — PROGRESS NOTE ADULT - SUBJECTIVE AND OBJECTIVE BOX
Helen Alcala M.D.  Beeper: [phone] ([facility])/ [phone] ([facility])     INTERNAL MEDICINE PROGRESS NOTE    Patient is a 59y old  Male who presents with a chief complaint of CC: pain (06 Apr 2020 13:50)      Overnight events:  Subjective:    Medications:  MEDICATIONS  (STANDING):  acyclovir   Oral Tab/Cap 400 milliGRAM(s) Oral daily  heparin  Injectable 5000 Unit(s) SubCutaneous every 8 hours  influenza   Vaccine 0.5 milliLiter(s) IntraMuscular once  lidocaine 2% (Preservative-free) Injectable 20 milliLiter(s) Local Injection once  multivitamin 1 Tablet(s) Oral daily  pantoprazole    Tablet 40 milliGRAM(s) Oral before breakfast  senna 2 Tablet(s) Oral at bedtime  sodium chloride 0.9%. 1000 milliLiter(s) (75 mL/Hr) IV Continuous <Continuous>  sodium phosphate IVPB 30 milliMole(s) IV Intermittent once    MEDICATIONS  (PRN):  acetaminophen   Tablet .. 650 milliGRAM(s) Oral every 4 hours PRN Mild Pain (1 - 3)  magnesium hydroxide Suspension 30 milliLiter(s) Oral daily PRN Constipation  morphine  - Injectable 2 milliGRAM(s) IV Push every 2 hours PRN Severe Pain (7 - 10)  oxyCODONE    IR 5 milliGRAM(s) Oral every 4 hours PRN Moderate Pain (4 - 6)      Objective:    Vitals: Vital Signs Last 24 Hrs  T(C): 37.2 (04-06-20 @ 22:30), Max: 37.2 (04-06-20 @ 22:30)  T(F): 99 (04-06-20 @ 22:30), Max: 99 (04-06-20 @ 22:30)  HR: 88 (04-06-20 @ 22:30) (84 - 88)  BP: 135/88 (04-06-20 @ 22:30) (118/75 - 135/88)  BP(mean): --  RR: 18 (04-06-20 @ 22:30) (15 - 18)  SpO2: 100% (04-06-20 @ 22:30) (100% - 100%)              I&O's Summary      PHYSICAL EXAM:  GENERAL: NAD, conversant  CHEST/LUNG: Clear to auscultation bilaterally; No crackles, rhonchi, wheezing, or rubs  HEART: Regular rate and rhythm; No murmurs, rubs, or gallops  ABDOMEN: Soft, Nontender, Nondistended; Bowel sounds present  EXTREMITIES:  2+ Peripheral Pulses, No clubbing, cyanosis, or edema  SKIN: No rashes or lesions  NERVOUS SYSTEM:  Alert & Oriented, Good concentration                                                                                    LABS:  04-07    125<L>  |  103  |  14  ----------------------------<  94  4.1   |  14<L>  |  0.78  04-06    131<L>  |  104  |  15  ----------------------------<  107<H>  4.0   |  20<L>  |  0.85  04-05    128<L>  |  101  |  15  ----------------------------<  95  4.0   |  20<L>  |  0.84    Ca    7.6<L>      07 Apr 2020 03:45  Ca    7.6<L>      06 Apr 2020 02:05  Ca    8.1<L>      05 Apr 2020 06:00  Phos  1.3     04-07  Mg     2.1     04-07                                                      9.7    4.39  )-----------( 303      ( 07 Apr 2020 03:45 )             30.3                         8.6    3.99  )-----------( 317      ( 06 Apr 2020 02:05 )             26.5                         9.0    5.36  )-----------( 341      ( 05 Apr 2020 06:00 )             28.4     CAPILLARY BLOOD GLUCOSE            RECENT CULTURES:  04-02 @ 12:30  .Stool Feces  --  --  --    No Protozoa seen by trichrome stain  No Helminths or Protozoa seen in formalin concentrate  performed by iodine stain  (routine O+P not evaluated for Microsporidia,  Cryptosporidia, Cyclospora, or Isospora.)  One negative sample does not necessarily rule  out the presence of a parasitic infection.  --     RADIOLOGY & ADDITIONAL TESTS: N  Consultants involved in case: N Helen Alcala M.D.  Beeper: [phone] ([facility])/ [phone] ([facility])     INTERNAL MEDICINE PROGRESS NOTE    Patient is a 59y old  Male who presents with a chief complaint of CC: pain (06 Apr 2020 13:50)    Overnight events: no events  Subjective: still back pain, now a little worse 6/10 from 4-5/10. no other complaints.    Medications:  MEDICATIONS  (STANDING):  acyclovir   Oral Tab/Cap 400 milliGRAM(s) Oral daily  heparin  Injectable 5000 Unit(s) SubCutaneous every 8 hours  influenza   Vaccine 0.5 milliLiter(s) IntraMuscular once  lidocaine 2% (Preservative-free) Injectable 20 milliLiter(s) Local Injection once  multivitamin 1 Tablet(s) Oral daily  pantoprazole    Tablet 40 milliGRAM(s) Oral before breakfast  senna 2 Tablet(s) Oral at bedtime  sodium chloride 0.9%. 1000 milliLiter(s) (75 mL/Hr) IV Continuous <Continuous>  sodium phosphate IVPB 30 milliMole(s) IV Intermittent once    MEDICATIONS  (PRN):  acetaminophen   Tablet .. 650 milliGRAM(s) Oral every 4 hours PRN Mild Pain (1 - 3)  magnesium hydroxide Suspension 30 milliLiter(s) Oral daily PRN Constipation  morphine  - Injectable 2 milliGRAM(s) IV Push every 2 hours PRN Severe Pain (7 - 10)  oxyCODONE    IR 5 milliGRAM(s) Oral every 4 hours PRN Moderate Pain (4 - 6)      Objective:    Vitals: Vital Signs Last 24 Hrs  T(C): 37.2 (04-06-20 @ 22:30), Max: 37.2 (04-06-20 @ 22:30)  T(F): 99 (04-06-20 @ 22:30), Max: 99 (04-06-20 @ 22:30)  HR: 88 (04-06-20 @ 22:30) (84 - 88)  BP: 135/88 (04-06-20 @ 22:30) (118/75 - 135/88)  BP(mean): --  RR: 18 (04-06-20 @ 22:30) (15 - 18)  SpO2: 100% (04-06-20 @ 22:30) (100% - 100%)                PHYSICAL EXAM:  GENERAL: NAD, conversant  Observed to be walking independently without obvious gait instability   CHEST/LUNG: Clear to auscultation bilaterally ant lung; No crackles, rhonchi, wheezing, or rubs  HEART: Regular rate and rhythm; No murmurs, rubs, or gallops  EXTREMITIES:  No edema  NERVOUS SYSTEM:  Alert & Oriented, Good concentration                                                                                  LABS:  04-07    125<L>  |  103  |  14  ----------------------------<  94  4.1   |  14<L>  |  0.78  04-06    131<L>  |  104  |  15  ----------------------------<  107<H>  4.0   |  20<L>  |  0.85  04-05    128<L>  |  101  |  15  ----------------------------<  95  4.0   |  20<L>  |  0.84    Ca    7.6<L>      07 Apr 2020 03:45  Ca    7.6<L>      06 Apr 2020 02:05  Ca    8.1<L>      05 Apr 2020 06:00  Phos  1.3     04-07  Mg     2.1     04-07               9.7    4.39  )-----------( 303      ( 07 Apr 2020 03:45 )             30.3                         8.6    3.99  )-----------( 317      ( 06 Apr 2020 02:05 )             26.5                         9.0    5.36  )-----------( 341      ( 05 Apr 2020 06:00 )             28.4     CAPILLARY BLOOD GLUCOSE    RECENT CULTURES:  04-02 @ 12:30  .Stool Feces  --  --  --    No Protozoa seen by trichrome stain  No Helminths or Protozoa seen in formalin concentrate  performed by iodine stain  (routine O+P not evaluated for Microsporidia,  Cryptosporidia, Cyclospora, or Isospora.)  One negative sample does not necessarily rule  out the presence of a parasitic infection.  --     RADIOLOGY & ADDITIONAL TESTS: N  Consultants involved in case: heme/onc

## 2020-04-07 NOTE — PROGRESS NOTE ADULT - PROBLEM SELECTOR PLAN 2
# P/w uncontrollable pain, could not walk, hx vertebral compression fractures  - C/w pain regimen by scale, Tylenol --> Oxycodone 5 q4h --> Morphine breakthrough severe. used oxy x 1/24 hours. Observed patient ambulating, able to ambulate, feels discomfort (like bones rubbing together) when walking, but states it is not pain, and oxy does not help with that component of pain during weight-bearing. May need to consider other modalities (re: RT) in future

## 2020-04-07 NOTE — PROGRESS NOTE ADULT - ASSESSMENT
58 yo M with hx multiple myeloma (untreated prior to admission, hx of tx in 2018) and HTN who p/w uncontrollable pain (back, hx vertebral compression fractures) limiting ambulation, admitted 3/29/2020 for pain control (oxycodone), MM treatment (hem/onc following, s/p BM bx 3/31 plasma cell neoplasm, received C1D1 velcade/dexamethasone per hem/onc), remains admitted for continued MM treatment and pain control. Also with hyponatremia - isoosmolar hypoNa with high serum protein prior - pseudohyponatremia likely due to hyperparaproteinemia.    Pending pain control for discharge with hem/onc f/u/ Plan for bortezomib + dexamethasone on Wed (next doses). 58 yo M with hx multiple myeloma (untreated prior to admission, hx of tx in 2018) and HTN who p/w uncontrollable pain (back, hx vertebral compression fractures) limiting ambulation, admitted 3/29/2020 for pain control (oxycodone), MM treatment (hem/onc following, s/p BM bx 3/31 plasma cell neoplasm, received C1D1 velcade/dexamethasone per hem/onc), remains admitted for continued MM treatment and pain control. Also with hyponatremia - isoosmolar hypoNa with high serum protein prior - pseudohyponatremia likely due to hyperparaproteinemia.    Pending pain control for discharge with hem/onc f/u/ Plan for bortezomib + dexamethasone on Wed (next doses).  Plan for d/c Wed after infusions?

## 2020-04-07 NOTE — PROGRESS NOTE ADULT - PROBLEM SELECTOR PLAN 4
- Fluctuating, coni 125, now stabilizing.  - Obtain w/u with urine studies for FeNa and SOsm --> SOsm 289, normal, serum protein high 12.5, likely pseudohyponatremia due to hyperparaproteinemia. - Fluctuating, coni 125  - Obtain w/u with urine studies for FeNa and SOsm --> SOsm 289, normal, serum protein high 12.5, likely pseudohyponatremia due to hyperparaproteinemia.  FeNa 1.3%

## 2020-04-08 LAB
ANION GAP SERPL CALC-SCNC: 9 MMO/L — SIGNIFICANT CHANGE UP (ref 7–14)
BUN SERPL-MCNC: 13 MG/DL — SIGNIFICANT CHANGE UP (ref 7–23)
CALCIUM SERPL-MCNC: 7.4 MG/DL — LOW (ref 8.4–10.5)
CHLORIDE SERPL-SCNC: 106 MMOL/L — SIGNIFICANT CHANGE UP (ref 98–107)
CO2 SERPL-SCNC: 18 MMOL/L — LOW (ref 22–31)
CREAT SERPL-MCNC: 0.72 MG/DL — SIGNIFICANT CHANGE UP (ref 0.5–1.3)
GLUCOSE SERPL-MCNC: 103 MG/DL — HIGH (ref 70–99)
HCT VFR BLD CALC: 27.3 % — LOW (ref 39–50)
HGB BLD-MCNC: 8.8 G/DL — LOW (ref 13–17)
MAGNESIUM SERPL-MCNC: 2 MG/DL — SIGNIFICANT CHANGE UP (ref 1.6–2.6)
MCHC RBC-ENTMCNC: 31.1 PG — SIGNIFICANT CHANGE UP (ref 27–34)
MCHC RBC-ENTMCNC: 32.2 % — SIGNIFICANT CHANGE UP (ref 32–36)
MCV RBC AUTO: 96.5 FL — SIGNIFICANT CHANGE UP (ref 80–100)
NRBC # FLD: 0 K/UL — SIGNIFICANT CHANGE UP (ref 0–0)
PHOSPHATE SERPL-MCNC: 1.8 MG/DL — LOW (ref 2.5–4.5)
PLATELET # BLD AUTO: 300 K/UL — SIGNIFICANT CHANGE UP (ref 150–400)
PMV BLD: 9.7 FL — SIGNIFICANT CHANGE UP (ref 7–13)
POTASSIUM SERPL-MCNC: 3.7 MMOL/L — SIGNIFICANT CHANGE UP (ref 3.5–5.3)
POTASSIUM SERPL-SCNC: 3.7 MMOL/L — SIGNIFICANT CHANGE UP (ref 3.5–5.3)
RBC # BLD: 2.83 M/UL — LOW (ref 4.2–5.8)
RBC # FLD: 15.9 % — HIGH (ref 10.3–14.5)
SODIUM SERPL-SCNC: 133 MMOL/L — LOW (ref 135–145)
WBC # BLD: 4.55 K/UL — SIGNIFICANT CHANGE UP (ref 3.8–10.5)
WBC # FLD AUTO: 4.55 K/UL — SIGNIFICANT CHANGE UP (ref 3.8–10.5)

## 2020-04-08 PROCEDURE — 99232 SBSQ HOSP IP/OBS MODERATE 35: CPT | Mod: GC

## 2020-04-08 RX ORDER — DRONABINOL 2.5 MG
2.5 CAPSULE ORAL DAILY
Refills: 0 | Status: DISCONTINUED | OUTPATIENT
Start: 2020-04-08 | End: 2020-04-09

## 2020-04-08 RX ORDER — SODIUM,POTASSIUM PHOSPHATES 278-250MG
2 POWDER IN PACKET (EA) ORAL ONCE
Refills: 0 | Status: COMPLETED | OUTPATIENT
Start: 2020-04-08 | End: 2020-04-08

## 2020-04-08 RX ADMIN — HEPARIN SODIUM 5000 UNIT(S): 5000 INJECTION INTRAVENOUS; SUBCUTANEOUS at 22:10

## 2020-04-08 RX ADMIN — PANTOPRAZOLE SODIUM 40 MILLIGRAM(S): 20 TABLET, DELAYED RELEASE ORAL at 05:16

## 2020-04-08 RX ADMIN — OXYCODONE HYDROCHLORIDE 5 MILLIGRAM(S): 5 TABLET ORAL at 20:23

## 2020-04-08 RX ADMIN — SODIUM CHLORIDE 75 MILLILITER(S): 9 INJECTION INTRAMUSCULAR; INTRAVENOUS; SUBCUTANEOUS at 22:30

## 2020-04-08 RX ADMIN — BORTEZOMIB 2.3 MILLIGRAM(S): 2.5 INJECTION INTRAVENOUS at 12:15

## 2020-04-08 RX ADMIN — Medication 400 MILLIGRAM(S): at 13:01

## 2020-04-08 RX ADMIN — SODIUM CHLORIDE 75 MILLILITER(S): 9 INJECTION INTRAMUSCULAR; INTRAVENOUS; SUBCUTANEOUS at 07:25

## 2020-04-08 RX ADMIN — HEPARIN SODIUM 5000 UNIT(S): 5000 INJECTION INTRAVENOUS; SUBCUTANEOUS at 05:16

## 2020-04-08 RX ADMIN — Medication 85 MILLIMOLE(S): at 14:24

## 2020-04-08 RX ADMIN — Medication 1 TABLET(S): at 13:01

## 2020-04-08 RX ADMIN — HEPARIN SODIUM 5000 UNIT(S): 5000 INJECTION INTRAVENOUS; SUBCUTANEOUS at 13:02

## 2020-04-08 RX ADMIN — Medication 2 PACKET(S): at 14:24

## 2020-04-08 RX ADMIN — Medication 40 MILLIGRAM(S): at 11:42

## 2020-04-08 RX ADMIN — ONDANSETRON 8 MILLIGRAM(S): 8 TABLET, FILM COATED ORAL at 11:41

## 2020-04-08 NOTE — PROGRESS NOTE ADULT - PROBLEM SELECTOR PLAN 5
Diet: regular with Ensure Enlive  Bowel regimen: senna qhs, Mg(OH)2 as needed  DVT ppx: HSQ  Dispo: pending hem/onc plan for continued tx Diet: regular with Ensure Enlive  Bowel regimen: senna qhs, Mg(OH)2 as needed  DVT ppx: HSQ  Dispo: possible discharge later today after treatment

## 2020-04-08 NOTE — PROGRESS NOTE ADULT - PROBLEM SELECTOR PLAN 1
#Hx MM, last treated 2018, now p/w severe pain, hx vertebral compression fracture, BM bx 3/31 plasma cell neoplasm, s/p bortezomib (Velcade) and dexamethasone 4/1, per onc: plan for C1D8 velcade/dexamethasone on 4/8 #Hx MM, last treated 2018, now p/w severe pain, hx vertebral compression fracture, BM bx 3/31 plasma cell neoplasm, s/p bortezomib (Velcade) and dexamethasone 4/1, per onc: plan for C1D8 velcade/dexamethasone on 4/8  may be discharge later today after treatment as per heme/ onc

## 2020-04-08 NOTE — PROGRESS NOTE ADULT - SUBJECTIVE AND OBJECTIVE BOX
PROGRESS NOTE:   Authored by Dr. Kermit Driscoll MD Pager [phone] [facility], [place] [address]    Patient is a 59y old  Male who presents with a chief complaint of CC: pain (07 Apr 2020 07:10)      SUBJECTIVE / OVERNIGHT EVENTS:    ADDITIONAL REVIEW OF SYSTEMS:    MEDICATIONS  (STANDING):  acyclovir   Oral Tab/Cap 400 milliGRAM(s) Oral daily  bortezomib SUBCUTANEOUS (eMAR) 2.3 milliGRAM(s) SubCutaneous once  dexAMETHasone     Tablet 40 milliGRAM(s) Oral once  heparin  Injectable 5000 Unit(s) SubCutaneous every 8 hours  influenza   Vaccine 0.5 milliLiter(s) IntraMuscular once  lidocaine 2% (Preservative-free) Injectable 20 milliLiter(s) Local Injection once  multivitamin 1 Tablet(s) Oral daily  ondansetron Injectable 8 milliGRAM(s) IV Push once  pantoprazole    Tablet 40 milliGRAM(s) Oral before breakfast  senna 2 Tablet(s) Oral at bedtime  sodium chloride 0.9%. 1000 milliLiter(s) (75 mL/Hr) IV Continuous <Continuous>    MEDICATIONS  (PRN):  acetaminophen   Tablet .. 650 milliGRAM(s) Oral every 4 hours PRN Mild Pain (1 - 3)  magnesium hydroxide Suspension 30 milliLiter(s) Oral daily PRN Constipation  morphine  - Injectable 2 milliGRAM(s) IV Push every 2 hours PRN Severe Pain (7 - 10)  oxyCODONE    IR 5 milliGRAM(s) Oral every 4 hours PRN Moderate Pain (4 - 6)      CAPILLARY BLOOD GLUCOSE        I&O's Summary      PHYSICAL EXAM:  Vital Signs Last 24 Hrs  T(C): 36.2 (07 Apr 2020 11:49), Max: 36.2 (07 Apr 2020 11:49)  T(F): 97.2 (07 Apr 2020 11:49), Max: 97.2 (07 Apr 2020 11:49)  HR: 106 (07 Apr 2020 11:49) (106 - 106)  BP: 146/92 (07 Apr 2020 11:49) (146/92 - 146/92)  BP(mean): --  RR: 18 (07 Apr 2020 11:49) (18 - 18)  SpO2: 100% (07 Apr 2020 11:49) (100% - 100%)    CONSTITUTIONAL: NAD, well-developed  RESPIRATORY: Normal respiratory effort; lungs are clear to auscultation bilaterally  CARDIOVASCULAR: Regular rate and rhythm, normal S1 and S2, no murmur/rub/gallop; No lower extremity edema; Peripheral pulses are 2+ bilaterally  ABDOMEN: Nontender to palpation, normoactive bowel sounds, no rebound/guarding; No hepatosplenomegaly  MUSCLOSKELETAL: no clubbing or cyanosis of digits; no joint swelling or tenderness to palpation  PSYCH: A+O to person, place, and time; affect appropriate    LABS:                        9.7    4.39  )-----------( 303      ( 07 Apr 2020 03:45 )             30.3     04-07    125<L>  |  103  |  14  ----------------------------<  94  4.1   |  14<L>  |  0.78    Ca    7.6<L>      07 Apr 2020 03:45  Phos  1.3     04-07  Mg     2.1     04-07                  Tele Reviewed:    RADIOLOGY & ADDITIONAL TESTS:  Results Reviewed:   Imaging Personally Reviewed:  Electrocardiogram Personally Reviewed:    COORDINATION OF CARE:  Care Discussed with Consultants/Other Providers [Y/N]:  Prior or Outpatient Records Reviewed [Y/N]: PROGRESS NOTE:   Authored by Dr. Kermit Driscoll MD Pager [phone] [facility], [place] [address]    Patient is a 59y old  Male who presents with a chief complaint of CC: pain (07 Apr 2020 07:10)      SUBJECTIVE / OVERNIGHT EVENTS: no overnight events, continues to back pain and [place]. patient continues to be stressed due to ?prognosis of the disease and inability to function at his normal levels. Patient denies fevers, chills, chest pain, shortness of breath, nausea, abdominal pain, diarrhea, constipation, dysuria, leg swelling, headache, light headedness    ADDITIONAL REVIEW OF SYSTEMS:    MEDICATIONS  (STANDING):  acyclovir   Oral Tab/Cap 400 milliGRAM(s) Oral daily  bortezomib SUBCUTANEOUS (eMAR) 2.3 milliGRAM(s) SubCutaneous once  dexAMETHasone     Tablet 40 milliGRAM(s) Oral once  heparin  Injectable 5000 Unit(s) SubCutaneous every 8 hours  influenza   Vaccine 0.5 milliLiter(s) IntraMuscular once  lidocaine 2% (Preservative-free) Injectable 20 milliLiter(s) Local Injection once  multivitamin 1 Tablet(s) Oral daily  ondansetron Injectable 8 milliGRAM(s) IV Push once  pantoprazole    Tablet 40 milliGRAM(s) Oral before breakfast  senna 2 Tablet(s) Oral at bedtime  sodium chloride 0.9%. 1000 milliLiter(s) (75 mL/Hr) IV Continuous <Continuous>    MEDICATIONS  (PRN):  acetaminophen   Tablet .. 650 milliGRAM(s) Oral every 4 hours PRN Mild Pain (1 - 3)  magnesium hydroxide Suspension 30 milliLiter(s) Oral daily PRN Constipation  morphine  - Injectable 2 milliGRAM(s) IV Push every 2 hours PRN Severe Pain (7 - 10)  oxyCODONE    IR 5 milliGRAM(s) Oral every 4 hours PRN Moderate Pain (4 - 6)      CAPILLARY BLOOD GLUCOSE        I&O's Summary      PHYSICAL EXAM:  Vital Signs Last 24 Hrs  T(C): 36.2 (07 Apr 2020 11:49), Max: 36.2 (07 Apr 2020 11:49)  T(F): 97.2 (07 Apr 2020 11:49), Max: 97.2 (07 Apr 2020 11:49)  HR: 106 (07 Apr 2020 11:49) (106 - 106)  BP: 146/92 (07 Apr 2020 11:49) (146/92 - 146/92)  BP(mean): --  RR: 18 (07 Apr 2020 11:49) (18 - 18)  SpO2: 100% (07 Apr 2020 11:49) (100% - 100%)    CONSTITUTIONAL: NAD, thin  RESPIRATORY: Normal respiratory effort; lungs are clear to auscultation bilaterally  CARDIOVASCULAR: Regular rate and rhythm, normal S1 and S2, no murmur/rub/gallop; No lower extremity edema; Peripheral pulses are 2+ bilaterally  ABDOMEN: Nontender to palpation, normoactive bowel sounds, no rebound/guarding; No hepatosplenomegaly  MUSCLOSKELETAL: no clubbing or cyanosis of digits; no joint swelling or tenderness to palpation  PSYCH: A+O to person, place, and time; affect appropriate    LABS:                        9.7    4.39  )-----------( 303      ( 07 Apr 2020 03:45 )             30.3     04-07    125<L>  |  103  |  14  ----------------------------<  94  4.1   |  14<L>  |  0.78    Ca    7.6<L>      07 Apr 2020 03:45  Phos  1.3     04-07  Mg     2.1     04-07                  Tele Reviewed:    RADIOLOGY & ADDITIONAL TESTS:  Results Reviewed:   Imaging Personally Reviewed:  Electrocardiogram Personally Reviewed:    COORDINATION OF CARE:  Care Discussed with Consultants/Other Providers [Y/N]:  Prior or Outpatient Records Reviewed [Y/N]:

## 2020-04-08 NOTE — PROGRESS NOTE ADULT - PROBLEM SELECTOR PLAN 4
- Fluctuating, coni 125  - Obtain w/u with urine studies for FeNa and SOsm --> SOsm 289, normal, serum protein high 12.5, likely pseudohyponatremia due to hyperparaproteinemia.  FeNa 1.3%

## 2020-04-08 NOTE — PROGRESS NOTE ADULT - ASSESSMENT
Patient is a 58 y/o M w/ a PMHx of MM (not in treatment PTA) and HTN who presented with uncontrollable pain likely 2/2 MM. Hematology consulted for history of MM. Patient started C1 Blade/Dex on 4/1.    # History of IgG Kappa Multiple Myeloma  - Received 3 cycles RVD in 2018, followed by Pomalyst as patient had a severe skin reaction to Revlimid  - SPEP/UPEP show distinct gamma band (5.86 g/dL on SPEP)  - Serum LATISHA confirming IgG kappa MM  - Serum FLC ratio 432, IgG >8000, B2 microglobulin 6.6,   - MRIs showing widespread myelomatous changes, no large areas of epidural tumor extension. Multiple pathologic fractures most pronounced at T2 level. L4 compression fracture noted again as seen on CT Pelvis end of February.   - Rad Onc consulted who stated that they do not believe RT will help. Possibly can be done as outpt. Neurosurgery note stated that they defer to Rad onc (would not intervene at this time after discussion with patient).   - BMBx performed on 3/31, confirming > 70% plasma cell neoplasm  - Today is C1D8 of Velcade/dex treatment   - would increase pain regimen as pt not comfortable (he is not using prns so would uptitrate po regimen)   - please add marinol as appetite stimulant   - C/w Acyclovir PPx  - once pain under control, no objection from hematologic standpoint for discharge; please include our scheduling unit number on discharge if he would like to f/u at [facility] - [phone]. Discussed with primary hematologist Dr. Vo who is also happy to see patient next week in office.     Plan d/w primary team    Farhat Trejo, PGY-5  Hematology-Oncology Fellow  [phone] ([place]) [phone] ([facility])

## 2020-04-08 NOTE — PROGRESS NOTE ADULT - ATTENDING COMMENTS
58 yo male with relapsed MM, now on VD Cycle 1 Day 8. Discussed at length that additional agents could be added in as an outpatient to improve depth of response (unclear timing of last dose of pomalidomide). Discussed multiple therapeutic options, would hold off on adding cytoxan in to prevent neutropenia. Will monitor pain scale, would recommend uptitrating his oral pain medications given he is still having pain with ambulation (currently 6/10). Discussed that the goal of this hospital stay was to start therapy and to control his pain and that achieving a remission would take time. Discussed the rationale for immunotherapy and it's role in MM.

## 2020-04-08 NOTE — PROGRESS NOTE ADULT - SUBJECTIVE AND OBJECTIVE BOX
INTERVAL HPI/OVERNIGHT EVENTS:  No overnight events. Due for treatment today. Continues to have rt sided rib and hip pain, exacerbated when standing up to walk.     MEDICATIONS  (STANDING):  acyclovir   Oral Tab/Cap 400 milliGRAM(s) Oral daily  heparin  Injectable 5000 Unit(s) SubCutaneous every 8 hours  influenza   Vaccine 0.5 milliLiter(s) IntraMuscular once  lidocaine 2% (Preservative-free) Injectable 20 milliLiter(s) Local Injection once  multivitamin 1 Tablet(s) Oral daily  pantoprazole    Tablet 40 milliGRAM(s) Oral before breakfast  potassium phosphate / sodium phosphate powder 2 Packet(s) Oral once  senna 2 Tablet(s) Oral at bedtime  sodium chloride 0.9%. 1000 milliLiter(s) (75 mL/Hr) IV Continuous <Continuous>  sodium phosphate IVPB 30 milliMole(s) IV Intermittent once    MEDICATIONS  (PRN):  acetaminophen   Tablet .. 650 milliGRAM(s) Oral every 4 hours PRN Mild Pain (1 - 3)  magnesium hydroxide Suspension 30 milliLiter(s) Oral daily PRN Constipation  morphine  - Injectable 2 milliGRAM(s) IV Push every 2 hours PRN Severe Pain (7 - 10)  oxyCODONE    IR 5 milliGRAM(s) Oral every 4 hours PRN Moderate Pain (4 - 6)    Allergies    No Known Allergies    Intolerances          VITAL SIGNS:  T(F): 98.6 (04-08-20 @ 11:15)  HR: 104 (04-08-20 @ 11:15)  BP: 126/83 (04-08-20 @ 11:15)  RR: 18 (04-08-20 @ 11:15)  SpO2: 99% (04-08-20 @ 11:15)  Wt(kg): --    PHYSICAL EXAM:    Limiting exam due to COVID19. Please refer to internal medicine exam for details.     LABS:                        8.8    4.55  )-----------( 300      ( 08 Apr 2020 06:35 )             27.3     04-08    133<L>  |  106  |  13  ----------------------------<  103<H>  3.7   |  18<L>  |  0.72    Ca    7.4<L>      08 Apr 2020 06:35  Phos  1.8     04-08  Mg     2.0     04-08            RADIOLOGY & ADDITIONAL TESTS:  Studies reviewed.

## 2020-04-08 NOTE — PROGRESS NOTE ADULT - PROBLEM SELECTOR PLAN 2
# P/w uncontrollable pain, could not walk, hx vertebral compression fractures  - C/w pain regimen by scale, Tylenol --> Oxycodone 5 q4h --> Morphine breakthrough severe. used oxy x 1/24 hours. Observed patient ambulating, able to ambulate, feels discomfort (like bones rubbing together) when walking, but states it is not pain, and oxy does not help with that component of pain during weight-bearing. May need to consider other modalities (re: RT) in future # P/w uncontrollable pain, could not walk, hx vertebral compression fractures  - C/w pain regimen by scale, Tylenol --> Oxycodone 5 q4h --> Morphine breakthrough severe. used oxy x 1/24 hours not using morphine. Observed patient ambulating, able to ambulate, feels discomfort (like bones rubbing together) when walking, but states it is not pain, and oxy does not help with that component of pain during weight-bearing. May need to consider other modalities (re: RT) in future

## 2020-04-08 NOTE — PROGRESS NOTE ADULT - ASSESSMENT
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 Also with hyponatremia - isoosmolar hypoNa with high serum protein prior - pseudohyponatremia likely due to hyperparaproteinemia.    Pending pain control for discharge with hem/onc f/u/ Plan for bortezomib + dexamethasone on Wed (next doses). 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 Also with hyponatremia - isoosmolar hypoNa with high serum protein prior - pseudohyponatremia likely due to hyperparaproteinemia.    Pending pain control for discharge with hem/onc f/u/ Plan for bortezomib + dexamethasone on Wed (next doses).  possible discharge later today

## 2020-04-09 ENCOUNTER — TRANSCRIPTION ENCOUNTER (OUTPATIENT)
Age: 60
End: 2020-04-09

## 2020-04-09 VITALS
TEMPERATURE: 100 F | OXYGEN SATURATION: 100 % | DIASTOLIC BLOOD PRESSURE: 91 MMHG | RESPIRATION RATE: 16 BRPM | SYSTOLIC BLOOD PRESSURE: 130 MMHG | HEART RATE: 95 BPM

## 2020-04-09 LAB
ALBUMIN SERPL ELPH-MCNC: 2.9 G/DL — LOW (ref 3.3–5)
ALP SERPL-CCNC: 71 U/L — SIGNIFICANT CHANGE UP (ref 40–120)
ALT FLD-CCNC: < 5 U/L — SIGNIFICANT CHANGE UP (ref 4–41)
ANION GAP SERPL CALC-SCNC: 8 MMO/L — SIGNIFICANT CHANGE UP (ref 7–14)
AST SERPL-CCNC: 11 U/L — SIGNIFICANT CHANGE UP (ref 4–40)
BILIRUB SERPL-MCNC: 0.5 MG/DL — SIGNIFICANT CHANGE UP (ref 0.2–1.2)
BUN SERPL-MCNC: 15 MG/DL — SIGNIFICANT CHANGE UP (ref 7–23)
CALCIUM SERPL-MCNC: 8 MG/DL — LOW (ref 8.4–10.5)
CHLORIDE SERPL-SCNC: 104 MMOL/L — SIGNIFICANT CHANGE UP (ref 98–107)
CO2 SERPL-SCNC: 21 MMOL/L — LOW (ref 22–31)
CREAT SERPL-MCNC: 0.7 MG/DL — SIGNIFICANT CHANGE UP (ref 0.5–1.3)
GLUCOSE SERPL-MCNC: 124 MG/DL — HIGH (ref 70–99)
HCT VFR BLD CALC: 28.1 % — LOW (ref 39–50)
HGB BLD-MCNC: 9.1 G/DL — LOW (ref 13–17)
MAGNESIUM SERPL-MCNC: 2.2 MG/DL — SIGNIFICANT CHANGE UP (ref 1.6–2.6)
MCHC RBC-ENTMCNC: 31 PG — SIGNIFICANT CHANGE UP (ref 27–34)
MCHC RBC-ENTMCNC: 32.4 % — SIGNIFICANT CHANGE UP (ref 32–36)
MCV RBC AUTO: 95.6 FL — SIGNIFICANT CHANGE UP (ref 80–100)
NRBC # FLD: 0 K/UL — SIGNIFICANT CHANGE UP (ref 0–0)
PHOSPHATE SERPL-MCNC: 2.2 MG/DL — LOW (ref 2.5–4.5)
PLATELET # BLD AUTO: 329 K/UL — SIGNIFICANT CHANGE UP (ref 150–400)
PMV BLD: 10 FL — SIGNIFICANT CHANGE UP (ref 7–13)
POTASSIUM SERPL-MCNC: 4 MMOL/L — SIGNIFICANT CHANGE UP (ref 3.5–5.3)
POTASSIUM SERPL-SCNC: 4 MMOL/L — SIGNIFICANT CHANGE UP (ref 3.5–5.3)
PROT SERPL-MCNC: 11.7 G/DL — HIGH (ref 6–8.3)
RBC # BLD: 2.94 M/UL — LOW (ref 4.2–5.8)
RBC # FLD: 15.8 % — HIGH (ref 10.3–14.5)
SODIUM SERPL-SCNC: 133 MMOL/L — LOW (ref 135–145)
WBC # BLD: 6.84 K/UL — SIGNIFICANT CHANGE UP (ref 3.8–10.5)
WBC # FLD AUTO: 6.84 K/UL — SIGNIFICANT CHANGE UP (ref 3.8–10.5)

## 2020-04-09 PROCEDURE — 99232 SBSQ HOSP IP/OBS MODERATE 35: CPT | Mod: GC

## 2020-04-09 RX ORDER — ACYCLOVIR SODIUM 500 MG
1 VIAL (EA) INTRAVENOUS
Qty: 30 | Refills: 0
Start: 2020-04-09 | End: 2020-05-08

## 2020-04-09 RX ORDER — DRONABINOL 2.5 MG
1 CAPSULE ORAL
Qty: 5 | Refills: 0
Start: 2020-04-09 | End: 2020-04-13

## 2020-04-09 RX ORDER — SODIUM,POTASSIUM PHOSPHATES 278-250MG
1 POWDER IN PACKET (EA) ORAL
Qty: 56 | Refills: 0
Start: 2020-04-09 | End: 2020-04-22

## 2020-04-09 RX ORDER — SODIUM,POTASSIUM PHOSPHATES 278-250MG
1 POWDER IN PACKET (EA) ORAL
Qty: 28 | Refills: 0
Start: 2020-04-09 | End: 2020-04-15

## 2020-04-09 RX ORDER — IBUPROFEN 200 MG
1 TABLET ORAL
Qty: 0 | Refills: 0 | DISCHARGE

## 2020-04-09 RX ORDER — OXYCODONE HYDROCHLORIDE 5 MG/1
1 TABLET ORAL
Qty: 30 | Refills: 0
Start: 2020-04-09 | End: 2020-04-13

## 2020-04-09 RX ORDER — SODIUM,POTASSIUM PHOSPHATES 278-250MG
1 POWDER IN PACKET (EA) ORAL
Qty: 28 | Refills: 0
Start: 2020-04-09 | End: 2020-04-22

## 2020-04-09 RX ADMIN — OXYCODONE HYDROCHLORIDE 5 MILLIGRAM(S): 5 TABLET ORAL at 08:44

## 2020-04-09 RX ADMIN — OXYCODONE HYDROCHLORIDE 5 MILLIGRAM(S): 5 TABLET ORAL at 13:02

## 2020-04-09 RX ADMIN — SODIUM CHLORIDE 75 MILLILITER(S): 9 INJECTION INTRAMUSCULAR; INTRAVENOUS; SUBCUTANEOUS at 08:45

## 2020-04-09 RX ADMIN — HEPARIN SODIUM 5000 UNIT(S): 5000 INJECTION INTRAVENOUS; SUBCUTANEOUS at 06:21

## 2020-04-09 RX ADMIN — Medication 400 MILLIGRAM(S): at 13:02

## 2020-04-09 RX ADMIN — SENNA PLUS 2 TABLET(S): 8.6 TABLET ORAL at 01:48

## 2020-04-09 RX ADMIN — PANTOPRAZOLE SODIUM 40 MILLIGRAM(S): 20 TABLET, DELAYED RELEASE ORAL at 06:21

## 2020-04-09 RX ADMIN — Medication 1 TABLET(S): at 13:02

## 2020-04-09 NOTE — DISCHARGE NOTE NURSING/CASE MANAGEMENT/SOCIAL WORK - PATIENT PORTAL LINK FT
You can access the FollowMyHealth Patient Portal offered by [facility] by registering at the following website: [email or web address]. By joining [facility]’s FollowMyHealth portal, you will also be able to view your health information using other applications (apps) compatible with our system.

## 2020-04-09 NOTE — PROGRESS NOTE ADULT - PROBLEM SELECTOR PLAN 5
Diet: regular with Ensure Enlive  Bowel regimen: senna qhs, Mg(OH)2 as needed  DVT ppx: HSQ  Dispo: possible discharge later today after treatment

## 2020-04-09 NOTE — DISCHARGE NOTE NURSING/CASE MANAGEMENT/SOCIAL WORK - NSDCFUADDAPPT_GEN_ALL_CORE_FT
You will follow-up with hematology-oncology at [facility] to continue treatment for the multiple myeloma.

## 2020-04-09 NOTE — DISCHARGE NOTE NURSING/CASE MANAGEMENT/SOCIAL WORK - NSDCPEEMAIL_GEN_ALL_CORE
[facility] for Tobacco [facility] email [email or web address]

## 2020-04-09 NOTE — PROGRESS NOTE ADULT - REASON FOR ADMISSION
CC: pain

## 2020-04-09 NOTE — PROGRESS NOTE ADULT - NSHPATTENDINGPLANDISCUSS_GEN_ALL_CORE
patient
patient and Dr Tobias
patient, Dr Tobias and nursing staff
fellow, team
fellow, team
patient, Fran Tobias

## 2020-04-09 NOTE — DISCHARGE NOTE NURSING/CASE MANAGEMENT/SOCIAL WORK - NSDCPEWEB_GEN_ALL_CORE
[place] website --- [email or web address] Tobacco [email or web address] website --- [email or web address]

## 2020-04-09 NOTE — PROGRESS NOTE ADULT - SUBJECTIVE AND OBJECTIVE BOX
INTERVAL EVENTS:  No overnight events. Reports rt sided rib and hip pain, all the questioned answered again,        Vital Signs Last 24 Hrs  T(C): 36.6 (09 Apr 2020 06:30), Max: 36.8 (08 Apr 2020 21:00)  T(F): 97.9 (09 Apr 2020 06:30), Max: 98.3 (08 Apr 2020 21:00)  HR: 99 (09 Apr 2020 06:30) (99 - 113)  BP: 143/93 (09 Apr 2020 06:30) (143/86 - 143/93)  BP(mean): --  RR: 18 (09 Apr 2020 06:30) (18 - 18)  SpO2: 99% (09 Apr 2020 06:30) (99% - 99%)      PHYSICAL EXAM:   Limiting exam due to COVID19. Please refer to internal medicine exam for details.                           9.1    6.84  )-----------( 329      ( 09 Apr 2020 05:35 )             28.1     04-09    133<L>  |  104  |  15  ----------------------------<  124<H>  4.0   |  21<L>  |  0.70    Ca    8.0<L>      09 Apr 2020 05:35  Phos  2.2     04-09  Mg     2.2     04-09    TPro  11.7<H>  /  Alb  2.9<L>  /  TBili  0.5  /  DBili  x   /  AST  11  /  ALT  < 5  /  AlkPhos  71  04-09        MEDICATIONS  (STANDING):  acyclovir   Oral Tab/Cap 400 milliGRAM(s) Oral daily  dronabinol 2.5 milliGRAM(s) Oral daily  heparin  Injectable 5000 Unit(s) SubCutaneous every 8 hours  influenza   Vaccine 0.5 milliLiter(s) IntraMuscular once  lidocaine 2% (Preservative-free) Injectable 20 milliLiter(s) Local Injection once  multivitamin 1 Tablet(s) Oral daily  pantoprazole    Tablet 40 milliGRAM(s) Oral before breakfast  senna 2 Tablet(s) Oral at bedtime  sodium chloride 0.9%. 1000 milliLiter(s) (75 mL/Hr) IV Continuous <Continuous> INTERVAL EVENTS:  No overnight events. Reports rt sided rib and hip pain, all the questions answered again,        Vital Signs Last 24 Hrs  T(C): 36.6 (09 Apr 2020 06:30), Max: 36.8 (08 Apr 2020 21:00)  T(F): 97.9 (09 Apr 2020 06:30), Max: 98.3 (08 Apr 2020 21:00)  HR: 99 (09 Apr 2020 06:30) (99 - 113)  BP: 143/93 (09 Apr 2020 06:30) (143/86 - 143/93)  BP(mean): --  RR: 18 (09 Apr 2020 06:30) (18 - 18)  SpO2: 99% (09 Apr 2020 06:30) (99% - 99%)      PHYSICAL EXAM:   Limiting exam due to COVID19. Please refer to internal medicine exam for details.                           9.1    6.84  )-----------( 329      ( 09 Apr 2020 05:35 )             28.1     04-09    133<L>  |  104  |  15  ----------------------------<  124<H>  4.0   |  21<L>  |  0.70    Ca    8.0<L>      09 Apr 2020 05:35  Phos  2.2     04-09  Mg     2.2     04-09    TPro  11.7<H>  /  Alb  2.9<L>  /  TBili  0.5  /  DBili  x   /  AST  11  /  ALT  < 5  /  AlkPhos  71  04-09        MEDICATIONS  (STANDING):  acyclovir   Oral Tab/Cap 400 milliGRAM(s) Oral daily  dronabinol 2.5 milliGRAM(s) Oral daily  heparin  Injectable 5000 Unit(s) SubCutaneous every 8 hours  influenza   Vaccine 0.5 milliLiter(s) IntraMuscular once  lidocaine 2% (Preservative-free) Injectable 20 milliLiter(s) Local Injection once  multivitamin 1 Tablet(s) Oral daily  pantoprazole    Tablet 40 milliGRAM(s) Oral before breakfast  senna 2 Tablet(s) Oral at bedtime  sodium chloride 0.9%. 1000 milliLiter(s) (75 mL/Hr) IV Continuous <Continuous>

## 2020-04-09 NOTE — PROGRESS NOTE ADULT - SUBJECTIVE AND OBJECTIVE BOX
Helen Alcala M.D.  Beeper: [phone] ([facility])/ [id number] ([facility])     INTERNAL MEDICINE PROGRESS NOTE    Patient is a 59y old  Male who presents with a chief complaint of CC: pain (08 Apr 2020 13:30)      Overnight events:  Subjective:    Medications:  MEDICATIONS  (STANDING):  acyclovir   Oral Tab/Cap 400 milliGRAM(s) Oral daily  dronabinol 2.5 milliGRAM(s) Oral daily  heparin  Injectable 5000 Unit(s) SubCutaneous every 8 hours  influenza   Vaccine 0.5 milliLiter(s) IntraMuscular once  lidocaine 2% (Preservative-free) Injectable 20 milliLiter(s) Local Injection once  multivitamin 1 Tablet(s) Oral daily  pantoprazole    Tablet 40 milliGRAM(s) Oral before breakfast  senna 2 Tablet(s) Oral at bedtime  sodium chloride 0.9%. 1000 milliLiter(s) (75 mL/Hr) IV Continuous <Continuous>    MEDICATIONS  (PRN):  acetaminophen   Tablet .. 650 milliGRAM(s) Oral every 4 hours PRN Mild Pain (1 - 3)  magnesium hydroxide Suspension 30 milliLiter(s) Oral daily PRN Constipation  morphine  - Injectable 2 milliGRAM(s) IV Push every 2 hours PRN Severe Pain (7 - 10)  oxyCODONE    IR 5 milliGRAM(s) Oral every 4 hours PRN Moderate Pain (4 - 6)      Objective:    Vitals: Vital Signs Last 24 Hrs  T(C): 36.8 (04-08-20 @ 21:00), Max: 37 (04-08-20 @ 11:15)  T(F): 98.3 (04-08-20 @ 21:00), Max: 98.6 (04-08-20 @ 11:15)  HR: 113 (04-08-20 @ 21:00) (104 - 113)  BP: 143/86 (04-08-20 @ 21:00) (126/83 - 143/86)  BP(mean): --  RR: 18 (04-08-20 @ 21:00) (18 - 18)  SpO2: 99% (04-08-20 @ 21:00) (99% - 99%)              I&O's Summary    08 Apr 2020 07:01  -  09 Apr 2020 07:00  --------------------------------------------------------  IN: 1700 mL / OUT: 600 mL / NET: 1100 mL        PHYSICAL EXAM:  GENERAL: NAD, conversant  CHEST/LUNG: Clear to auscultation bilaterally; No crackles, rhonchi, wheezing, or rubs  HEART: Regular rate and rhythm; No murmurs, rubs, or gallops  ABDOMEN: Soft, Nontender, Nondistended; Bowel sounds present  EXTREMITIES:  2+ Peripheral Pulses, No clubbing, cyanosis, or edema  SKIN: No rashes or lesions  NERVOUS SYSTEM:  Alert & Oriented, Good concentration                                                                                    LABS:  04-08    133<L>  |  106  |  13  ----------------------------<  103<H>  3.7   |  18<L>  |  0.72  04-07    125<L>  |  103  |  14  ----------------------------<  94  4.1   |  14<L>  |  0.78    Ca    7.4<L>      08 Apr 2020 06:35  Ca    7.6<L>      07 Apr 2020 03:45  Phos  1.8     04-08  Mg     2.0     04-08                                                      9.1    6.84  )-----------( 329      ( 09 Apr 2020 05:35 )             28.1                         8.8    4.55  )-----------( 300      ( 08 Apr 2020 06:35 )             27.3                         9.7    4.39  )-----------( 303      ( 07 Apr 2020 03:45 )             30.3     CAPILLARY BLOOD GLUCOSE            RECENT CULTURES:  04-02 @ 12:30  .Stool Feces  --  --  --    No Protozoa seen by trichrome stain  No Helminths or Protozoa seen in formalin concentrate  performed by iodine stain  (routine O+P not evaluated for Microsporidia,  Cryptosporidia, Cyclospora, or Isospora.)  One negative sample does not necessarily rule  out the presence of a parasitic infection.  --     RADIOLOGY & ADDITIONAL TESTS: N  Consultants involved in case: N Helen Alcala M.D.  Beeper: [phone] ([facility])/ [phone] ([facility])     INTERNAL MEDICINE PROGRESS NOTE    Patient is a 59y old  Male who presents with a chief complaint of CC: pain (08 Apr 2020 13:30)    Overnight events: none  Subjective: c/o of pain as prior, taking the oxy    Medications:  MEDICATIONS  (STANDING):  acyclovir   Oral Tab/Cap 400 milliGRAM(s) Oral daily  dronabinol 2.5 milliGRAM(s) Oral daily  heparin  Injectable 5000 Unit(s) SubCutaneous every 8 hours  influenza   Vaccine 0.5 milliLiter(s) IntraMuscular once  lidocaine 2% (Preservative-free) Injectable 20 milliLiter(s) Local Injection once  multivitamin 1 Tablet(s) Oral daily  pantoprazole    Tablet 40 milliGRAM(s) Oral before breakfast  senna 2 Tablet(s) Oral at bedtime  sodium chloride 0.9%. 1000 milliLiter(s) (75 mL/Hr) IV Continuous <Continuous>    MEDICATIONS  (PRN):  acetaminophen   Tablet .. 650 milliGRAM(s) Oral every 4 hours PRN Mild Pain (1 - 3)  magnesium hydroxide Suspension 30 milliLiter(s) Oral daily PRN Constipation  morphine  - Injectable 2 milliGRAM(s) IV Push every 2 hours PRN Severe Pain (7 - 10)  oxyCODONE    IR 5 milliGRAM(s) Oral every 4 hours PRN Moderate Pain (4 - 6)      Objective:    Vitals: Vital Signs Last 24 Hrs  T(C): 36.8 (04-08-20 @ 21:00), Max: 37 (04-08-20 @ 11:15)  T(F): 98.3 (04-08-20 @ 21:00), Max: 98.6 (04-08-20 @ 11:15)  HR: 113 (04-08-20 @ 21:00) (104 - 113)  BP: 143/86 (04-08-20 @ 21:00) (126/83 - 143/86)  BP(mean): --  RR: 18 (04-08-20 @ 21:00) (18 - 18)  SpO2: 99% (04-08-20 @ 21:00) (99% - 99%)              I&O's Summary    08 Apr 2020 07:01  -  09 Apr 2020 07:00  --------------------------------------------------------  IN: 1700 mL / OUT: 600 mL / NET: 1100 mL    PHYSICAL EXAM:  GENERAL: NAD, conversant  CHEST/LUNG: Clear to auscultation bilaterally ant lung only; No crackles, rhonchi, wheezing, or rubs  HEART: Regular rate and rhythm; No murmurs, rubs, or gallops  EXTREMITIES: No edema  NERVOUS SYSTEM:  Alert & Oriented, Good concentration                                                                                  LABS:  04-09    133<L>  |  104  |  15  ----------------------------<  124<H>  4.0   |  21<L>  |  0.70  04-08    133<L>  |  106  |  13  ----------------------------<  103<H>  3.7   |  18<L>  |  0.72  04-07    125<L>  |  103  |  14  ----------------------------<  94  4.1   |  14<L>  |  0.78    Ca    8.0<L>      09 Apr 2020 05:35  Ca    7.4<L>      08 Apr 2020 06:35  Ca    7.6<L>      07 Apr 2020 03:45  Phos  2.2     04-09  Mg     2.2     04-09    TPro  11.7<H>  /  Alb  2.9<L>  /  TBili  0.5  /  DBili  x   /  AST  11  /  ALT  < 5  /  AlkPhos  71  04-09                                              9.1    6.84  )-----------( 329      ( 09 Apr 2020 05:35 )             28.1                         8.8    4.55  )-----------( 300      ( 08 Apr 2020 06:35 )             27.3                         9.7    4.39  )-----------( 303      ( 07 Apr 2020 03:45 )             30.3     CAPILLARY BLOOD GLUCOSE    RECENT CULTURES:  04-02 @ 12:30  .Stool Feces  --  --  --    No Protozoa seen by trichrome stain  No Helminths or Protozoa seen in formalin concentrate  performed by iodine stain  (routine O+P not evaluated for Microsporidia,  Cryptosporidia, Cyclospora, or Isospora.)  One negative sample does not necessarily rule  out the presence of a parasitic infection.  --     RADIOLOGY & ADDITIONAL TESTS: N  Consultants involved in case: hem/onc

## 2020-04-09 NOTE — PROGRESS NOTE ADULT - PROBLEM SELECTOR PLAN 1
#Hx MM, last treated 2018, now p/w severe pain, hx vertebral compression fracture, BM bx 3/31 plasma cell neoplasm, s/p bortezomib (Velcade) and dexamethasone 4/1, per onc: plan for C1D8 velcade/dexamethasone on 4/8  may be discharge later today after treatment as per heme/ onc #Hx MM, last treated 2018, now p/w severe pain, hx vertebral compression fracture, BM bx 3/31 plasma cell neoplasm, s/p bortezomib (Velcade) and dexamethasone 4/1, per onc: s/p C1D8 velcade/dexamethasone on 4/8

## 2020-04-09 NOTE — PROGRESS NOTE ADULT - PROBLEM SELECTOR PLAN 2
# P/w uncontrollable pain, could not walk, hx vertebral compression fractures  - C/w pain regimen by scale, Tylenol --> Oxycodone 5 q4h --> Morphine breakthrough severe. used oxy x 1/24 hours not using morphine. Observed patient ambulating, able to ambulate, feels discomfort (like bones rubbing together) when walking, but states it is not pain, and oxy does not help with that component of pain during weight-bearing. May need to consider other modalities (re: RT) in future # P/w uncontrollable pain, could not walk, hx vertebral compression fractures  - C/w pain regimen by scale, Tylenol --> Oxycodone 5 q4h --> Morphine breakthrough severe. used oxy x 1/24 hours not using morphine. Observed patient ambulating, able to ambulate, feels discomfort (like bones rubbing together) when walking, but states it is not pain, and oxy does not help with that component of pain during weight-bearing. May need to consider other modalities (re: RT) in future  - Discharge on 5 d supply of oxycodone 5 mg q4h

## 2020-04-09 NOTE — PROGRESS NOTE ADULT - ATTENDING COMMENTS
58 yo male with relapsed MM, now on VD Cycle 1 Day 8. Discussed at length that additional agents could be added in as an outpatient to improve depth of response (unclear timing of last dose of pomalidomide). Discussed at length the potential therapeutic options which are available, including additional oral and IV infusions. We discussed the role of transplant in multiple myeloma. Further treatment discussions will be dependent on outpatient therapy. Of note, total protein is now 11.7 so hopefully he is having some early response to therapy.     Cira Casiano MD  Attending, Hematology/Oncology

## 2020-04-09 NOTE — PROGRESS NOTE ADULT - ASSESSMENT
58 yo M with hx multiple myeloma (untreated prior to admission, hx of tx in 2018) and HTN who p/w uncontrollable pain (back, hx vertebral compression fractures) limiting ambulation, admitted 3/29/2020 for pain control (oxycodone), MM treatment (hem/onc following, s/p BM bx 3/31 plasma cell neoplasm, received C1D1 velcade/dexamethasone per hem/onc), remains admitted for continued MM treatment and pain control. Also with hyponatremia - isoosmolar hypoNa with high serum protein prior - pseudohyponatremia likely due to hyperparaproteinemia.    Pending pain control for discharge with hem/onc f/u/ Plan for bortezomib + dexamethasone on Wed (next doses).  possible discharge later today 58 yo M with hx multiple myeloma (untreated prior to admission, hx of tx in 2018) and HTN who p/w uncontrollable pain (back, hx vertebral compression fractures) limiting ambulation, admitted 3/29/2020 for pain control (oxycodone), MM treatment (hem/onc following, s/p BM bx 3/31 plasma cell neoplasm, received C1D1 velcade/dexamethasone per hem/onc), remains admitted for continued MM treatment and pain control. Also with hyponatremia - isoosmolar hypoNa with high serum protein prior - pseudohyponatremia likely due to hyperparaproteinemia.    Discharge today with [facility] follow-up

## 2020-04-09 NOTE — PROGRESS NOTE ADULT - ASSESSMENT
Patient is a 60 y/o M w/ a PMHx of MM (not in treatment PTA) and HTN who presented with uncontrollable pain likely 2/2 MM. Hematology consulted for history of MM. Patient started C1 Blade/Dex on 4/1.    # History of IgG Kappa Multiple Myeloma  - Received 3 cycles RVD in 2018, followed by Pomalyst as patient had a severe skin reaction to Revlimid  - SPEP/UPEP show distinct gamma band (5.86 g/dL on SPEP)  - Serum LATISHA confirming IgG kappa MM  - Serum FLC ratio 432, IgG >8000, B2 microglobulin 6.6,   - MRIs showing widespread myelomatous changes, no large areas of epidural tumor extension. Multiple pathologic fractures most pronounced at T2 level. L4 compression fracture noted again as seen on CT Pelvis end of February.   - Rad Onc consulted who stated that they do not believe RT will help. Possibly can be done as outpt. Neurosurgery note stated that they defer to Rad onc (would not intervene at this time after discussion with patient).   - BMBx performed on 3/31, confirming > 70% plasma cell neoplasm  - C1D8 of Velcade/dex treatment done on 4/8  - would increase pain regimen as pt not comfortable (he is not using prns so would uptitrate po regimen)   - Asked primary team to add marinol as appetite stimulant   - C/w Acyclovir PPx  - once pain under control, no objection from hematologic standpoint for discharge; please include our scheduling unit number on discharge if he would like to f/u at [facility] - [phone]. Discussed with primary hematologist Dr. Vo who is also happy to see patient next week in office.     Plan d/w primary team    Judi Angel, PGY-4  Hematology-Oncology Fellow  [phone]

## 2020-04-16 ENCOUNTER — APPOINTMENT (OUTPATIENT)
Dept: HEMATOLOGY ONCOLOGY | Facility: CLINIC | Age: 60
End: 2020-04-16

## 2020-04-16 ENCOUNTER — OUTPATIENT (OUTPATIENT)
Dept: OUTPATIENT SERVICES | Facility: HOSPITAL | Age: 60
LOS: 1 days | Discharge: HOME | End: 2020-04-16

## 2020-04-16 DIAGNOSIS — C90.01 MULTIPLE MYELOMA IN REMISSION: ICD-10-CM

## 2020-04-22 ENCOUNTER — OUTPATIENT (OUTPATIENT)
Dept: OUTPATIENT SERVICES | Facility: HOSPITAL | Age: 60
LOS: 1 days | Discharge: HOME | End: 2020-04-22
Payer: MEDICAID

## 2020-04-22 NOTE — REASON FOR VISIT
[Consideration for Non-Curative Therapy] : consideration for non-curative therapy for [Other: ___] : [unfilled]

## 2020-04-24 ENCOUNTER — APPOINTMENT (OUTPATIENT)
Dept: RADIATION ONCOLOGY | Facility: CLINIC | Age: 60
End: 2020-04-24
Payer: MEDICAID

## 2020-04-24 DIAGNOSIS — I10 ESSENTIAL (PRIMARY) HYPERTENSION: ICD-10-CM

## 2020-04-24 DIAGNOSIS — Z78.9 OTHER SPECIFIED HEALTH STATUS: ICD-10-CM

## 2020-04-24 PROCEDURE — 77261 THER RADIOLOGY TX PLNG SMPL: CPT

## 2020-04-24 PROCEDURE — 99204 OFFICE O/P NEW MOD 45 MIN: CPT | Mod: 25,GC,95

## 2020-04-24 RX ORDER — DIBASIC SODIUM PHOSPHATE, MONOBASIC POTASSIUM PHOSPHATE AND MONOBASIC SODIUM PHOSPHATE 852; 155; 130 MG/1; MG/1; MG/1
TABLET ORAL
Refills: 0 | Status: ACTIVE | COMMUNITY

## 2020-04-24 NOTE — VITALS
[Least Pain Intensity: 3/10] : 3/10 [Maximal Pain Intensity: 7/10] : 7/10 [Pain Description/Quality: ___] : Pain description/quality: [unfilled] [Pain Duration: ___] : Pain duration: [unfilled] [NSAID/Non-Opioid] : NSAID/Non-Opioid [Pain Location: ___] : Pain Location: [unfilled] [50: Requires considerable assistance and frequent medical care.] : 50: Requires considerable assistance and frequent medical care.

## 2020-04-26 NOTE — REVIEW OF SYSTEMS
[Fatigue] : fatigue [Negative] : Allergic/Immunologic [Fatigue: Grade 1 - Fatigue relieved by rest] : Fatigue: Grade 1 - Fatigue relieved by rest [Headache: Grade 0] : Headache: Grade 0 [Skin Atrophy: Grade 0] : Skin Atrophy: Grade 0 [Dermatitis Radiation: Grade 0] : Dermatitis Radiation: Grade 0 [Skin Hyperpigmentation: Grade 0] : Skin Hyperpigmentation: Grade 0 [FreeTextEntry9] : back pain [de-identified] : unable to walk without stroller

## 2020-04-26 NOTE — HISTORY OF PRESENT ILLNESS
[FreeTextEntry1] : This visit was provided via telehealth utilizing real-time 2-way audio technology. The patient Rolf Robert was located at home, [address] [place] [address] at the time of the visit.  The provider, Rica Villalpando, was located at the clinic in [place], [place] at the time of the visit. The patient, Rolf Robert, Dr. Rica Villalpando, and resident Dr. Ricki Arredondo participated in the telehealth encounter. Verbal consent was given by patient, self.\par \par Mr. Robert is a 59 year old man with multiple myeloma since 2018 who has been on systemic therapy with RVD and now on bortezomib/velcade with bony pain.\par \par He presented to the ED on 2/25/20 with bilateral hip pain and a few months of ongoing intermittent low back pain radiating to both hips.  CTV Pelvis done demonstrating severe joint space narrowing involving the right hip, moderate osteophytic changes on the left.  Abnormal appearance of osseous structures of the pelvis with multiple lytic foci throughout the pelvis with a permeative appearance of the cortex of bilateral iliac bones.   Mild compression deformity at L4 age indeterminant noted.  He was discharged home with heme/onc follow-up.\par \par He presented to the ED on 3/28/20 with uncontrolled pain.  Specifically right sided rib/hip pain and due to his pain was unable to walk.  Chext X-ray noted no acute rib fractures. X-ray pelvis noted multifocal lytic lesions throughout the bone.jaguar montesinos Was seen by neurosurgery on 3/30/20 who recommended rad onc, heme/onc consults and MRI. MRI on 3/31/20 noted moderate compression fracture deformity involving C7 vertebral body with retropulsion of bone and mild bony central canal stenosis without cord compression or epidural extension.  A completely collapsed T2 vertebral body with mild retropulsion of bone into spinal canal with moderate central canal stenosis with indentation of the ventral cord.  No significant epidural tumor extension present and no edema noted within the adjacent spinal cord.  Moderate pathologic compression fracture of T6 without retropulsion of bone/cord compression.  Mild compression fracture of T7 without retropulsion of bone.  Mild of T8 with mild retropulsion of bone and mild central canal stenosis.  Moderate pathologic compression fracture of T10 without retropulsion/compression.  Minimal pathologic compression of inferior endplate of T11. Mild pathologic compression involving T12 and L3.  Moderate pathologic compression fracture involving L4 stable compared to prior CT.  Mild retropulsion of bone present.\par \par He was seen by medical oncology on 3/31/20 who noted he was diagnosied with IgG Kappa Multiple Myeloma in 2018 by Dr. Luis Alberto Vo and received 3 cycles of RVD but developed a severe skin reaction and was switched to pomalyst/pomalidomide.  He had bone marrow biopsy performed among other MM tests which demonstrated plasma cell neoplasm >70%.\par \par On 4/1/20 radiation oncology was consulted as an inpatient with recommendation to re-consult neurosurgery.  Neurosurgery stated given multiple compression fractures, to defer to rad/heme onc.  He underwent bortezomib/velcade and dexamethasone therapy on this day.\par \par Skeletal bone survey on 4/2/20 demonstrated scattered lytic lesions in the calvarium, spine, and pelvis without fracture or dislocation.  Multilevel spondylosis and vertebral wedge compression deformities noted.\par \par At time of discharge heme/onc discussed potential options including transplant.\par \par He presents for consult.  He reports low back pain, upper back pain, and neck pain when he tries to get up.  He takes oxycodone 4x a day with some relief.  He reports for the past month he has had trouble walking at utilizes a stroller to be able to walk.  He says he still feels the chemo within his body.

## 2020-04-29 ENCOUNTER — APPOINTMENT (OUTPATIENT)
Dept: HEMATOLOGY ONCOLOGY | Facility: CLINIC | Age: 60
End: 2020-04-29

## 2020-04-30 ENCOUNTER — APPOINTMENT (OUTPATIENT)
Dept: HEMATOLOGY ONCOLOGY | Facility: CLINIC | Age: 60
End: 2020-04-30
Payer: MEDICAID

## 2020-04-30 PROCEDURE — 99205 OFFICE O/P NEW HI 60 MIN: CPT | Mod: 95

## 2020-04-30 NOTE — HISTORY OF PRESENT ILLNESS
[Other Location: e.g. School (Enter Location, City,State)___] : at [unfilled], at the time of the visit. [Medical Office: ([facility])___] : at the medical office located in  [Patient] : the patient [Self] : self [de-identified] : Mr. Rolf Robert is 60 year old male with IgG Kappa multiple myeloma transferred care from Dr. Amy Vo. \par \par Patient initially diagnosed with Multiple myeloma in 2018 and received 3 cycles of RVD but developed a severe skin reaction and was switched to Pomalyst/Pomalidomide.   \par After 2 months he was recommended stem cell transplant-> he was completely against transplant and was lost to follow up\par \par In Feb 2020, he developed low back pain radiating down to both hips. CT Pelvis done demonstrating severe joint space narrowing involving the right hip, moderate osteophytic changes on the left. Abnormal appearance of osseous structures of the pelvis with multiple lytic foci throughout the pelvis with a permeative appearance of the cortex of bilateral iliac bones. Mild compression deformity at L4 age indeterminant noted. \par B/l hips pain persisted with 3/2020 X-ray of pelvis noted multifocal lytic lesions throughout the bone.  PT was then by neurosurgery on 3/30/20 who recommended rad onc with MRI on 3/31/20 noted moderate compression fracture deformity involving C7 vertebral body with retropulsion of bone and mild bony central canal stenosis without cord compression or epidural extension. A completely collapsed T2 vertebral body with mild retropulsion of bone into spinal canal with moderate central canal stenosis with indentation of the ventral cord. No significant epidural tumor extension present and no edema noted within the adjacent spinal cord. Moderate pathologic compression fracture of T6 without retropulsion of bone/cord compression. Mild compression fracture of T7 without retropulsion of bone. Mild of T8 with mild retropulsion of bone and mild central canal stenosis. Moderate pathologic compression fracture of T10 without retropulsion/compression. Minimal pathologic compression of inferior endplate of T11. Mild pathologic compression involving T12 and L3. Moderate pathologic compression fracture involving L4 stable compared to prior CT. Mild retropulsion of bone present.\par \par Patient had bone marrow biopsy performed among other MM tests which demonstrated plasma cell neoplasm > 70%. \par Hyperdiploidy and gains of chromosomes 3, 5, 9, 11, 15 and 19, as well as, rearrangements of 1p  are recurrently seen in plasma cell disorders.  Complex karyotypic changes are generally associated with an unfavorable clinical course\par FISH myeloma-  Three copies of CCND1 detected (15.5%)\par \par 4/2/20 skeletal survey demonstrated scattered lytic lesions in the calvarium, spine, and pelvis without fracture or dislocation. Multilevel spondylosis and vertebral wedge compression deformities noted.\par \par Patient was then seen by radiation oncologist Dr. Rica Villalpando on 4/24/20.\par \par

## 2020-04-30 NOTE — ASSESSMENT
[FreeTextEntry1] : IgG Kappa multiple myeloma \par Diagnosed 2018 s/p RVD x 3 cycles.\par Developed severe skin reaction to revlimid and was supposed to be switched ot pomalyst.\par However did not want stem cell transplant and decided to stop follow up\par Presented Feb 2020 with low back pain found to have diffuse bone metastases\par Bone marrow (3/20): > 70% clonal plasma cells\par Hyperdiploidy and gains of chromosomes 3, 5, 9, 11, 15 and 19, as well as, rearrangements of 1p  are recurrently seen in plasma cell disorders.  Complex karyotypic changes are generally associated with an unfavorable clinical course\par FISH myeloma-  Three copies of CCND1 detected (15.5%)\par \par He has lower and upper back pain somewhat responsive to pain medication and inability to walk for the last month without a walker. Saw Dr Villalpando (rad onc) and was offered radiation. He is still deciding whether to pursue treatment or not\par \par I had an extensive discussion with the patient and separately with his sister/ HCP about his diagnosis, prognosis and treatment options\par He has now started to walk with walker with better pain control.\par If he does not pursue systemic therapy, he is going to decline with more bone lesions, marrow failure, infection and possible death.\par He and his sister agree. He is however completely against transplant\par \par Given his high risk cytogenetics, recommend\par Daratumumab kyprolis dexamethasone with xgeva\par Risks benefits and side effects discussed at length with patient and his sister\par Sister is agreeable and will talk to the patient before finalizing.\par Schedule treatment for next week\par Also encouraged to pursue radiation/ kyphoplasty as appropriate for the multiple spine lesions\par \par Start chemo next week. Draw labs before the infusion - CBC, CMP, myeloma panel, type and screen, ferritin, ESR, CRP\par Follow up in the office during week 2 with labs\par \par Contact\par CharlaRobertoRaya Robert (sister) - [phone]

## 2020-05-05 ENCOUNTER — RESULT REVIEW (OUTPATIENT)
Age: 60
End: 2020-05-05

## 2020-05-12 ENCOUNTER — RESULT REVIEW (OUTPATIENT)
Age: 60
End: 2020-05-12

## 2020-05-12 ENCOUNTER — APPOINTMENT (OUTPATIENT)
Dept: HEMATOLOGY ONCOLOGY | Facility: CLINIC | Age: 60
End: 2020-05-12
Payer: MEDICAID

## 2020-05-12 VITALS
HEART RATE: 116 BPM | WEIGHT: 120.6 LBS | BODY MASS INDEX: 18.93 KG/M2 | DIASTOLIC BLOOD PRESSURE: 88 MMHG | TEMPERATURE: 98.5 F | RESPIRATION RATE: 18 BRPM | OXYGEN SATURATION: 99 % | SYSTOLIC BLOOD PRESSURE: 136 MMHG | HEIGHT: 67 IN

## 2020-05-12 PROCEDURE — 99215 OFFICE O/P EST HI 40 MIN: CPT

## 2020-05-12 NOTE — HISTORY OF PRESENT ILLNESS
[Other Location: e.g. School (Enter Location, City,State)___] : at [unfilled], at the time of the visit. [Medical Office: ([facility])___] : at the medical office located in  [Patient] : the patient [Self] : self [de-identified] : Mr. Rolf Robert is 60 year old male with IgG Kappa multiple myeloma transferred care from Dr. Amy Vo. \par \par Patient initially diagnosed with Multiple myeloma in 2018 and received 3 cycles of RVD but developed a severe skin reaction and was switched to Pomalyst/Pomalidomide.   \par After 2 months he was recommended stem cell transplant-> he was completely against transplant and was lost to follow up\par \par In Feb 2020, he developed low back pain radiating down to both hips. CT Pelvis done demonstrating severe joint space narrowing involving the right hip, moderate osteophytic changes on the left. Abnormal appearance of osseous structures of the pelvis with multiple lytic foci throughout the pelvis with a permeative appearance of the cortex of bilateral iliac bones. Mild compression deformity at L4 age indeterminant noted. \par B/l hips pain persisted with 3/2020 X-ray of pelvis noted multifocal lytic lesions throughout the bone.  PT was then by neurosurgery on 3/30/20 who recommended rad onc with MRI on 3/31/20 noted moderate compression fracture deformity involving C7 vertebral body with retropulsion of bone and mild bony central canal stenosis without cord compression or epidural extension. A completely collapsed T2 vertebral body with mild retropulsion of bone into spinal canal with moderate central canal stenosis with indentation of the ventral cord. No significant epidural tumor extension present and no edema noted within the adjacent spinal cord. Moderate pathologic compression fracture of T6 without retropulsion of bone/cord compression. Mild compression fracture of T7 without retropulsion of bone. Mild of T8 with mild retropulsion of bone and mild central canal stenosis. Moderate pathologic compression fracture of T10 without retropulsion/compression. Minimal pathologic compression of inferior endplate of T11. Mild pathologic compression involving T12 and L3. Moderate pathologic compression fracture involving L4 stable compared to prior CT. Mild retropulsion of bone present.\par \par Patient had bone marrow biopsy performed among other MM tests which demonstrated plasma cell neoplasm > 70%. \par Hyperdiploidy and gains of chromosomes 3, 5, 9, 11, 15 and 19, as well as, rearrangements of 1p  are recurrently seen in plasma cell disorders.  Complex karyotypic changes are generally associated with an unfavorable clinical course\par FISH myeloma-  Three copies of CCND1 detected (15.5%)\par \par 4/2/20 skeletal survey demonstrated scattered lytic lesions in the calvarium, spine, and pelvis without fracture or dislocation. Multilevel spondylosis and vertebral wedge compression deformities noted.\par \par Patient was then seen by radiation oncologist Dr. Rica Villalpando on 4/24/20.\par \par  [de-identified] : He is seen today for follow and daratumumab kyprolis dexamethasone # 2 (5/5/20-present)\par \par He tolerated the treatment well\par Reports slightly improvement in his sob on exertion\par Bone pain better- has seen Dr Roberson (rad onc) and is not sure if he wants to pursue radiation. I explained again the benefits\par \par /88, now on norvasc

## 2020-05-12 NOTE — ASSESSMENT
[FreeTextEntry1] : Relapsed IgG Kappa multiple myeloma \par Diagnosed 2018 s/p RVD x 3 cycles.\par Developed severe skin reaction to revlimid and was supposed to be switched ot pomalyst.\par However did not want stem cell transplant and decided to stop follow up\par Presented Feb 2020 with low back pain found to have diffuse bone metastases\par Bone marrow (3/20): > 70% clonal plasma cells\par Hyperdiploidy and gains of chromosomes 3, 5, 9, 11, 15 and 19, as well as, rearrangements of 1p  are recurrently seen in plasma cell disorders.  Complex karyotypic changes are generally associated with an unfavorable clinical course\par FISH myeloma-  Three copies of CCND1 detected (15.5%)\par \par Here for daratumumab kyprolis dexamethasone #2 ( 5/5/20-present)\par Tolerated cycle 1 well\par Labs reviewed and discussed\par Hgb improving\par Proceed with cheom\par \par Anemia\par Likely from multiple myeloma with a component of B12 deficiency\par Will give B12 1000 mcg with chemo today\par Start B12 sublingual dialy\par \par Lower and upper back pain \par Bone metastases\par Saw rad onc\par Considering radiation\par \par Next myeloma panel to be sent June 2, 2020\par Follow up in 1 week for DKd # 3\par CBC, CMP\par \par Contact\par Charla-Raya Robert (sister) - [phone]

## 2020-05-13 ENCOUNTER — APPOINTMENT (OUTPATIENT)
Dept: RADIATION ONCOLOGY | Facility: CLINIC | Age: 60
End: 2020-05-13
Payer: MEDICAID

## 2020-05-13 PROCEDURE — 99214 OFFICE O/P EST MOD 30 MIN: CPT | Mod: 95

## 2020-05-13 NOTE — VITALS
[Pain Location: ___] : Pain Location: [unfilled] [Pain Interferes with ADLs] : Pain interferes with activities of daily living. [Opioid] : opioid [60: Requires occasional assistance, but is able to care for most of his/her needs] : 60: Requires occasional assistance, but is able to care for most of his/her needs

## 2020-05-13 NOTE — HISTORY OF PRESENT ILLNESS
[Patient] : the patient [Home] : at home, [unfilled] , at the time of the visit. [Medical Office: ([facility])___] : at the medical office located in  [FreeTextEntry2] : Rolf Robert [FreeTextEntry1] : \par Mr. Robert is a 60 year old male, who was diagnosed with multiple myeloma in 2018 on systemic therapy, being seen to address all questions and concerns prior to the start of radiation therapy. \par \par Mr. Robert has been on systemic therapy, initially with RVD (3 cycles) and was switched to pomalyst after severe skin reaction. He refused stem cell transplant and decided to cease all care. He presented to the ER in February with bilateral hip pain and lower back pain and was found to have diffuse bony metastases. Bone marrow biopsy (3/20) demonstrated greater than 70% clonal plasma cells. He has progressive inability to ambulate without a roller walker and is currently on oxycodone for pain management. He was admitted to [facility] in April and was consulted by neurosurgery, who deemed him not a candidate given the multiple compression fractures. He was seen by Dr. Quinones and started on daratumumab kyprolis dexamethasone weekly on 5/5/20. He underwent CT simulation for radiation to areas of the cervical, thoracic, and lumber spine on 5/8/2020. Mr. Robert is being seen today via telehealth for a follow-up. \par \par Imaging: \par CT Pelvis (2/25/20) demonstrated diffuse lytic lesions throughout the pelvis and lower lumbar spine. Pathological compression at L4. Severe right and moderate left hip arthrosis. \par \par X-ray Pelvis (3/30/20) demonstrated multifocal lytic lesions throughout the bones, consistent with multiple myleoma.\par \par MRI T/L/C Spine (3/31/20) - widespread myelomatous changes throughout the entire spine. No large areas of epidural tumor extension. Multiple pathological compression fractures, most pronounces at T2. \par \par Skeletal bone survey (4/2/20) demonstrated scattered lytic lesions in the calvarium, spine, and pelvis without fracture or dislocation. Severe osteoarthritis in the right hip. \par \par The patient continues to have difficulty with ambulation which he states started a month ago. He needs to use a walker and even then his walking is extremely labored. He also complains of pain in his right ribs along with shortness of breath. He was simulated recently to start palliative radiation to the spine but still had a few questions about his disease and the potential benefits of radiation.

## 2020-05-13 NOTE — PHYSICAL EXAM
[Normal] : well developed, well nourished, in no acute distress [de-identified] : telehealth visit- patient is lying down in bed during the follow up

## 2020-05-13 NOTE — DISEASE MANAGEMENT
[Clinical] : TNM Stage: c [N/A] : Currently not applicable [FreeTextEntry4] : IgG Kappa multiple myeloma [TTNM] : x [NTNM] : x [MTNM] : x

## 2020-05-15 ENCOUNTER — APPOINTMENT (OUTPATIENT)
Dept: CARDIOLOGY | Facility: CLINIC | Age: 60
End: 2020-05-15
Payer: MEDICAID

## 2020-05-15 ENCOUNTER — APPOINTMENT (OUTPATIENT)
Dept: CARDIOLOGY | Facility: CLINIC | Age: 60
End: 2020-05-15

## 2020-05-15 ENCOUNTER — NON-APPOINTMENT (OUTPATIENT)
Age: 60
End: 2020-05-15

## 2020-05-15 VITALS
BODY MASS INDEX: 18.83 KG/M2 | SYSTOLIC BLOOD PRESSURE: 130 MMHG | DIASTOLIC BLOOD PRESSURE: 80 MMHG | WEIGHT: 120 LBS | HEIGHT: 67 IN

## 2020-05-15 PROCEDURE — 99214 OFFICE O/P EST MOD 30 MIN: CPT

## 2020-05-15 PROCEDURE — 93000 ELECTROCARDIOGRAM COMPLETE: CPT

## 2020-05-15 PROCEDURE — 99204 OFFICE O/P NEW MOD 45 MIN: CPT

## 2020-05-15 NOTE — HISTORY OF PRESENT ILLNESS
[FreeTextEntry1] : Referred by Dr. Quinones\par \par 60-year-old male with past medical history of hypertension and multiple myeloma with bone metastatic disease who presents with shortness of breath for the last 3 weeks. Patient denies any fever or cough. He also complains of chest discomfort. EKG on initial consultation reveals sinus tachycardia at 126 beats per minute. Patient denies any edema\par He also complains of palpitations and lightheadedness dizziness. He denies syncope orthopnea or PND.\par He denies history of microinfarction, stroke, coronary intervention

## 2020-05-15 NOTE — ASSESSMENT
[FreeTextEntry1] : EKG May 15, 2020 sinus tachycardia\par \par 60-year-old male with hypertension and multiple myeloma with sinus tachycardia and shortness of breath and chest pain\par Recommended emergency room evaluation with chest x-ray to evaluate for metastatic disease as well as rib fracture versus pneumothorax versus a CT angiography for a pulmonary embolism\par blood pressure is controlled on amlodipine

## 2020-05-15 NOTE — PHYSICAL EXAM
[Normal Appearance] : normal appearance [General Appearance - Well Developed] : well developed [Well Groomed] : well groomed [No Deformities] : no deformities [General Appearance - Well Nourished] : well nourished [Normal Conjunctiva] : the conjunctiva exhibited no abnormalities [General Appearance - In No Acute Distress] : no acute distress [Eyelids - No Xanthelasma] : the eyelids demonstrated no xanthelasmas [Normal Oral Mucosa] : normal oral mucosa [No Oral Pallor] : no oral pallor [No Oral Cyanosis] : no oral cyanosis [Normal Jugular Venous A Waves Present] : normal jugular venous A waves present [Normal Jugular Venous V Waves Present] : normal jugular venous V waves present [No Jugular Venous Kauffman A Waves] : no jugular venous kauffman A waves [Heart Rate And Rhythm] : heart rate and rhythm were normal [Heart Sounds] : normal S1 and S2 [Murmurs] : no murmurs present [Exaggerated Use Of Accessory Muscles For Inspiration] : no accessory muscle use [Respiration, Rhythm And Depth] : normal respiratory rhythm and effort [Auscultation Breath Sounds / Voice Sounds] : lungs were clear to auscultation bilaterally [Abdomen Tenderness] : non-tender [Abdomen Soft] : soft [Abdomen Mass (___ Cm)] : no abdominal mass palpated [Gait - Sufficient For Exercise Testing] : the gait was sufficient for exercise testing [Abnormal Walk] : normal gait [Cyanosis, Localized] : no localized cyanosis [Petechial Hemorrhages (___cm)] : no petechial hemorrhages [Nail Clubbing] : no clubbing of the fingernails [Skin Color & Pigmentation] : normal skin color and pigmentation [Skin Lesions] : no skin lesions [No Venous Stasis] : no venous stasis [] : no rash [No Skin Ulcers] : no skin ulcer [No Xanthoma] : no  xanthoma was observed [Affect] : the affect was normal [Oriented To Time, Place, And Person] : oriented to person, place, and time [Mood] : the mood was normal [No Anxiety] : not feeling anxious [Tachycardic ___] : the heart rate was tachycardic at [unfilled] bpm

## 2020-05-18 VITALS
OXYGEN SATURATION: 100 % | RESPIRATION RATE: 18 BRPM | BODY MASS INDEX: 18.83 KG/M2 | SYSTOLIC BLOOD PRESSURE: 162 MMHG | HEIGHT: 67 IN | HEART RATE: 120 BPM | DIASTOLIC BLOOD PRESSURE: 100 MMHG | WEIGHT: 120 LBS

## 2020-05-18 NOTE — HISTORY OF PRESENT ILLNESS
[FreeTextEntry1] : Mr. Robert present today for OTV. He received 1 fraction to the C6 - T3 spine today. He had questions regarding potential benefits of radiations and the effect it may have on the disease in the spine. He is concerned about regaining his ability to ambulate and be flexible like he was prior to this recent set back. The patient wanted a guarantee that the radiation would improve his ambulation but we reminded him that we could not give that type of guarantee. His blood pressure is elevated today. He reports he took his medication this morning and had recent follow-up with cardiologist.

## 2020-05-18 NOTE — PHYSICAL EXAM
[Normal] : well developed, well nourished, in no acute distress [de-identified] : able to ambulate with a walker [Thin] : thin

## 2020-05-18 NOTE — REASON FOR VISIT
[Consideration for Non-Curative Therapy] : consideration for non-curative therapy for [Other: ___] : [unfilled] [Family Member] : family member

## 2020-05-18 NOTE — VITALS
[70: Cares for self; unalbe to carry on normal activity or do active work.] : 70: Cares for self; unable to carry on normal activity or do active work.

## 2020-05-19 ENCOUNTER — RESULT REVIEW (OUTPATIENT)
Age: 60
End: 2020-05-19

## 2020-05-19 ENCOUNTER — APPOINTMENT (OUTPATIENT)
Dept: HEMATOLOGY ONCOLOGY | Facility: CLINIC | Age: 60
End: 2020-05-19
Payer: MEDICAID

## 2020-05-19 VITALS
OXYGEN SATURATION: 97 % | HEART RATE: 119 BPM | BODY MASS INDEX: 18.99 KG/M2 | RESPIRATION RATE: 18 BRPM | TEMPERATURE: 98.4 F | DIASTOLIC BLOOD PRESSURE: 97 MMHG | SYSTOLIC BLOOD PRESSURE: 166 MMHG | WEIGHT: 121 LBS | HEIGHT: 67 IN

## 2020-05-19 PROCEDURE — 99214 OFFICE O/P EST MOD 30 MIN: CPT

## 2020-05-19 NOTE — HISTORY OF PRESENT ILLNESS
[de-identified] : Mr. Rolf Robert is 60 year old male with IgG Kappa multiple myeloma transferred care from Dr. Amy Vo. \par \par Patient initially diagnosed with Multiple myeloma in 2018 and received 3 cycles of RVD but developed a severe skin reaction and was switched to Pomalyst/Pomalidomide.   \par After 2 months he was recommended stem cell transplant-> he was completely against transplant and was lost to follow up\par \par In Feb 2020, he developed low back pain radiating down to both hips. CT Pelvis done demonstrating severe joint space narrowing involving the right hip, moderate osteophytic changes on the left. Abnormal appearance of osseous structures of the pelvis with multiple lytic foci throughout the pelvis with a permeative appearance of the cortex of bilateral iliac bones. Mild compression deformity at L4 age indeterminant noted. \par B/l hips pain persisted with 3/2020 X-ray of pelvis noted multifocal lytic lesions throughout the bone.  PT was then by neurosurgery on 3/30/20 who recommended rad onc with MRI on 3/31/20 noted moderate compression fracture deformity involving C7 vertebral body with retropulsion of bone and mild bony central canal stenosis without cord compression or epidural extension. A completely collapsed T2 vertebral body with mild retropulsion of bone into spinal canal with moderate central canal stenosis with indentation of the ventral cord. No significant epidural tumor extension present and no edema noted within the adjacent spinal cord. Moderate pathologic compression fracture of T6 without retropulsion of bone/cord compression. Mild compression fracture of T7 without retropulsion of bone. Mild of T8 with mild retropulsion of bone and mild central canal stenosis. Moderate pathologic compression fracture of T10 without retropulsion/compression. Minimal pathologic compression of inferior endplate of T11. Mild pathologic compression involving T12 and L3. Moderate pathologic compression fracture involving L4 stable compared to prior CT. Mild retropulsion of bone present.\par \par Patient had bone marrow biopsy performed among other MM tests which demonstrated plasma cell neoplasm > 70%. \par Hyperdiploidy and gains of chromosomes 3, 5, 9, 11, 15 and 19, as well as, rearrangements of 1p  are recurrently seen in plasma cell disorders.  Complex karyotypic changes are generally associated with an unfavorable clinical course\par FISH myeloma-  Three copies of CCND1 detected (15.5%)\par \par 4/2/20 skeletal survey demonstrated scattered lytic lesions in the calvarium, spine, and pelvis without fracture or dislocation. Multilevel spondylosis and vertebral wedge compression deformities noted.\par \par Patient was then seen by radiation oncologist Dr. Rica Villalpando on 4/24/20.\par \par  [de-identified] : He is seen today for follow and daratumumab kyprolis dexamethasone # 3 (5/5/20-present)\par \par Pt reports of being frustrated and anxiety that he is yet able to feel better clinically, still relying on his walker, and not gaining weight despite having good appetite. He is now 120lbs, baseline is 140-150 lbs. Pt has not been taking his amlodipine due to not knowing where his medication is along with his Oxycodone bottle. He lives with his sister.  Pt still has intermittent right rib and low back pain.  \par Bone pain unchanged -  has seen Dr Roberson (rad onc) and is not sure if he wants to pursue radiation. I explained again the benefits\par \par

## 2020-05-19 NOTE — PHYSICAL EXAM
[Ambulatory and capable of all self care but unable to carry out any work activities] : Status 2- Ambulatory and capable of all self care but unable to carry out any work activities. Up and about more than 50% of waking hours [Normal] : normal appearance, no rash, nodules, vesicles, ulcers, erythema [Thin] : thin [de-identified] : +walks with walker

## 2020-05-19 NOTE — RESULTS/DATA
[FreeTextEntry1] : 5/5/2020\par \par FLCR - 146.35\par IgG - 6247\par Kappa light chain - 55.83\par \par 5/5/20\par M spike - 5.1

## 2020-05-19 NOTE — ASSESSMENT
[FreeTextEntry1] : Relapsed IgG Kappa multiple myeloma \par Diagnosed 2018 s/p RVD x 3 cycles.\par Developed severe skin reaction to revlimid and was supposed to be switched ot pomalyst.\par However did not want stem cell transplant and decided to stop follow up\par Presented Feb 2020 with low back pain found to have diffuse bone metastases\par Bone marrow (3/20): > 70% clonal plasma cells\par Hyperdiploidy and gains of chromosomes 3, 5, 9, 11, 15 and 19, as well as, rearrangements of 1p  are recurrently seen in plasma cell disorders.  Complex karyotypic changes are generally associated with an unfavorable clinical course\par FISH myeloma-  Three copies of CCND1 detected (15.5%)\par \par Here for daratumumab kyprolis dexamethasone #3 ( 5/5/20-present)\par Tolerated well\par Labs reviewed and discussed\par Hgb improving\par Proceed with cheom\par Pt and family members advised to take Acyclovir 400mg bid. \par \par Anemia\par Likely from multiple myeloma with a component of B12 deficiency\par Will give B12 1000 mcg with chemo today\par Start B12 sublingual daily\par \par Lower and upper back pain \par Bone metastases\par Saw rad onc = Pt refusing radiation\par to take Oxycodone 5mg prn.\par \par HTN - Pt, sister, and niece advised on having patient taking Amlodipine 5mg daily. to monitor blood pressure at home.\par \par Medications: Pt and niece given written instructions on how and being compliant with his medications  - Oxycodone, Amlodipine, B12, acyclovir. \par \par Nutrition - high calories and frequent advised. pt's seen and follow by dietician Ashleigh Mccloud.\par d/w Dr. Quinones \par \par Contact\par Nadia Robert (sister) - [phone]\par \par Next myeloma panel to be sent June 2, 2020\par Follow up in 1 week for DKd # 3\par CBC, CMP

## 2020-05-26 ENCOUNTER — RESULT REVIEW (OUTPATIENT)
Age: 60
End: 2020-05-26

## 2020-05-26 ENCOUNTER — APPOINTMENT (OUTPATIENT)
Dept: HEMATOLOGY ONCOLOGY | Facility: CLINIC | Age: 60
End: 2020-05-26
Payer: MEDICAID

## 2020-05-26 VITALS
DIASTOLIC BLOOD PRESSURE: 80 MMHG | RESPIRATION RATE: 18 BRPM | OXYGEN SATURATION: 100 % | HEIGHT: 66.97 IN | HEART RATE: 120 BPM | BODY MASS INDEX: 19.46 KG/M2 | TEMPERATURE: 98.7 F | WEIGHT: 124 LBS | SYSTOLIC BLOOD PRESSURE: 115 MMHG

## 2020-05-26 PROCEDURE — 99214 OFFICE O/P EST MOD 30 MIN: CPT

## 2020-05-27 NOTE — ASSESSMENT
[FreeTextEntry1] : Relapsed IgG Kappa multiple myeloma \par Diagnosed 2018 s/p RVD x 3 cycles.\par Developed severe skin reaction to revlimid and was supposed to be switched ot pomalyst.\par However did not want stem cell transplant and decided to stop follow up\par Presented Feb 2020 with low back pain found to have diffuse bone metastases\par Bone marrow (3/20): > 70% clonal plasma cells\par Hyperdiploidy and gains of chromosomes 3, 5, 9, 11, 15 and 19, as well as, rearrangements of 1p  are recurrently seen in plasma cell disorders.  Complex karyotypic changes are generally associated with an unfavorable clinical course\par FISH myeloma-  Three copies of CCND1 detected (15.5%)\par \par Here for daratumumab kyprolis dexamethasone #4 ( 5/5/20-present)\par Tolerated well\par Labs reviewed and discussed\par Hgb improving\par Proceed with treatment\par Pt and family members advised to take Acyclovir 400mg bid. \par \par Anemia\par Likely from multiple myeloma with a component of B12 deficiency\par continue with B12 sublingual daily\par \par Lower and upper back pain  - nikki to walks better \par Bone metastases\par Saw rad onc = Pt refusing radiation\par continue with Oxycodone 5mg prn.\par \par HTN - controlled with Amlodipine 5mg daily. to monitor blood pressure at home.\par \par Medications: Pt and niece given written instructions on how and being compliant with his medications  - Oxycodone, Amlodipine, B12, acyclovir. \par \par Nutrition - high calories and frequent advised. pt's seen and follow by dietician Ashleigh Mccloud.\par d/w Dr. Quinones \par \par Contact\par Nadia Robert (sister) - [phone]\par \par Next myeloma panel to be sent June 2, 2020\par Follow up in 1 week for DKd # 3\par CBC, CMP

## 2020-05-27 NOTE — HISTORY OF PRESENT ILLNESS
[de-identified] : Mr. Rolf Robert is 60 year old male with IgG Kappa multiple myeloma transferred care from Dr. Amy Vo. \par \par Patient initially diagnosed with Multiple myeloma in 2018 and received 3 cycles of RVD but developed a severe skin reaction and was switched to Pomalyst/Pomalidomide.   \par After 2 months he was recommended stem cell transplant-> he was completely against transplant and was lost to follow up\par \par In Feb 2020, he developed low back pain radiating down to both hips. CT Pelvis done demonstrating severe joint space narrowing involving the right hip, moderate osteophytic changes on the left. Abnormal appearance of osseous structures of the pelvis with multiple lytic foci throughout the pelvis with a permeative appearance of the cortex of bilateral iliac bones. Mild compression deformity at L4 age indeterminant noted. \par B/l hips pain persisted with 3/2020 X-ray of pelvis noted multifocal lytic lesions throughout the bone.  PT was then by neurosurgery on 3/30/20 who recommended rad onc with MRI on 3/31/20 noted moderate compression fracture deformity involving C7 vertebral body with retropulsion of bone and mild bony central canal stenosis without cord compression or epidural extension. A completely collapsed T2 vertebral body with mild retropulsion of bone into spinal canal with moderate central canal stenosis with indentation of the ventral cord. No significant epidural tumor extension present and no edema noted within the adjacent spinal cord. Moderate pathologic compression fracture of T6 without retropulsion of bone/cord compression. Mild compression fracture of T7 without retropulsion of bone. Mild of T8 with mild retropulsion of bone and mild central canal stenosis. Moderate pathologic compression fracture of T10 without retropulsion/compression. Minimal pathologic compression of inferior endplate of T11. Mild pathologic compression involving T12 and L3. Moderate pathologic compression fracture involving L4 stable compared to prior CT. Mild retropulsion of bone present.\par \par Patient had bone marrow biopsy performed among other MM tests which demonstrated plasma cell neoplasm > 70%. \par Hyperdiploidy and gains of chromosomes 3, 5, 9, 11, 15 and 19, as well as, rearrangements of 1p  are recurrently seen in plasma cell disorders.  Complex karyotypic changes are generally associated with an unfavorable clinical course\par FISH myeloma-  Three copies of CCND1 detected (15.5%)\par \par 4/2/20 skeletal survey demonstrated scattered lytic lesions in the calvarium, spine, and pelvis without fracture or dislocation. Multilevel spondylosis and vertebral wedge compression deformities noted.\par \par Patient was then seen by radiation oncologist Dr. Rica Villalpando on 4/24/20.\par \par  [de-identified] : He is seen today for follow and daratumumab kyprolis dexamethasone # 4 (5/5/20-present)\par \par Pt is still frustrated that he's physically not as strong as he wants to be and continues to have pain on his low back and right ribs but he's been able to walk better and farther without his walker at time. Patient is only takign Oxycodone 5mg prn.  His blood pressure has been under control with taking Amlodipine daily.  His appetite remains good and he gained a few lbs in the last week.  Pt denies n/v, fever, headaches, dizziness, chest pain/palpitation, rash, bleeding, SOB/HILLS. \par \par Bone pain unchanged -  has seen Dr Roberson (rad onc) and is not sure if he wants to pursue radiation. I explained again the benefits\par \par Weight = 120->121->now with 124lbs. \par \par

## 2020-05-27 NOTE — PHYSICAL EXAM
[Ambulatory and capable of all self care but unable to carry out any work activities] : Status 2- Ambulatory and capable of all self care but unable to carry out any work activities. Up and about more than 50% of waking hours [Thin] : thin [Normal] : normal appearance, no rash, nodules, vesicles, ulcers, erythema [de-identified] : +walks with walker

## 2020-06-02 ENCOUNTER — RESULT REVIEW (OUTPATIENT)
Age: 60
End: 2020-06-02

## 2020-06-02 ENCOUNTER — APPOINTMENT (OUTPATIENT)
Dept: HEMATOLOGY ONCOLOGY | Facility: CLINIC | Age: 60
End: 2020-06-02
Payer: MEDICAID

## 2020-06-02 VITALS
TEMPERATURE: 98.6 F | BODY MASS INDEX: 20.09 KG/M2 | DIASTOLIC BLOOD PRESSURE: 80 MMHG | RESPIRATION RATE: 18 BRPM | OXYGEN SATURATION: 99 % | WEIGHT: 128 LBS | SYSTOLIC BLOOD PRESSURE: 145 MMHG | HEART RATE: 120 BPM | HEIGHT: 66.97 IN

## 2020-06-02 PROCEDURE — 99215 OFFICE O/P EST HI 40 MIN: CPT

## 2020-06-02 NOTE — PHYSICAL EXAM
[Ambulatory and capable of all self care but unable to carry out any work activities] : Status 2- Ambulatory and capable of all self care but unable to carry out any work activities. Up and about more than 50% of waking hours [Thin] : thin [Normal] : normal appearance, no rash, nodules, vesicles, ulcers, erythema [de-identified] : +walks with walker

## 2020-06-02 NOTE — HISTORY OF PRESENT ILLNESS
[de-identified] : Mr. Rolf Robert is 60 year old male with IgG Kappa multiple myeloma transferred care from Dr. Amy Vo. \par \par Patient initially diagnosed with Multiple myeloma in 2018 and received 3 cycles of RVD but developed a severe skin reaction and was switched to Pomalyst/Pomalidomide.   \par After 2 months he was recommended stem cell transplant-> he was completely against transplant and was lost to follow up\par \par In Feb 2020, he developed low back pain radiating down to both hips. CT Pelvis done demonstrating severe joint space narrowing involving the right hip, moderate osteophytic changes on the left. Abnormal appearance of osseous structures of the pelvis with multiple lytic foci throughout the pelvis with a permeative appearance of the cortex of bilateral iliac bones. Mild compression deformity at L4 age indeterminant noted. \par B/l hips pain persisted with 3/2020 X-ray of pelvis noted multifocal lytic lesions throughout the bone.  PT was then by neurosurgery on 3/30/20 who recommended rad onc with MRI on 3/31/20 noted moderate compression fracture deformity involving C7 vertebral body with retropulsion of bone and mild bony central canal stenosis without cord compression or epidural extension. A completely collapsed T2 vertebral body with mild retropulsion of bone into spinal canal with moderate central canal stenosis with indentation of the ventral cord. No significant epidural tumor extension present and no edema noted within the adjacent spinal cord. Moderate pathologic compression fracture of T6 without retropulsion of bone/cord compression. Mild compression fracture of T7 without retropulsion of bone. Mild of T8 with mild retropulsion of bone and mild central canal stenosis. Moderate pathologic compression fracture of T10 without retropulsion/compression. Minimal pathologic compression of inferior endplate of T11. Mild pathologic compression involving T12 and L3. Moderate pathologic compression fracture involving L4 stable compared to prior CT. Mild retropulsion of bone present.\par \par Patient had bone marrow biopsy performed among other MM tests which demonstrated plasma cell neoplasm > 70%. \par Hyperdiploidy and gains of chromosomes 3, 5, 9, 11, 15 and 19, as well as, rearrangements of 1p  are recurrently seen in plasma cell disorders.  Complex karyotypic changes are generally associated with an unfavorable clinical course\par FISH myeloma-  Three copies of CCND1 detected (15.5%)\par \par 4/2/20 skeletal survey demonstrated scattered lytic lesions in the calvarium, spine, and pelvis without fracture or dislocation. Multilevel spondylosis and vertebral wedge compression deformities noted.\par \par Patient was then seen by radiation oncologist Dr. Rica Villalpando on 4/24/20.\par \par  [de-identified] : He is seen today for follow and daratumumab kyprolis dexamethasone # 5 (5/5/20-present)\par \par He is anxious about the cancer coming back\par Explained that overall he is doing better than before he started the treatment-> He has gained 8 lbs, Hgb improved to ~10, total protein declining.\par Paraproteins sent today and will be reviewed next week\par \par He is not walking independently without walker/ cane\par \par Weight = 120->121-> 124 lbs -> 128 . \par \par

## 2020-06-02 NOTE — ASSESSMENT
[FreeTextEntry1] : Relapsed IgG Kappa multiple myeloma \par Diagnosed 2018 s/p RVD x 3 cycles.\par Developed severe skin reaction to revlimid and was supposed to be switched ot pomalyst.\par However did not want stem cell transplant and decided to stop follow up\par Presented Feb 2020 with low back pain found to have diffuse bone metastases\par Bone marrow (3/20): > 70% clonal plasma cells\par Hyperdiploidy and gains of chromosomes 3, 5, 9, 11, 15 and 19, as well as, rearrangements of 1p  are recurrently seen in plasma cell disorders.  Complex karyotypic changes are generally associated with an unfavorable clinical course\par FISH myeloma-  Three copies of CCND1 detected (15.5%)\par \par Here for daratumumab kyprolis dexamethasone #5 ( 5/5/20-present)\par Tolerating well\par Overall has improvement in clnical and lab parameters\par Labs reviewed and discussed\par Paraproteins pending. Will follow. Expect to be better\par Proceed with treatment\par Acyclovir 400mg bid. \par \par Anemia\par Likely from multiple myeloma with a component of B12 deficiency\par continue with B12 sublingual daily\par \par Lower and upper back pain \par Bone metastases\par Radiation\par continue with Oxycodone 5mg prn.\par Bone agent\par \par HTN - controlled with Amlodipine 5mg daily. to monitor blood pressure at home.\par \par Nutrition - high calories and frequent advised. pt's seen and follow by dietician Ashleigh Mccloud.\par \par Emotional counselling provided\par \par Follow up in 1 week\par CBC, CMP\par \par Contact\par Nadia Robert (sister) - [phone]\par \par

## 2020-06-02 NOTE — RESULTS/DATA
[FreeTextEntry1] : Labs reviewed and discussed\par \par FLCR - 146.35\par IgG - 6247\par Kappa light chain - 55.83\par M spike - 5.1

## 2020-06-09 ENCOUNTER — RESULT REVIEW (OUTPATIENT)
Age: 60
End: 2020-06-09

## 2020-06-09 ENCOUNTER — APPOINTMENT (OUTPATIENT)
Dept: HEMATOLOGY ONCOLOGY | Facility: CLINIC | Age: 60
End: 2020-06-09
Payer: MEDICAID

## 2020-06-09 VITALS
BODY MASS INDEX: 19.78 KG/M2 | TEMPERATURE: 99 F | HEART RATE: 120 BPM | RESPIRATION RATE: 18 BRPM | DIASTOLIC BLOOD PRESSURE: 102 MMHG | WEIGHT: 126 LBS | SYSTOLIC BLOOD PRESSURE: 149 MMHG | HEIGHT: 66.97 IN | OXYGEN SATURATION: 100 %

## 2020-06-09 PROCEDURE — 99215 OFFICE O/P EST HI 40 MIN: CPT

## 2020-06-09 NOTE — RESULTS/DATA
[FreeTextEntry1] : Labs reviewed and discussed\par \par FLCR - 146.35 -> 3.32\par IgG - 6247 ->  1315\par Kappa light chain - 55.83 -> 1.03\par M spike - 5.1 -> 1

## 2020-06-09 NOTE — HISTORY OF PRESENT ILLNESS
[de-identified] : Mr. Rolf Robert is 60 year old male with IgG Kappa multiple myeloma transferred care from Dr. Amy Vo. \par \par Patient initially diagnosed with Multiple myeloma in 2018 and received 3 cycles of RVD but developed a severe skin reaction and was switched to Pomalyst/Pomalidomide.   \par After 2 months he was recommended stem cell transplant-> he was completely against transplant and was lost to follow up\par \par In Feb 2020, he developed low back pain radiating down to both hips. CT Pelvis done demonstrating severe joint space narrowing involving the right hip, moderate osteophytic changes on the left. Abnormal appearance of osseous structures of the pelvis with multiple lytic foci throughout the pelvis with a permeative appearance of the cortex of bilateral iliac bones. Mild compression deformity at L4 age indeterminant noted. \par B/l hips pain persisted with 3/2020 X-ray of pelvis noted multifocal lytic lesions throughout the bone.  PT was then by neurosurgery on 3/30/20 who recommended rad onc with MRI on 3/31/20 noted moderate compression fracture deformity involving C7 vertebral body with retropulsion of bone and mild bony central canal stenosis without cord compression or epidural extension. A completely collapsed T2 vertebral body with mild retropulsion of bone into spinal canal with moderate central canal stenosis with indentation of the ventral cord. No significant epidural tumor extension present and no edema noted within the adjacent spinal cord. Moderate pathologic compression fracture of T6 without retropulsion of bone/cord compression. Mild compression fracture of T7 without retropulsion of bone. Mild of T8 with mild retropulsion of bone and mild central canal stenosis. Moderate pathologic compression fracture of T10 without retropulsion/compression. Minimal pathologic compression of inferior endplate of T11. Mild pathologic compression involving T12 and L3. Moderate pathologic compression fracture involving L4 stable compared to prior CT. Mild retropulsion of bone present.\par \par Patient had bone marrow biopsy performed among other MM tests which demonstrated plasma cell neoplasm > 70%. \par Hyperdiploidy and gains of chromosomes 3, 5, 9, 11, 15 and 19, as well as, rearrangements of 1p  are recurrently seen in plasma cell disorders.  Complex karyotypic changes are generally associated with an unfavorable clinical course\par FISH myeloma-  Three copies of CCND1 detected (15.5%)\par \par 4/2/20 skeletal survey demonstrated scattered lytic lesions in the calvarium, spine, and pelvis without fracture or dislocation. Multilevel spondylosis and vertebral wedge compression deformities noted.\par \par Patient was then seen by radiation oncologist Dr. Rica Villalpando on 4/24/20.\par \par  [de-identified] : He is seen today for follow and daratumumab kyprolis dexamethasone # 6 (5/5/20-present)\par \par He feels good overall\par Co weight loss\par Explained that he has gained weight since the beginning of the treatment and he has to keep eating and working out as much as possible\par \par Also co pain around ribs. Oxycodone two times a day help\par \par Weight = 120->121-> 124 lbs -> 128 -> 126 lbs \par \par

## 2020-06-09 NOTE — PHYSICAL EXAM
[Ambulatory and capable of all self care but unable to carry out any work activities] : Status 2- Ambulatory and capable of all self care but unable to carry out any work activities. Up and about more than 50% of waking hours [Thin] : thin [Normal] : grossly intact

## 2020-06-09 NOTE — ASSESSMENT
[FreeTextEntry1] : Relapsed IgG Kappa multiple myeloma \par Diagnosed 2018 s/p RVD x 3 cycles.\par Developed severe skin reaction to revlimid and was supposed to be switched ot pomalyst.\par However did not want stem cell transplant and decided to stop follow up\par Presented Feb 2020 with low back pain found to have diffuse bone metastases\par Bone marrow (3/20): > 70% clonal plasma cells\par Hyperdiploidy and gains of chromosomes 3, 5, 9, 11, 15 and 19, as well as, rearrangements of 1p  are recurrently seen in plasma cell disorders.  Complex karyotypic changes are generally associated with an unfavorable clinical course\par FISH myeloma-  Three copies of CCND1 detected (15.5%)\par \par Here for daratumumab kyprolis dexamethasone #6 ( 5/5/20-present)\par Tolerating well\par Overall has improvement in clnical and lab parameters\par Labs reviewed and discussed\par Paraproteins from last week show excellent response in SPEP, qIgGs, FLCR\par Proceed with treatment\par Acyclovir 400mg bid. \par \par Anemia\par Likely from multiple myeloma with a component of B12 deficiency\par continue with B12 sublingual daily\par Counts gradually improving\par \par Lower and upper back pain \par Bone metastases\par Completed Radiation C6-T3 spine\par continue with Oxycodone 5mg prn.\par Bone agent\par May need PET/CT if new sites of pain\par \par HTN - controlled with Amlodipine 5mg daily. to monitor blood pressure at home.\par \par Nutrition - high calories and frequent advised. pt's seen and follow by dietician Ashleigh Mccloud.\par \par Follow up in 1 week\par CBC, CMP\par \par Contact\par Nadia Robert (sister) - [phone]\par \par

## 2020-06-15 NOTE — PHYSICAL EXAM
[Ambulatory and capable of all self care but unable to carry out any work activities] : Status 2- Ambulatory and capable of all self care but unable to carry out any work activities. Up and about more than 50% of waking hours [Thin] : thin [Normal] : normal appearance, no rash, nodules, vesicles, ulcers, erythema

## 2020-06-16 ENCOUNTER — APPOINTMENT (OUTPATIENT)
Dept: HEMATOLOGY ONCOLOGY | Facility: CLINIC | Age: 60
End: 2020-06-16
Payer: MEDICAID

## 2020-06-16 ENCOUNTER — RESULT REVIEW (OUTPATIENT)
Age: 60
End: 2020-06-16

## 2020-06-16 VITALS
HEART RATE: 111 BPM | BODY MASS INDEX: 19.87 KG/M2 | HEIGHT: 67 IN | OXYGEN SATURATION: 100 % | RESPIRATION RATE: 18 BRPM | DIASTOLIC BLOOD PRESSURE: 98 MMHG | TEMPERATURE: 98.6 F | WEIGHT: 126.6 LBS | SYSTOLIC BLOOD PRESSURE: 157 MMHG

## 2020-06-16 PROCEDURE — 99214 OFFICE O/P EST MOD 30 MIN: CPT

## 2020-06-16 NOTE — HISTORY OF PRESENT ILLNESS
[de-identified] : Mr. Rolf Robert is 60 year old male with IgG Kappa multiple myeloma transferred care from Dr. Amy Vo. \par \par Patient initially diagnosed with Multiple myeloma in 2018 and received 3 cycles of RVD but developed a severe skin reaction and was switched to Pomalyst/Pomalidomide.   \par After 2 months he was recommended stem cell transplant-> he was completely against transplant and was lost to follow up\par \par In Feb 2020, he developed low back pain radiating down to both hips. CT Pelvis done demonstrating severe joint space narrowing involving the right hip, moderate osteophytic changes on the left. Abnormal appearance of osseous structures of the pelvis with multiple lytic foci throughout the pelvis with a permeative appearance of the cortex of bilateral iliac bones. Mild compression deformity at L4 age indeterminant noted. \par B/l hips pain persisted with 3/2020 X-ray of pelvis noted multifocal lytic lesions throughout the bone.  PT was then by neurosurgery on 3/30/20 who recommended rad onc with MRI on 3/31/20 noted moderate compression fracture deformity involving C7 vertebral body with retropulsion of bone and mild bony central canal stenosis without cord compression or epidural extension. A completely collapsed T2 vertebral body with mild retropulsion of bone into spinal canal with moderate central canal stenosis with indentation of the ventral cord. No significant epidural tumor extension present and no edema noted within the adjacent spinal cord. Moderate pathologic compression fracture of T6 without retropulsion of bone/cord compression. Mild compression fracture of T7 without retropulsion of bone. Mild of T8 with mild retropulsion of bone and mild central canal stenosis. Moderate pathologic compression fracture of T10 without retropulsion/compression. Minimal pathologic compression of inferior endplate of T11. Mild pathologic compression involving T12 and L3. Moderate pathologic compression fracture involving L4 stable compared to prior CT. Mild retropulsion of bone present.\par \par Patient had bone marrow biopsy performed among other MM tests which demonstrated plasma cell neoplasm > 70%. \par Hyperdiploidy and gains of chromosomes 3, 5, 9, 11, 15 and 19, as well as, rearrangements of 1p  are recurrently seen in plasma cell disorders.  Complex karyotypic changes are generally associated with an unfavorable clinical course\par FISH myeloma-  Three copies of CCND1 detected (15.5%)\par \par 4/2/20 skeletal survey demonstrated scattered lytic lesions in the calvarium, spine, and pelvis without fracture or dislocation. Multilevel spondylosis and vertebral wedge compression deformities noted.\par \par Patient was then seen by radiation oncologist Dr. Rica Villalpando on 4/24/20.\par \par  [de-identified] : He is seen today for follow and daratumumab kyprolis dexamethasone # 7 (5/5/20-present)\par \par Pt reports persistent chronic right shoulder, right ribs, and low back pain. He's taking Oxycodone 5mg prn, around 3 a day but nikki to walk further distances without his walker. Patient is worried and frustrated that he cannot go back to his baseline weight of 140 when he's having good appetite. Pt did, however, gained up to 6 lbs since started treatment with us in May 2020. He denies n/v, fever, headaches, dizziness, chest pain/palpitation, rash, bleeding, SOB. \par \par Weight = 120->121-> 124 lbs -> 128 -> 126 lbs \par \par

## 2020-06-16 NOTE — ASSESSMENT
[FreeTextEntry1] : Relapsed IgG Kappa multiple myeloma \par Diagnosed 2018 s/p RVD x 3 cycles.\par Developed severe skin reaction to revlimid and was supposed to be switched ot pomalyst.\par However did not want stem cell transplant and decided to stop follow up\par Presented Feb 2020 with low back pain found to have diffuse bone metastases\par Bone marrow (3/20): > 70% clonal plasma cells\par Hyperdiploidy and gains of chromosomes 3, 5, 9, 11, 15 and 19, as well as, rearrangements of 1p  are recurrently seen in plasma cell disorders.  Complex karyotypic changes are generally associated with an unfavorable clinical course\par FISH myeloma-  Three copies of CCND1 detected (15.5%)\par \par Here for daratumumab kyprolis dexamethasone #7 ( 5/5/20-present)\par Tolerating well\par Overall has improvement in clinical and lab parameters\par Labs reviewed and discussed\par Paraproteins from last week show excellent response in SPEP, qIgGs, FLCR\par Proceed with treatment\par Acyclovir 400mg bid. \par Labs improved \par \par Anemia\par Likely from multiple myeloma with a component of B12 deficiency\par continue with B12 sublingual daily\par Counts gradually improving\par \par Lower and upper back pain \par Bone metastases\par Completed Radiation C6-T3 spine\par continue with Oxycodone 5mg prn.\par Bone agent\par May need PET/CT if new sites of pain\par \par HTN - controlled with Amlodipine 5mg daily. to monitor blood pressure at home.\par \par Nutrition - high calories and frequent advised. pt's seen and follow by dietician Ashleigh Mccloud.\par d/w Dr. Quinones \par Follow up in 1 week\par CBC, CMP\par \par Contact\par Nadia Robert (sister) - [phone]\par \par

## 2020-06-16 NOTE — RESULTS/DATA
[FreeTextEntry1] : Labs reviewed and discussed\par \par FLCR - 146.35 -> 3.32\par IgG - 6247 ->  1315\par Kappa light chain - 55.83 -> 1.03\par M spike - 5.1 -> 1\par \par 6/16/20\par wbc 5.2\par Hgb 11.2 \par Hct 36.9\par Plts 435

## 2020-06-24 ENCOUNTER — APPOINTMENT (OUTPATIENT)
Dept: HEMATOLOGY ONCOLOGY | Facility: CLINIC | Age: 60
End: 2020-06-24
Payer: MEDICAID

## 2020-06-24 ENCOUNTER — RESULT REVIEW (OUTPATIENT)
Age: 60
End: 2020-06-24

## 2020-06-24 VITALS
HEIGHT: 67 IN | DIASTOLIC BLOOD PRESSURE: 90 MMHG | TEMPERATURE: 98.7 F | OXYGEN SATURATION: 98 % | SYSTOLIC BLOOD PRESSURE: 140 MMHG | HEART RATE: 110 BPM | RESPIRATION RATE: 18 BRPM | WEIGHT: 124 LBS | BODY MASS INDEX: 19.46 KG/M2

## 2020-06-24 PROCEDURE — 99214 OFFICE O/P EST MOD 30 MIN: CPT

## 2020-06-24 NOTE — RESULTS/DATA
[FreeTextEntry1] : Labs reviewed and discussed\par \par FLCR - 146.35 -> 3.32\par IgG - 6247 ->  1315\par Kappa light chain - 55.83 -> 1.03\par M spike - 5.1 -> 1\par \par

## 2020-06-24 NOTE — ASSESSMENT
[FreeTextEntry1] : Relapsed IgG Kappa multiple myeloma \par Diagnosed 2018 s/p RVD x 3 cycles.\par Developed severe skin reaction to revlimid and was supposed to be switched ot pomalyst.\par However did not want stem cell transplant and decided to stop follow up\par Presented Feb 2020 with low back pain found to have diffuse bone metastases\par Bone marrow (3/20): > 70% clonal plasma cells\par Hyperdiploidy and gains of chromosomes 3, 5, 9, 11, 15 and 19, as well as, rearrangements of 1p  are recurrently seen in plasma cell disorders.  Complex karyotypic changes are generally associated with an unfavorable clinical course\par FISH myeloma-  Three copies of CCND1 detected (15.5%)\par \par Here for daratumumab kyprolis dexamethasone #8 ( 5/5/20-present)\par Tolerating well\par Overall has improvement in clinical and lab parameters\par Labs reviewed and discussed\par Paraproteins from last week show excellent response in SPEP, qIgGs, FLCR\par Proceed with treatment\par Acyclovir 400mg bid. \par Labs improved \par \par No treatment today - Covid testing done - pt to have treatment on 6/26/20.\par \par Anemia\par Likely from multiple myeloma with a component of B12 deficiency\par continue with B12 sublingual daily\par Counts gradually improving\par \par Lower and upper back pain \par Bone metastases\par Completed Radiation C6-T3 spine, completed 5/20/20\par continue with Oxycodone 5mg prn.\par - Will speak to patient regarding receiving bone agent emdcation.\par May need PET/CT if new sites of pain\par \par HTN - controlled with Amlodipine 5mg daily. to monitor blood pressure at home.\par \par Nutrition - high calories and frequent advised. pt's seen and follow by dietician Ashleigh Mccloud.\par d/w Dr. Quinones \par Follow up in 1 week\par CBC, CMP\par \par Contact\par Nadia Robert (sister) - [phone]\par \par

## 2020-06-24 NOTE — HISTORY OF PRESENT ILLNESS
[de-identified] : Mr. Rolf Robert is 60 year old male with IgG Kappa multiple myeloma transferred care from Dr. Amy Vo. \par \par Patient initially diagnosed with Multiple myeloma in 2018 and received 3 cycles of RVD but developed a severe skin reaction and was switched to Pomalyst/Pomalidomide.   \par After 2 months he was recommended stem cell transplant-> he was completely against transplant and was lost to follow up\par \par In Feb 2020, he developed low back pain radiating down to both hips. CT Pelvis done demonstrating severe joint space narrowing involving the right hip, moderate osteophytic changes on the left. Abnormal appearance of osseous structures of the pelvis with multiple lytic foci throughout the pelvis with a permeative appearance of the cortex of bilateral iliac bones. Mild compression deformity at L4 age indeterminant noted. \par B/l hips pain persisted with 3/2020 X-ray of pelvis noted multifocal lytic lesions throughout the bone.  PT was then by neurosurgery on 3/30/20 who recommended rad onc with MRI on 3/31/20 noted moderate compression fracture deformity involving C7 vertebral body with retropulsion of bone and mild bony central canal stenosis without cord compression or epidural extension. A completely collapsed T2 vertebral body with mild retropulsion of bone into spinal canal with moderate central canal stenosis with indentation of the ventral cord. No significant epidural tumor extension present and no edema noted within the adjacent spinal cord. Moderate pathologic compression fracture of T6 without retropulsion of bone/cord compression. Mild compression fracture of T7 without retropulsion of bone. Mild of T8 with mild retropulsion of bone and mild central canal stenosis. Moderate pathologic compression fracture of T10 without retropulsion/compression. Minimal pathologic compression of inferior endplate of T11. Mild pathologic compression involving T12 and L3. Moderate pathologic compression fracture involving L4 stable compared to prior CT. Mild retropulsion of bone present.\par \par Patient had bone marrow biopsy performed among other MM tests which demonstrated plasma cell neoplasm > 70%. \par Hyperdiploidy and gains of chromosomes 3, 5, 9, 11, 15 and 19, as well as, rearrangements of 1p  are recurrently seen in plasma cell disorders.  Complex karyotypic changes are generally associated with an unfavorable clinical course\par FISH myeloma-  Three copies of CCND1 detected (15.5%)\par \par 4/2/20 skeletal survey demonstrated scattered lytic lesions in the calvarium, spine, and pelvis without fracture or dislocation. Multilevel spondylosis and vertebral wedge compression deformities noted.\par \par Patient was then seen by radiation oncologist Dr. Rica Villalpando on 4/24/20.\par \par  [de-identified] : He is seen today for follow and daratumumab kyprolis dexamethasone # 8 (5/5/20-present)\par \par Pt is here reports of chronic right rib and low back pain but pain has improved, has not taking any Oxycodone today and has not been using his walker. He is here for Covid testing so treatment will be delayed for 6/26/20.  \par \par  He denies n/v, fever, headaches, dizziness, chest pain/palpitation, rash, bleeding, SOB. \par \par Weight = 120->121-> 124 lbs -> 128 -> 126 lbs -->124\par \par

## 2020-07-02 ENCOUNTER — RESULT REVIEW (OUTPATIENT)
Age: 60
End: 2020-07-02

## 2020-07-02 ENCOUNTER — APPOINTMENT (OUTPATIENT)
Dept: HEMATOLOGY ONCOLOGY | Facility: CLINIC | Age: 60
End: 2020-07-02
Payer: MEDICAID

## 2020-07-02 VITALS
OXYGEN SATURATION: 98 % | BODY MASS INDEX: 19.93 KG/M2 | WEIGHT: 127 LBS | DIASTOLIC BLOOD PRESSURE: 95 MMHG | RESPIRATION RATE: 18 BRPM | TEMPERATURE: 98.7 F | SYSTOLIC BLOOD PRESSURE: 159 MMHG | HEART RATE: 102 BPM | HEIGHT: 67 IN

## 2020-07-02 PROCEDURE — 99214 OFFICE O/P EST MOD 30 MIN: CPT

## 2020-07-02 NOTE — ASSESSMENT
[FreeTextEntry1] : Relapsed IgG Kappa multiple myeloma \par Diagnosed 2018 s/p RVD x 3 cycles.\par Developed severe skin reaction to revlimid and was supposed to be switched ot pomalyst.\par However did not want stem cell transplant and decided to stop follow up\par Presented Feb 2020 with low back pain found to have diffuse bone metastases\par Bone marrow (3/20): > 70% clonal plasma cells\par Hyperdiploidy and gains of chromosomes 3, 5, 9, 11, 15 and 19, as well as, rearrangements of 1p  are recurrently seen in plasma cell disorders.  Complex karyotypic changes are generally associated with an unfavorable clinical course\par FISH myeloma-  Three copies of CCND1 detected (15.5%)\par \par Here for daratumumab kyprolis dexamethasone #9 ( 5/5/20-present)\par To start C3 kyprolis 3 weeks on 1 week off today\par Tolerating well\par Overall has improvement in clinical and lab parameters\par Although he is not entirely convinced\par Labs reviewed and discussed\par Paraproteins trending down\par Labs from today pending\par Proceed with treatment\par Acyclovir 400mg bid. \par \par Anemia\par Likely from multiple myeloma with a component of B12 deficiency\par continue with B12 sublingual daily\par Counts gradually improving\par \par Lower and upper back pain \par Bone metastases\par Completed Radiation C6-T3 spine, completed 5/20/20\par continue with Oxycodone 5mg prn.\par May need PET/CT if new sites of pain\par He declines dental evaluation. Started Xgeva\par \par HTN \par Amlodipine 5mg daily\par Advised to see PCP\par \par Follow up in 1 week\par CBC, CMP\par \par Contact\par Nadia Robert (sister) - [phone]\par \par

## 2020-07-02 NOTE — HISTORY OF PRESENT ILLNESS
[de-identified] : Mr. Rolf Robert is 60 year old male with IgG Kappa multiple myeloma transferred care from Dr. Amy Vo. \par \par Patient initially diagnosed with Multiple myeloma in 2018 and received 3 cycles of RVD but developed a severe skin reaction and was switched to Pomalyst/Pomalidomide.   \par After 2 months he was recommended stem cell transplant-> he was completely against transplant and was lost to follow up\par \par In Feb 2020, he developed low back pain radiating down to both hips. CT Pelvis done demonstrating severe joint space narrowing involving the right hip, moderate osteophytic changes on the left. Abnormal appearance of osseous structures of the pelvis with multiple lytic foci throughout the pelvis with a permeative appearance of the cortex of bilateral iliac bones. Mild compression deformity at L4 age indeterminant noted. \par B/l hips pain persisted with 3/2020 X-ray of pelvis noted multifocal lytic lesions throughout the bone.  PT was then by neurosurgery on 3/30/20 who recommended rad onc with MRI on 3/31/20 noted moderate compression fracture deformity involving C7 vertebral body with retropulsion of bone and mild bony central canal stenosis without cord compression or epidural extension. A completely collapsed T2 vertebral body with mild retropulsion of bone into spinal canal with moderate central canal stenosis with indentation of the ventral cord. No significant epidural tumor extension present and no edema noted within the adjacent spinal cord. Moderate pathologic compression fracture of T6 without retropulsion of bone/cord compression. Mild compression fracture of T7 without retropulsion of bone. Mild of T8 with mild retropulsion of bone and mild central canal stenosis. Moderate pathologic compression fracture of T10 without retropulsion/compression. Minimal pathologic compression of inferior endplate of T11. Mild pathologic compression involving T12 and L3. Moderate pathologic compression fracture involving L4 stable compared to prior CT. Mild retropulsion of bone present.\par \par Patient had bone marrow biopsy performed among other MM tests which demonstrated plasma cell neoplasm > 70%. \par Hyperdiploidy and gains of chromosomes 3, 5, 9, 11, 15 and 19, as well as, rearrangements of 1p  are recurrently seen in plasma cell disorders.  Complex karyotypic changes are generally associated with an unfavorable clinical course\par FISH myeloma-  Three copies of CCND1 detected (15.5%)\par \par 4/2/20 skeletal survey demonstrated scattered lytic lesions in the calvarium, spine, and pelvis without fracture or dislocation. Multilevel spondylosis and vertebral wedge compression deformities noted.\par \par Patient was then seen by radiation oncologist Dr. Rica Villalpando on 4/24/20.\par \par  [de-identified] : He is seen today for follow and daratumumab kyprolis dexamethasone # 9 (5/5/20-present)\par To start new cycles of kyprolis today (3 weeks on 1 week off)\par \par He has no new symptoms\par Co not gaining weight, although he has gained 7 lbs since starting treatment\par \par Weight = 120->121-> 124 lbs -> 128 -> 126 lbs -->124 -> 127 lbs\par \par

## 2020-07-09 ENCOUNTER — RESULT REVIEW (OUTPATIENT)
Age: 60
End: 2020-07-09

## 2020-07-09 ENCOUNTER — APPOINTMENT (OUTPATIENT)
Dept: HEMATOLOGY ONCOLOGY | Facility: CLINIC | Age: 60
End: 2020-07-09
Payer: MEDICAID

## 2020-07-09 VITALS
WEIGHT: 134 LBS | BODY MASS INDEX: 21.03 KG/M2 | DIASTOLIC BLOOD PRESSURE: 110 MMHG | HEIGHT: 67 IN | RESPIRATION RATE: 18 BRPM | OXYGEN SATURATION: 98 % | HEART RATE: 109 BPM | TEMPERATURE: 99.4 F | SYSTOLIC BLOOD PRESSURE: 175 MMHG

## 2020-07-09 DIAGNOSIS — Z11.59 ENCOUNTER FOR SCREENING FOR OTHER VIRAL DISEASES: ICD-10-CM

## 2020-07-09 PROCEDURE — 99214 OFFICE O/P EST MOD 30 MIN: CPT

## 2020-07-09 NOTE — HISTORY OF PRESENT ILLNESS
[de-identified] : Mr. Rolf Robert is 60 year old male with IgG Kappa multiple myeloma transferred care from Dr. Amy Vo. \par \par Patient initially diagnosed with Multiple myeloma in 2018 and received 3 cycles of RVD but developed a severe skin reaction and was switched to Pomalyst/Pomalidomide.   \par After 2 months he was recommended stem cell transplant-> he was completely against transplant and was lost to follow up\par \par In Feb 2020, he developed low back pain radiating down to both hips. CT Pelvis done demonstrating severe joint space narrowing involving the right hip, moderate osteophytic changes on the left. Abnormal appearance of osseous structures of the pelvis with multiple lytic foci throughout the pelvis with a permeative appearance of the cortex of bilateral iliac bones. Mild compression deformity at L4 age indeterminant noted. \par B/l hips pain persisted with 3/2020 X-ray of pelvis noted multifocal lytic lesions throughout the bone.  PT was then by neurosurgery on 3/30/20 who recommended rad onc with MRI on 3/31/20 noted moderate compression fracture deformity involving C7 vertebral body with retropulsion of bone and mild bony central canal stenosis without cord compression or epidural extension. A completely collapsed T2 vertebral body with mild retropulsion of bone into spinal canal with moderate central canal stenosis with indentation of the ventral cord. No significant epidural tumor extension present and no edema noted within the adjacent spinal cord. Moderate pathologic compression fracture of T6 without retropulsion of bone/cord compression. Mild compression fracture of T7 without retropulsion of bone. Mild of T8 with mild retropulsion of bone and mild central canal stenosis. Moderate pathologic compression fracture of T10 without retropulsion/compression. Minimal pathologic compression of inferior endplate of T11. Mild pathologic compression involving T12 and L3. Moderate pathologic compression fracture involving L4 stable compared to prior CT. Mild retropulsion of bone present.\par \par Patient had bone marrow biopsy performed among other MM tests which demonstrated plasma cell neoplasm > 70%. \par Hyperdiploidy and gains of chromosomes 3, 5, 9, 11, 15 and 19, as well as, rearrangements of 1p  are recurrently seen in plasma cell disorders.  Complex karyotypic changes are generally associated with an unfavorable clinical course\par FISH myeloma-  Three copies of CCND1 detected (15.5%)\par \par 4/2/20 skeletal survey demonstrated scattered lytic lesions in the calvarium, spine, and pelvis without fracture or dislocation. Multilevel spondylosis and vertebral wedge compression deformities noted.\par \par Patient was then seen by radiation oncologist Dr. Rica Villalpando on 4/24/20.\par \par  [de-identified] : Current treatment:  daratumumab kyprolis dexamethasone - s/p # 9 last week (5/5/20-present) - \par To start new cycles of kyprolis today (3 weeks on 1 week off)\par \par Pt reports of persistent right rib pain but has not been takign any Oxycodone in the last two days, still thin despite now weighing at 134 (7 lbs weight gained), and elevated BP despite taking 1 Amlodipine earlier this morning.  Pt is consdiering for stem cells transplant. Denies headaches, dizziness, chest pain/palpitation, SOB/HILLS. \par \par Weight = 120->121-> 124 lbs -> 128 -> 126 lbs -->124 -> 127  ->134lbs\par \par

## 2020-07-09 NOTE — ASSESSMENT
[FreeTextEntry1] : Relapsed IgG Kappa multiple myeloma \par Diagnosed 2018 s/p RVD x 3 cycles.\par Developed severe skin reaction to revlimid and was supposed to be switched ot pomalyst.\par However did not want stem cell transplant and decided to stop follow up\par Presented Feb 2020 with low back pain found to have diffuse bone metastases\par Bone marrow (3/20): > 70% clonal plasma cells\par Hyperdiploidy and gains of chromosomes 3, 5, 9, 11, 15 and 19, as well as, rearrangements of 1p  are recurrently seen in plasma cell disorders.  Complex karyotypic changes are generally associated with an unfavorable clinical course\par FISH myeloma-  Three copies of CCND1 detected (15.5%)\par \par Here for daratumumab kyprolis dexamethasone #9 ( 5/5/20-present) - to start #10 q2wks next cycle. \par To start C3 kyprolis 3 weeks on 1 week off today - now on week 2.\par Tolerating well\par Overall has improvement in clinical and lab parameters\par Although he is not entirely convinced\par Labs reviewed and discussed\par Paraproteins trending down\par Labs from today pending\par Proceed with treatment\par Acyclovir 400mg bid. \par Will  speak to Dr. Quinones regarding stem cells transplant.\par \par Anemia\par Likely from multiple myeloma with a component of B12 deficiency\par continue with B12 sublingual daily\par Counts gradually improving\par \par Lower and upper back pain \par Bone metastases\par Completed Radiation C6-T3 spine, completed 5/20/20\par continue with Oxycodone 5mg prn.\par May need PET/CT if new sites of pain\par He declines dental evaluation. Last Xgeva 6/5/20 - on hold last week and this week due to hypocalcemia. Pt stressed to take Ca 600mg bid. \par \par HTN \par Amlodipine 5mg daily\par Advised to see PCP\par \par Follow up in 1 week\par CBC, CMP\par \par Contact\par Nadia Robert (sister) - [phone]\par \par

## 2020-07-15 NOTE — PHYSICAL EXAM
[General Appearance - In No Acute Distress] : in no acute distress [General Appearance - Alert] : alert [Normal] : oriented to person, place and time, the affect was normal, the mood was normal and not anxious

## 2020-07-16 ENCOUNTER — RESULT REVIEW (OUTPATIENT)
Age: 60
End: 2020-07-16

## 2020-07-16 ENCOUNTER — APPOINTMENT (OUTPATIENT)
Dept: HEMATOLOGY ONCOLOGY | Facility: CLINIC | Age: 60
End: 2020-07-16
Payer: MEDICAID

## 2020-07-16 ENCOUNTER — APPOINTMENT (OUTPATIENT)
Dept: RADIATION ONCOLOGY | Facility: CLINIC | Age: 60
End: 2020-07-16
Payer: MEDICAID

## 2020-07-16 VITALS
OXYGEN SATURATION: 99 % | TEMPERATURE: 97.7 F | DIASTOLIC BLOOD PRESSURE: 102 MMHG | WEIGHT: 130.7 LBS | BODY MASS INDEX: 20.51 KG/M2 | HEIGHT: 66.97 IN | HEART RATE: 121 BPM | SYSTOLIC BLOOD PRESSURE: 147 MMHG | RESPIRATION RATE: 20 BRPM

## 2020-07-16 PROCEDURE — 99215 OFFICE O/P EST HI 40 MIN: CPT

## 2020-07-16 PROCEDURE — 99024 POSTOP FOLLOW-UP VISIT: CPT

## 2020-07-16 NOTE — HISTORY OF PRESENT ILLNESS
[de-identified] : Mr. Rolf Robert is 60 year old male with IgG Kappa multiple myeloma transferred care from Dr. Amy Vo. \par \par Patient initially diagnosed with Multiple myeloma in 2018 and received 3 cycles of RVD but developed a severe skin reaction and was switched to Pomalyst/Pomalidomide.   \par After 2 months he was recommended stem cell transplant-> he was completely against transplant and was lost to follow up\par \par In Feb 2020, he developed low back pain radiating down to both hips. CT Pelvis done demonstrating severe joint space narrowing involving the right hip, moderate osteophytic changes on the left. Abnormal appearance of osseous structures of the pelvis with multiple lytic foci throughout the pelvis with a permeative appearance of the cortex of bilateral iliac bones. Mild compression deformity at L4 age indeterminant noted. \par B/l hips pain persisted with 3/2020 X-ray of pelvis noted multifocal lytic lesions throughout the bone.  PT was then by neurosurgery on 3/30/20 who recommended rad onc with MRI on 3/31/20 noted moderate compression fracture deformity involving C7 vertebral body with retropulsion of bone and mild bony central canal stenosis without cord compression or epidural extension. A completely collapsed T2 vertebral body with mild retropulsion of bone into spinal canal with moderate central canal stenosis with indentation of the ventral cord. No significant epidural tumor extension present and no edema noted within the adjacent spinal cord. Moderate pathologic compression fracture of T6 without retropulsion of bone/cord compression. Mild compression fracture of T7 without retropulsion of bone. Mild of T8 with mild retropulsion of bone and mild central canal stenosis. Moderate pathologic compression fracture of T10 without retropulsion/compression. Minimal pathologic compression of inferior endplate of T11. Mild pathologic compression involving T12 and L3. Moderate pathologic compression fracture involving L4 stable compared to prior CT. Mild retropulsion of bone present.\par \par Patient had bone marrow biopsy performed among other MM tests which demonstrated plasma cell neoplasm > 70%. \par Hyperdiploidy and gains of chromosomes 3, 5, 9, 11, 15 and 19, as well as, rearrangements of 1p  are recurrently seen in plasma cell disorders.  Complex karyotypic changes are generally associated with an unfavorable clinical course\par FISH myeloma-  Three copies of CCND1 detected (15.5%)\par \par 4/2/20 skeletal survey demonstrated scattered lytic lesions in the calvarium, spine, and pelvis without fracture or dislocation. Multilevel spondylosis and vertebral wedge compression deformities noted.\par \par Patient was then seen by radiation oncologist Dr. Rica Villalpando on 4/24/20.\par \par  [de-identified] : Current treatment:  daratumumab kyprolis dexamethasone - s/p # 10 last week (5/5/20-present) - \par W3 kyprolis today (3 weeks on 1 week off)\par \par He has pain low back, rib pain\par Saw rad onc today\par Now agreeable to PET/CT\par \par He co inability to gain weight- although he has gained 10 lbs since beginning the treatment \par Has a good appetite\par \par Weight = 120->121-> 124 lbs -> 128 -> 126 lbs -->124 -> 127  ->134 lbs -> 131 lbs\par \par

## 2020-07-16 NOTE — ASSESSMENT
[FreeTextEntry1] : Relapsed IgG Kappa multiple myeloma \par Diagnosed 2018 s/p RVD x 3 cycles.\par Developed severe skin reaction to revlimid and was supposed to be switched ot pomalyst.\par However did not want stem cell transplant and decided to stop follow up\par Presented Feb 2020 with low back pain found to have diffuse bone metastases\par Bone marrow (3/20): > 70% clonal plasma cells\par Hyperdiploidy and gains of chromosomes 3, 5, 9, 11, 15 and 19, as well as, rearrangements of 1p  are recurrently seen in plasma cell disorders.  Complex karyotypic changes are generally associated with an unfavorable clinical course\par FISH myeloma-  Three copies of CCND1 detected (15.5%)\par \par Here for daratumumab kyprolis dexamethasone #10 ( 5/5/20-present)\par W3 kyprolis today. Next week is his week off then he gets Yuko 11, Kyprolis W1/3 nad dex. \par Tolerating well\par Overall has excellent improvement in clinical and lab parameters\par Although he is not entirely convinced\par Labs reviewed and discussed\par Paraproteins trending down\par Proceed with treatment\par Acyclovir 400mg bid. \par \par Back pain\par Rib pain\par Obtain PET/CT\par Radiaiton PRN\par \par Physical deconditioning\par Refer to physical therapy\par \par Anemia\par Likely from multiple myeloma with a component of B12 deficiency\par continue with B12 sublingual daily\par Counts gradually improving\par \par Lower and upper back pain \par Bone metastases\par Completed Radiation C6-T3 spine, completed 5/20/20\par continue with Oxycodone 5mg prn.\par May need PET/CT if new sites of pain\par He declines dental evaluation. Last Xgeva 6/5/20 - on hold last week and this week due to hypocalcemia. Pt stressed to take Ca 600mg bid. \par \par HTN \par Amlodipine 5mg daily\par Advised to see PCP\par \par Follow up in 2 weeks\par CBC, CMP, myeloma panel\par \par Contact\par Nadia Robert (sister) - [phone]\par \par

## 2020-07-16 NOTE — RESULTS/DATA
[FreeTextEntry1] : Labs reviewed and discussed\par \par FLCR - 146.35 -> 3.32\par IgG - 6247 ->  1315 -> 519\par Kappa light chain - 55.83 -> 1.03 -> 0.13\par M spike - 5.1 -> 1 -> 0.4\par \par

## 2020-07-21 NOTE — HISTORY OF PRESENT ILLNESS
[Home] : at home, [unfilled] , at the time of the visit. [Medical Office: ([facility])___] : at the medical office located in  [FreeTextEntry1] : \par Mr. Robert is a 60 year old male, who was diagnosed with multiple myeloma in 2018 on systemic therapy, initially with RVD (3 cycles) and was switched to pomalyst after severe skin reaction. He refused stem cell transplant and decided to cease all care. He presented to the ER in February with bilateral hip pain and lower back pain and was found to have diffuse bony metastases. Bone marrow biopsy (3/20) demonstrated greater than 70% clonal plasma cells. He had progressive inability to ambulate without a roller walker and was on oxycodone for pain management. He was admitted to [facility] in April and was consulted by neurosurgery, who deemed him not a candidate given the multiple compression fractures. He was seen by Dr. Quinones and started on daratumumab kyprolis dexamethasone weekly on 5/5/20 and is status post cycle 9 on 7/9/20. He started new cycles of kyprolis every 3 weeks. He was treated with palliative radiation to C6 - T3 / T6-T12 / L2 - L5 each site to 800 cGy, completing radiation on 5/20/20.  \par \par Imaging: \par - CT Pelvis (2/25/20) demonstrated diffuse lytic lesions throughout the pelvis and lower lumbar spine. Pathological compression at L4. Severe right and moderate left hip arthrosis. \par -  X-ray Pelvis (3/30/20) demonstrated multifocal lytic lesions throughout the bones, consistent with multiple myleoma.\par - MRI T/L/C Spine (3/31/20) - widespread myelomatous changes throughout the entire spine. No large areas of epidural tumor extension. Multiple pathological compression fractures, most pronounced at T2. \par - Skeletal bone survey (4/2/20) demonstrated scattered lytic lesions in the calvarium, spine, and pelvis without fracture or dislocation. Severe osteoarthritis in the right hip. \par \par Mr. Robert tolerated his radiation treatment well. He had significant improvement with ambulation post treatment. He is being seen today via telehealth for his post treatment evaluation. Mr. Robert still notes that he has pain in his back but his level of physical activity has improved significantly. His appetite has improved somewhat. \par

## 2020-07-21 NOTE — DISEASE MANAGEMENT
[Clinical] : TNM Stage: c [N/A] : Currently not applicable [FreeTextEntry4] : IgG Kappa multiple myeloma [TTNM] : x [MTNM] : x [NTNM] : x

## 2020-07-30 ENCOUNTER — RESULT REVIEW (OUTPATIENT)
Age: 60
End: 2020-07-30

## 2020-07-30 ENCOUNTER — APPOINTMENT (OUTPATIENT)
Dept: HEMATOLOGY ONCOLOGY | Facility: CLINIC | Age: 60
End: 2020-07-30
Payer: MEDICAID

## 2020-07-30 VITALS
BODY MASS INDEX: 21.35 KG/M2 | WEIGHT: 136 LBS | HEIGHT: 66.97 IN | HEART RATE: 105 BPM | RESPIRATION RATE: 18 BRPM | OXYGEN SATURATION: 99 % | TEMPERATURE: 99.1 F | DIASTOLIC BLOOD PRESSURE: 95 MMHG | SYSTOLIC BLOOD PRESSURE: 157 MMHG

## 2020-07-30 PROCEDURE — 99214 OFFICE O/P EST MOD 30 MIN: CPT

## 2020-07-30 NOTE — ASSESSMENT
[FreeTextEntry1] : Relapsed IgG Kappa multiple myeloma \par Diagnosed 2018 s/p RVD x 3 cycles.\par Developed severe skin reaction to revlimid and was supposed to be switched ot pomalyst.\par However did not want stem cell transplant and decided to stop follow up\par Presented Feb 2020 with low back pain found to have diffuse bone metastases\par Bone marrow (3/20): > 70% clonal plasma cells\par Hyperdiploidy and gains of chromosomes 3, 5, 9, 11, 15 and 19, as well as, rearrangements of 1p  are recurrently seen in plasma cell disorders.  Complex karyotypic changes are generally associated with an unfavorable clinical course\par FISH myeloma-  Three copies of CCND1 detected (15.5%)\par \par Here for daratumumab kyprolis dexamethasone #11 ( 5/5/20-present)\par W1 kyprolis today. \par Tolerating well\par Overall has excellent improvement in clinical and lab parameters\par Although he is not entirely convinced\par Labs reviewed and discussed\par Paraproteins trending down\par Proceed with treatment\par Acyclovir 400mg bid. \par \par Back pain - Rib pain - Pet/Ct scan denied - will try to order pan-MRI or bone scan\par Radiaiton PRN\par \par Physical deconditioning - Refer to physical therapy\par \par Anemia\par Likely from multiple myeloma with a component of B12 deficiency\par continue with B12 sublingual daily\par Counts gradually improving\par \par Lower and upper back pain \par Bone metastases\par Completed Radiation C6-T3 spine, completed 5/20/20\par continue with Oxycodone 5mg prn.\par He declines dental evaluation. given Xgeva 7/30/20 - stressed to take Ca 600mg bid. \par \par HTN \par Amlodipine 5mg daily\par Advised to see PCP\par \par Follow up in 1 week for w2 of Kyprolis\par CBC, CMP\par  myeloma panel (monthly)\par \par Contact\par Nadia Robert (sister) - [phone]\par \par

## 2020-07-30 NOTE — HISTORY OF PRESENT ILLNESS
[de-identified] : Mr. Rolf Robert is 60 year old male with IgG Kappa multiple myeloma transferred care from Dr. Amy Vo. \par \par Patient initially diagnosed with Multiple myeloma in 2018 and received 3 cycles of RVD but developed a severe skin reaction and was switched to Pomalyst/Pomalidomide.   \par After 2 months he was recommended stem cell transplant-> he was completely against transplant and was lost to follow up\par \par In Feb 2020, he developed low back pain radiating down to both hips. CT Pelvis done demonstrating severe joint space narrowing involving the right hip, moderate osteophytic changes on the left. Abnormal appearance of osseous structures of the pelvis with multiple lytic foci throughout the pelvis with a permeative appearance of the cortex of bilateral iliac bones. Mild compression deformity at L4 age indeterminant noted. \par B/l hips pain persisted with 3/2020 X-ray of pelvis noted multifocal lytic lesions throughout the bone.  PT was then by neurosurgery on 3/30/20 who recommended rad onc with MRI on 3/31/20 noted moderate compression fracture deformity involving C7 vertebral body with retropulsion of bone and mild bony central canal stenosis without cord compression or epidural extension. A completely collapsed T2 vertebral body with mild retropulsion of bone into spinal canal with moderate central canal stenosis with indentation of the ventral cord. No significant epidural tumor extension present and no edema noted within the adjacent spinal cord. Moderate pathologic compression fracture of T6 without retropulsion of bone/cord compression. Mild compression fracture of T7 without retropulsion of bone. Mild of T8 with mild retropulsion of bone and mild central canal stenosis. Moderate pathologic compression fracture of T10 without retropulsion/compression. Minimal pathologic compression of inferior endplate of T11. Mild pathologic compression involving T12 and L3. Moderate pathologic compression fracture involving L4 stable compared to prior CT. Mild retropulsion of bone present.\par \par Patient had bone marrow biopsy performed among other MM tests which demonstrated plasma cell neoplasm > 70%. \par Hyperdiploidy and gains of chromosomes 3, 5, 9, 11, 15 and 19, as well as, rearrangements of 1p  are recurrently seen in plasma cell disorders.  Complex karyotypic changes are generally associated with an unfavorable clinical course\par FISH myeloma-  Three copies of CCND1 detected (15.5%)\par \par 4/2/20 skeletal survey demonstrated scattered lytic lesions in the calvarium, spine, and pelvis without fracture or dislocation. Multilevel spondylosis and vertebral wedge compression deformities noted.\par \par Patient was then seen by radiation oncologist Dr. Rica Villalpando on 4/24/20.\par \par  [de-identified] : Current treatment:  daratumumab kyprolis dexamethasone - now on cycle  # 11 (5/5/20-present) \par W1 of  kyprolis today (3 weeks on 1 week off)\par \par Patient reports of persistent bone pain on left hip, low back, b/l thighs and legs - on Oxycodone 5mg as needed but has not been taking it every day. Patient has great appetite with weight gained but still unhappy he's not gaining fast enough. \par \par Weight = 120->121-> 124 lbs -> 128 -> 126 lbs -->124 -> 127  ->134 lbs -> 131 lbs ->136lbs\par \par

## 2020-08-06 ENCOUNTER — RESULT REVIEW (OUTPATIENT)
Age: 60
End: 2020-08-06

## 2020-08-06 ENCOUNTER — APPOINTMENT (OUTPATIENT)
Dept: HEMATOLOGY ONCOLOGY | Facility: CLINIC | Age: 60
End: 2020-08-06
Payer: MEDICAID

## 2020-08-06 VITALS
SYSTOLIC BLOOD PRESSURE: 170 MMHG | HEIGHT: 66.97 IN | TEMPERATURE: 96.9 F | WEIGHT: 140.8 LBS | BODY MASS INDEX: 22.1 KG/M2 | HEART RATE: 88 BPM | DIASTOLIC BLOOD PRESSURE: 100 MMHG | RESPIRATION RATE: 20 BRPM | OXYGEN SATURATION: 99 %

## 2020-08-06 DIAGNOSIS — U07.1 COVID-19: ICD-10-CM

## 2020-08-06 PROCEDURE — 99214 OFFICE O/P EST MOD 30 MIN: CPT

## 2020-08-06 NOTE — HISTORY OF PRESENT ILLNESS
[de-identified] : Mr. Rolf Robert is 60 year old male with IgG Kappa multiple myeloma transferred care from Dr. Amy Vo. \par \par Patient initially diagnosed with Multiple myeloma in 2018 and received 3 cycles of RVD but developed a severe skin reaction and was switched to Pomalyst/Pomalidomide.   \par After 2 months he was recommended stem cell transplant-> he was completely against transplant and was lost to follow up\par \par In Feb 2020, he developed low back pain radiating down to both hips. CT Pelvis done demonstrating severe joint space narrowing involving the right hip, moderate osteophytic changes on the left. Abnormal appearance of osseous structures of the pelvis with multiple lytic foci throughout the pelvis with a permeative appearance of the cortex of bilateral iliac bones. Mild compression deformity at L4 age indeterminant noted. \par B/l hips pain persisted with 3/2020 X-ray of pelvis noted multifocal lytic lesions throughout the bone.  PT was then by neurosurgery on 3/30/20 who recommended rad onc with MRI on 3/31/20 noted moderate compression fracture deformity involving C7 vertebral body with retropulsion of bone and mild bony central canal stenosis without cord compression or epidural extension. A completely collapsed T2 vertebral body with mild retropulsion of bone into spinal canal with moderate central canal stenosis with indentation of the ventral cord. No significant epidural tumor extension present and no edema noted within the adjacent spinal cord. Moderate pathologic compression fracture of T6 without retropulsion of bone/cord compression. Mild compression fracture of T7 without retropulsion of bone. Mild of T8 with mild retropulsion of bone and mild central canal stenosis. Moderate pathologic compression fracture of T10 without retropulsion/compression. Minimal pathologic compression of inferior endplate of T11. Mild pathologic compression involving T12 and L3. Moderate pathologic compression fracture involving L4 stable compared to prior CT. Mild retropulsion of bone present.\par \par Patient had bone marrow biopsy performed among other MM tests which demonstrated plasma cell neoplasm > 70%. \par Hyperdiploidy and gains of chromosomes 3, 5, 9, 11, 15 and 19, as well as, rearrangements of 1p  are recurrently seen in plasma cell disorders.  Complex karyotypic changes are generally associated with an unfavorable clinical course\par FISH myeloma-  Three copies of CCND1 detected (15.5%)\par \par 4/2/20 skeletal survey demonstrated scattered lytic lesions in the calvarium, spine, and pelvis without fracture or dislocation. Multilevel spondylosis and vertebral wedge compression deformities noted.\par \par Patient was then seen by radiation oncologist Dr. Rica Villalpando on 4/24/20.\par \par  [de-identified] : Current treatment:  daratumumab kyprolis dexamethasone - now on cycle  # 11 (5/5/20-present) \par W1 of  kyprolis today (3 weeks on 1 week off) - W2 Kyprolis held today due to patient positive Covid\par \par Patient is here for treatment reports of having headaches along with eye pain today, has stopped taking his Amlodipine in the last week due to not having any more medications. He reports of generalized fatigue and weakness. Denies fever, SOB/HILLS. \par \par Covid test - positive today\par \par Weight = 120->121-> 124 lbs -> 128 -> 126 lbs -->124 -> 127  ->134 lbs -> 131 lbs ->136lbs\par \par

## 2020-08-26 ENCOUNTER — RESULT REVIEW (OUTPATIENT)
Age: 60
End: 2020-08-26

## 2020-08-27 ENCOUNTER — RESULT REVIEW (OUTPATIENT)
Age: 60
End: 2020-08-27

## 2020-08-27 ENCOUNTER — APPOINTMENT (OUTPATIENT)
Dept: HEMATOLOGY ONCOLOGY | Facility: CLINIC | Age: 60
End: 2020-08-27

## 2020-09-10 ENCOUNTER — RESULT REVIEW (OUTPATIENT)
Age: 60
End: 2020-09-10

## 2020-09-10 ENCOUNTER — APPOINTMENT (OUTPATIENT)
Dept: HEMATOLOGY ONCOLOGY | Facility: CLINIC | Age: 60
End: 2020-09-10
Payer: MEDICAID

## 2020-09-10 VITALS
TEMPERATURE: 98.8 F | HEIGHT: 66.97 IN | HEART RATE: 116 BPM | DIASTOLIC BLOOD PRESSURE: 84 MMHG | RESPIRATION RATE: 18 BRPM | SYSTOLIC BLOOD PRESSURE: 149 MMHG | BODY MASS INDEX: 22.96 KG/M2 | OXYGEN SATURATION: 97 % | WEIGHT: 146.3 LBS

## 2020-09-10 PROCEDURE — 99215 OFFICE O/P EST HI 40 MIN: CPT

## 2020-09-10 NOTE — HISTORY OF PRESENT ILLNESS
[de-identified] : Current treatment:  daratumumab kyprolis dexamethasone - now on cycle  # 11 (5/5/20-present) \par W1 of  kyprolis today (3 weeks on 1 week off) - W2 Kyprolis held today due to patient positive Covid\par \par He had Covid infection 8/6/20\par Recovered very well without any issues\par Has 2 negative test\par \par He has bone pains\par \par Weight = 120->121-> 124 lbs -> 128 -> 126 lbs -->124 -> 127  ->134 lbs -> 131 lbs ->136 lbs -> 146 lbs\par \par  [de-identified] : Mr. Rolf Robert is 60 year old male with IgG Kappa multiple myeloma transferred care from Dr. Amy Vo. \par \par Patient initially diagnosed with Multiple myeloma in 2018 and received 3 cycles of RVD but developed a severe skin reaction and was switched to Pomalyst/Pomalidomide.   \par After 2 months he was recommended stem cell transplant-> he was completely against transplant and was lost to follow up\par \par In Feb 2020, he developed low back pain radiating down to both hips. CT Pelvis done demonstrating severe joint space narrowing involving the right hip, moderate osteophytic changes on the left. Abnormal appearance of osseous structures of the pelvis with multiple lytic foci throughout the pelvis with a permeative appearance of the cortex of bilateral iliac bones. Mild compression deformity at L4 age indeterminant noted. \par B/l hips pain persisted with 3/2020 X-ray of pelvis noted multifocal lytic lesions throughout the bone.  PT was then by neurosurgery on 3/30/20 who recommended rad onc with MRI on 3/31/20 noted moderate compression fracture deformity involving C7 vertebral body with retropulsion of bone and mild bony central canal stenosis without cord compression or epidural extension. A completely collapsed T2 vertebral body with mild retropulsion of bone into spinal canal with moderate central canal stenosis with indentation of the ventral cord. No significant epidural tumor extension present and no edema noted within the adjacent spinal cord. Moderate pathologic compression fracture of T6 without retropulsion of bone/cord compression. Mild compression fracture of T7 without retropulsion of bone. Mild of T8 with mild retropulsion of bone and mild central canal stenosis. Moderate pathologic compression fracture of T10 without retropulsion/compression. Minimal pathologic compression of inferior endplate of T11. Mild pathologic compression involving T12 and L3. Moderate pathologic compression fracture involving L4 stable compared to prior CT. Mild retropulsion of bone present.\par \par Patient had bone marrow biopsy performed among other MM tests which demonstrated plasma cell neoplasm > 70%. \par Hyperdiploidy and gains of chromosomes 3, 5, 9, 11, 15 and 19, as well as, rearrangements of 1p  are recurrently seen in plasma cell disorders.  Complex karyotypic changes are generally associated with an unfavorable clinical course\par FISH myeloma-  Three copies of CCND1 detected (15.5%)\par \par 4/2/20 skeletal survey demonstrated scattered lytic lesions in the calvarium, spine, and pelvis without fracture or dislocation. Multilevel spondylosis and vertebral wedge compression deformities noted.\par \par Patient was then seen by radiation oncologist Dr. Rica Villalpando on 4/24/20.\par \par

## 2020-09-10 NOTE — ASSESSMENT
[FreeTextEntry1] : Relapsed IgG Kappa multiple myeloma \par Diagnosed 2018 s/p RVD x 3 cycles.\par Developed severe skin reaction to revlimid and was supposed to be switched ot pomalyst.\par However did not want stem cell transplant and decided to stop follow up\par Presented Feb 2020 with low back pain found to have diffuse bone metastases\par Bone marrow (3/20): > 70% clonal plasma cells\par Hyperdiploidy and gains of chromosomes 3, 5, 9, 11, 15 and 19, as well as, rearrangements of 1p  are recurrently seen in plasma cell disorders.  Complex karyotypic changes are generally associated with an unfavorable clinical course\par FISH myeloma-  Three copies of CCND1 detected (15.5%)\par \par Here for daratumumab kyprolis dexamethasone #11 ( 5/5/20-present)\par Tolerating well\par Overall has excellent improvement in clinical and lab parameters Although he is not entirely convinced\par Has gained weight\par Labs reviewed and discussed\par Paraproteins trending down\par Acyclovir 400mg bid. \par \par COVID infection\par Recovered well\par \par Back pain - Rib pain - Pet/Ct scan denied - will try to order pan-MRI or bone scan\par Radiaiton PRN\par \par Physical deconditioning \par Refer to physical therapy\par \par Lower and upper back pain \par Bone metastases\par Completed Radiation C6-T3 spine, completed 5/20/20\par continue with Oxycodone 5mg prn.\par He declines dental evaluation. given Xgeva 7/30/20 - stressed to take Ca 600mg bid. \par \par Follow up in 2 weeks for DKd #12\par CBC, CMP, COVID antibody\par \par Contact\par Nadia Robert (sister) - [phone]\par \par

## 2020-09-23 ENCOUNTER — RESULT REVIEW (OUTPATIENT)
Age: 60
End: 2020-09-23

## 2020-09-24 ENCOUNTER — RESULT REVIEW (OUTPATIENT)
Age: 60
End: 2020-09-24

## 2020-09-24 ENCOUNTER — APPOINTMENT (OUTPATIENT)
Dept: HEMATOLOGY ONCOLOGY | Facility: CLINIC | Age: 60
End: 2020-09-24
Payer: MEDICAID

## 2020-09-24 VITALS
OXYGEN SATURATION: 97 % | RESPIRATION RATE: 18 BRPM | TEMPERATURE: 98.2 F | HEIGHT: 66.97 IN | SYSTOLIC BLOOD PRESSURE: 152 MMHG | BODY MASS INDEX: 23.07 KG/M2 | DIASTOLIC BLOOD PRESSURE: 93 MMHG | WEIGHT: 147 LBS | HEART RATE: 108 BPM

## 2020-09-24 PROCEDURE — 99214 OFFICE O/P EST MOD 30 MIN: CPT

## 2020-09-24 NOTE — HISTORY OF PRESENT ILLNESS
[de-identified] : Mr. Rolf Robert is 60 year old male with IgG Kappa multiple myeloma transferred care from Dr. Amy Vo. \par \par Patient initially diagnosed with Multiple myeloma in 2018 and received 3 cycles of RVD but developed a severe skin reaction and was switched to Pomalyst/Pomalidomide.   \par After 2 months he was recommended stem cell transplant-> he was completely against transplant and was lost to follow up\par \par In Feb 2020, he developed low back pain radiating down to both hips. CT Pelvis done demonstrating severe joint space narrowing involving the right hip, moderate osteophytic changes on the left. Abnormal appearance of osseous structures of the pelvis with multiple lytic foci throughout the pelvis with a permeative appearance of the cortex of bilateral iliac bones. Mild compression deformity at L4 age indeterminant noted. \par B/l hips pain persisted with 3/2020 X-ray of pelvis noted multifocal lytic lesions throughout the bone.  PT was then by neurosurgery on 3/30/20 who recommended rad onc with MRI on 3/31/20 noted moderate compression fracture deformity involving C7 vertebral body with retropulsion of bone and mild bony central canal stenosis without cord compression or epidural extension. A completely collapsed T2 vertebral body with mild retropulsion of bone into spinal canal with moderate central canal stenosis with indentation of the ventral cord. No significant epidural tumor extension present and no edema noted within the adjacent spinal cord. Moderate pathologic compression fracture of T6 without retropulsion of bone/cord compression. Mild compression fracture of T7 without retropulsion of bone. Mild of T8 with mild retropulsion of bone and mild central canal stenosis. Moderate pathologic compression fracture of T10 without retropulsion/compression. Minimal pathologic compression of inferior endplate of T11. Mild pathologic compression involving T12 and L3. Moderate pathologic compression fracture involving L4 stable compared to prior CT. Mild retropulsion of bone present.\par \par Patient had bone marrow biopsy performed among other MM tests which demonstrated plasma cell neoplasm > 70%. \par Hyperdiploidy and gains of chromosomes 3, 5, 9, 11, 15 and 19, as well as, rearrangements of 1p  are recurrently seen in plasma cell disorders.  Complex karyotypic changes are generally associated with an unfavorable clinical course\par FISH myeloma-  Three copies of CCND1 detected (15.5%)\par \par 4/2/20 skeletal survey demonstrated scattered lytic lesions in the calvarium, spine, and pelvis without fracture or dislocation. Multilevel spondylosis and vertebral wedge compression deformities noted.\par \par Patient was then seen by radiation oncologist Dr. Rica Villalpando on 4/24/20.\par \par  [de-identified] : Current treatment:  daratumumab kyprolis dexamethasone - now on cycle  # 12 (5/5/20-present) \par W1 of  kyprolis today (3 weeks on 1 week off) \par \par Patient with chronic pain on low back and right ribs but rib pain resolved. He's to start PT tomorrow for muscle weakness. He's eating well with weight gained. His BP is elevated but not taking his Amlodipine daily. \par \par 9/23/20 bone scan- Sites of abnormal increased tracer localization in multiple bilateral ribs and at multiple levels in the spine are compatible with lytic lesions and/or fractures evident on prior CT and radiographs. Additional sites of abnormality in bilateral pelvis are compatible with lytic lesions evident on prior radiographs. Additional lytic lesions evident on prior skeletal survey do not demonstrate abnormal increased tracer localization. Increased tracer localization in the shoulders, hips, and left knee is likely arthritic, and sites of abnormality in the maxilla and mandible are compatible with dental disease.\par Weight = 120->121-> 124 lbs -> 128 -> 126 lbs -->124 -> 127  ->134 lbs -> 131 lbs ->136 lbs -> 146 lbs ->147lbs\par \par

## 2020-09-24 NOTE — ASSESSMENT
[FreeTextEntry1] : Relapsed IgG Kappa multiple myeloma \par Diagnosed 2018 s/p RVD x 3 cycles.\par Developed severe skin reaction to revlimid and was supposed to be switched ot pomalyst.\par However did not want stem cell transplant and decided to stop follow up\par Presented Feb 2020 with low back pain found to have diffuse bone metastases\par Bone marrow (3/20): > 70% clonal plasma cells\par Hyperdiploidy and gains of chromosomes 3, 5, 9, 11, 15 and 19, as well as, rearrangements of 1p  are recurrently seen in plasma cell disorders.  Complex karyotypic changes are generally associated with an unfavorable clinical course\par FISH myeloma-  Three copies of CCND1 detected (15.5%)\par \par Here for daratumumab kyprolis dexamethasone #12 ( 5/5/20-present) - W1 of Kyprolis\par Tolerating well\par Overall has excellent improvement in clinical and lab parameters Although he is not entirely convinced\par Has gained weight\par Labs reviewed and discussed\par Paraproteins trending down\par Acyclovir 400mg bid. \par \par Hx of COVID infection -recovered well\par \par Physical deconditioning  = starting physical therapy 9/22/20\par \par Lower and upper back pain - Rib pain - pet/ct scan denied. bone scan as stated above. Pain improved.\par Bone metastases\par Completed Radiation C6-T3 spine, completed 5/20/20\par continue with Oxycodone 5mg prn.\par He declines dental evaluation. given Xgeva 9/10/20 - stressed to take Ca 600mg bid. \par \par Follow up in 1 weeks for cycle 12 with W2 of kyprolis\par CBC, CMP, COVID antibody\par \par Contact\par Nadia Robert (sister) - [phone]\par \par

## 2020-09-24 NOTE — PHYSICAL EXAM
[Ambulatory and capable of all self care but unable to carry out any work activities] : Status 2- Ambulatory and capable of all self care but unable to carry out any work activities. Up and about more than 50% of waking hours [Normal] : grossly intact

## 2020-10-01 ENCOUNTER — APPOINTMENT (OUTPATIENT)
Dept: HEMATOLOGY ONCOLOGY | Facility: CLINIC | Age: 60
End: 2020-10-01
Payer: MEDICAID

## 2020-10-01 ENCOUNTER — RESULT REVIEW (OUTPATIENT)
Age: 60
End: 2020-10-01

## 2020-10-01 VITALS
HEIGHT: 66.97 IN | SYSTOLIC BLOOD PRESSURE: 157 MMHG | DIASTOLIC BLOOD PRESSURE: 107 MMHG | WEIGHT: 146.2 LBS | TEMPERATURE: 98.4 F | OXYGEN SATURATION: 99 % | HEART RATE: 99 BPM | RESPIRATION RATE: 18 BRPM | BODY MASS INDEX: 22.95 KG/M2

## 2020-10-01 PROCEDURE — 99215 OFFICE O/P EST HI 40 MIN: CPT

## 2020-10-01 NOTE — ASSESSMENT
[FreeTextEntry1] : Relapsed IgG Kappa multiple myeloma \par Diagnosed 2018 s/p RVD x 3 cycles.\par Developed severe skin reaction to revlimid and was supposed to be switched ot pomalyst.\par However did not want stem cell transplant and decided to stop follow up\par Presented Feb 2020 with low back pain found to have diffuse bone metastases\par Bone marrow (3/20): > 70% clonal plasma cells\par Hyperdiploidy and gains of chromosomes 3, 5, 9, 11, 15 and 19, as well as, rearrangements of 1p  are recurrently seen in plasma cell disorders.  Complex karyotypic changes are generally associated with an unfavorable clinical course\par FISH myeloma-  Three copies of CCND1 detected (15.5%)\par \par Here for daratumumab kyprolis dexamethasone #12 ( 5/5/20-present) - W2 of Kyprolis\par To get kyprolis and dex only today\par Next week briseida, kyprolis, dex and xgeva followed by 1 week off\par Tolerating well\par Overall has excellent improvement in clinical and lab parameters Although he is not entirely convinced\par Has gained weight\par Labs reviewed and discussed\par Paraproteins trending down\par Acyclovir 400mg bid. \par \par Physical deconditioning  \par Physical therapy\par \par Lower and upper back pain - Rib pain - pet/ct scan denied. bone scan as stated above. Pain improved.\par Bone metastases\par Completed Radiation C6-T3 spine, completed 5/20/20\par continue with Oxycodone 5mg prn.\par He declines dental evaluation. given Xgeva 9/10/20 - stressed to take Ca 600mg bid. \par \par Follow up in 1 weeks for briseida 13 with W3 of kyprolis\par CBC, CMP, COVID antibody\par \par Contact\par Nadia Robert (sister) - [phone]\par \par

## 2020-10-01 NOTE — HISTORY OF PRESENT ILLNESS
[de-identified] : Mr. Rolf Robert is 60 year old male with IgG Kappa multiple myeloma transferred care from Dr. Amy Vo. \par \par Patient initially diagnosed with Multiple myeloma in 2018 and received 3 cycles of RVD but developed a severe skin reaction and was switched to Pomalyst/Pomalidomide.   \par After 2 months he was recommended stem cell transplant-> he was completely against transplant and was lost to follow up\par \par In Feb 2020, he developed low back pain radiating down to both hips. CT Pelvis done demonstrating severe joint space narrowing involving the right hip, moderate osteophytic changes on the left. Abnormal appearance of osseous structures of the pelvis with multiple lytic foci throughout the pelvis with a permeative appearance of the cortex of bilateral iliac bones. Mild compression deformity at L4 age indeterminant noted. \par B/l hips pain persisted with 3/2020 X-ray of pelvis noted multifocal lytic lesions throughout the bone.  PT was then by neurosurgery on 3/30/20 who recommended rad onc with MRI on 3/31/20 noted moderate compression fracture deformity involving C7 vertebral body with retropulsion of bone and mild bony central canal stenosis without cord compression or epidural extension. A completely collapsed T2 vertebral body with mild retropulsion of bone into spinal canal with moderate central canal stenosis with indentation of the ventral cord. No significant epidural tumor extension present and no edema noted within the adjacent spinal cord. Moderate pathologic compression fracture of T6 without retropulsion of bone/cord compression. Mild compression fracture of T7 without retropulsion of bone. Mild of T8 with mild retropulsion of bone and mild central canal stenosis. Moderate pathologic compression fracture of T10 without retropulsion/compression. Minimal pathologic compression of inferior endplate of T11. Mild pathologic compression involving T12 and L3. Moderate pathologic compression fracture involving L4 stable compared to prior CT. Mild retropulsion of bone present.\par \par Patient had bone marrow biopsy performed among other MM tests which demonstrated plasma cell neoplasm > 70%. \par Hyperdiploidy and gains of chromosomes 3, 5, 9, 11, 15 and 19, as well as, rearrangements of 1p  are recurrently seen in plasma cell disorders.  Complex karyotypic changes are generally associated with an unfavorable clinical course\par FISH myeloma-  Three copies of CCND1 detected (15.5%)\par \par 4/2/20 skeletal survey demonstrated scattered lytic lesions in the calvarium, spine, and pelvis without fracture or dislocation. Multilevel spondylosis and vertebral wedge compression deformities noted.\par \par Patient was then seen by radiation oncologist Dr. Rica Villalpando on 4/24/20.\par \par  [de-identified] : Current treatment:  daratumumab kyprolis dexamethasone - [place]  # 12 (5/5/20-present) \par W2 of  kyprolis today (3 weeks on 1 week off) \par \par He continues to have pain in the lower back\par Better than before\par Does not want to follow up with rad onc\par \par Was supposed to see physical therapy yesterday but he did not show up\par \par Weight = 120->121-> 124 lbs -> 128 -> 126 lbs -->124 -> 127  ->134 lbs -> 131 lbs ->136 lbs -> 146 lbs ->147lbs -> 146 lbs\par \par

## 2020-10-08 ENCOUNTER — RESULT REVIEW (OUTPATIENT)
Age: 60
End: 2020-10-08

## 2020-10-08 ENCOUNTER — APPOINTMENT (OUTPATIENT)
Dept: HEMATOLOGY ONCOLOGY | Facility: CLINIC | Age: 60
End: 2020-10-08
Payer: MEDICAID

## 2020-10-08 VITALS
OXYGEN SATURATION: 99 % | TEMPERATURE: 98.8 F | HEIGHT: 66.97 IN | BODY MASS INDEX: 23.15 KG/M2 | DIASTOLIC BLOOD PRESSURE: 100 MMHG | HEART RATE: 104 BPM | SYSTOLIC BLOOD PRESSURE: 159 MMHG | WEIGHT: 147.5 LBS | RESPIRATION RATE: 20 BRPM

## 2020-10-08 PROCEDURE — 99214 OFFICE O/P EST MOD 30 MIN: CPT

## 2020-10-08 NOTE — ASSESSMENT
[FreeTextEntry1] : Relapsed IgG Kappa multiple myeloma \par Diagnosed 2018 s/p RVD x 3 cycles.\par Developed severe skin reaction to revlimid and was supposed to be switched ot pomalyst.\par However did not want stem cell transplant and decided to stop follow up\par Presented Feb 2020 with low back pain found to have diffuse bone metastases\par Bone marrow (3/20): > 70% clonal plasma cells\par Hyperdiploidy and gains of chromosomes 3, 5, 9, 11, 15 and 19, as well as, rearrangements of 1p  are recurrently seen in plasma cell disorders.  Complex karyotypic changes are generally associated with an unfavorable clinical course\par FISH myeloma-  Three copies of CCND1 detected (15.5%)\par \par Here for daratumumab kyprolis dexamethasone #15 ( 5/5/20-present) - W3 of Kyprolis\par Tolerating well\par Overall has excellent improvement in clinical and lab parameters Although he is not entirely convinced\par Has gained weight\par Labs reviewed and discussed\par Paraproteins trending down\par Acyclovir 400mg bid. \par \par Physical deconditioning  - to start physical therapy\par \par Lower and upper back pain - Rib pain - pet/ct scan denied. bone scan as stated above. Pain improved.\par Bone metastases\par Completed Radiation C6-T3 spine, completed 5/20/20\par continue with Oxycodone 5mg prn.\par Xgeva 9/10/20 - stressed to take Ca 600mg bid.- Patient with left upper molar pain - holding off Xgeva this month until patient go for dental check up\par \par case and management discussed with Dr. Quinones \par Follow up in 2 weeks for briseida 16 with W1 of kyprolis\par CBC, CMP, Multiple myeloma\par \par Contact\par Nadia Robert (sister) - [phone]\par \par

## 2020-10-08 NOTE — HISTORY OF PRESENT ILLNESS
[de-identified] : Mr. Rolf Robert is 60 year old male with IgG Kappa multiple myeloma transferred care from Dr. Amy Vo. \par \par Patient initially diagnosed with Multiple myeloma in 2018 and received 3 cycles of RVD but developed a severe skin reaction and was switched to Pomalyst/Pomalidomide.   \par After 2 months he was recommended stem cell transplant-> he was completely against transplant and was lost to follow up\par \par In Feb 2020, he developed low back pain radiating down to both hips. CT Pelvis done demonstrating severe joint space narrowing involving the right hip, moderate osteophytic changes on the left. Abnormal appearance of osseous structures of the pelvis with multiple lytic foci throughout the pelvis with a permeative appearance of the cortex of bilateral iliac bones. Mild compression deformity at L4 age indeterminant noted. \par B/l hips pain persisted with 3/2020 X-ray of pelvis noted multifocal lytic lesions throughout the bone.  PT was then by neurosurgery on 3/30/20 who recommended rad onc with MRI on 3/31/20 noted moderate compression fracture deformity involving C7 vertebral body with retropulsion of bone and mild bony central canal stenosis without cord compression or epidural extension. A completely collapsed T2 vertebral body with mild retropulsion of bone into spinal canal with moderate central canal stenosis with indentation of the ventral cord. No significant epidural tumor extension present and no edema noted within the adjacent spinal cord. Moderate pathologic compression fracture of T6 without retropulsion of bone/cord compression. Mild compression fracture of T7 without retropulsion of bone. Mild of T8 with mild retropulsion of bone and mild central canal stenosis. Moderate pathologic compression fracture of T10 without retropulsion/compression. Minimal pathologic compression of inferior endplate of T11. Mild pathologic compression involving T12 and L3. Moderate pathologic compression fracture involving L4 stable compared to prior CT. Mild retropulsion of bone present.\par \par Patient had bone marrow biopsy performed among other MM tests which demonstrated plasma cell neoplasm > 70%. \par Hyperdiploidy and gains of chromosomes 3, 5, 9, 11, 15 and 19, as well as, rearrangements of 1p  are recurrently seen in plasma cell disorders.  Complex karyotypic changes are generally associated with an unfavorable clinical course\par FISH myeloma-  Three copies of CCND1 detected (15.5%)\par \par 4/2/20 skeletal survey demonstrated scattered lytic lesions in the calvarium, spine, and pelvis without fracture or dislocation. Multilevel spondylosis and vertebral wedge compression deformities noted.\par \par Patient was then seen by radiation oncologist Dr. Rica Villalpando on 4/24/20.\par \par  [de-identified] : Current treatment:  daratumumab kyprolis dexamethasone - Yuko  # 15 (5/5/20-present) \par W3 of  kyprolis today (3 weeks on 1 week off) \par \par Patient is here for treatment reports of persisted low back pain and b/l lower ext weakness but has yet started physical therapy. Patient reports of eating well and only taking 1 Oxycodone every other day for pain.  He reports of receding gum line on back of molars with left upper tooth ache, last seen dentist was more than a year ago. No joint swelling or pain. \par \par Weight = 120->121-> 124 lbs -> 128 -> 126 lbs -->124 -> 127  ->134 lbs -> 131 lbs ->136 lbs -> 146 lbs ->147lbs -> 146 ->147lbs.\par \par

## 2020-10-15 ENCOUNTER — RESULT REVIEW (OUTPATIENT)
Age: 60
End: 2020-10-15

## 2020-10-15 ENCOUNTER — APPOINTMENT (OUTPATIENT)
Dept: HEMATOLOGY ONCOLOGY | Facility: CLINIC | Age: 60
End: 2020-10-15
Payer: MEDICAID

## 2020-10-15 VITALS
BODY MASS INDEX: 22.6 KG/M2 | SYSTOLIC BLOOD PRESSURE: 155 MMHG | HEIGHT: 66.97 IN | DIASTOLIC BLOOD PRESSURE: 96 MMHG | HEART RATE: 96 BPM | OXYGEN SATURATION: 99 % | TEMPERATURE: 97.8 F | RESPIRATION RATE: 96 BRPM | WEIGHT: 144 LBS

## 2020-10-15 PROCEDURE — 99214 OFFICE O/P EST MOD 30 MIN: CPT

## 2020-10-19 NOTE — HISTORY OF PRESENT ILLNESS
[de-identified] : Mr. Rolf Robert is 60 year old male with IgG Kappa multiple myeloma transferred care from Dr. Amy Vo. \par \par Patient initially diagnosed with Multiple myeloma in 2018 and received 3 cycles of RVD but developed a severe skin reaction and was switched to Pomalyst/Pomalidomide.   \par After 2 months he was recommended stem cell transplant-> he was completely against transplant and was lost to follow up\par \par In Feb 2020, he developed low back pain radiating down to both hips. CT Pelvis done demonstrating severe joint space narrowing involving the right hip, moderate osteophytic changes on the left. Abnormal appearance of osseous structures of the pelvis with multiple lytic foci throughout the pelvis with a permeative appearance of the cortex of bilateral iliac bones. Mild compression deformity at L4 age indeterminant noted. \par B/l hips pain persisted with 3/2020 X-ray of pelvis noted multifocal lytic lesions throughout the bone.  PT was then by neurosurgery on 3/30/20 who recommended rad onc with MRI on 3/31/20 noted moderate compression fracture deformity involving C7 vertebral body with retropulsion of bone and mild bony central canal stenosis without cord compression or epidural extension. A completely collapsed T2 vertebral body with mild retropulsion of bone into spinal canal with moderate central canal stenosis with indentation of the ventral cord. No significant epidural tumor extension present and no edema noted within the adjacent spinal cord. Moderate pathologic compression fracture of T6 without retropulsion of bone/cord compression. Mild compression fracture of T7 without retropulsion of bone. Mild of T8 with mild retropulsion of bone and mild central canal stenosis. Moderate pathologic compression fracture of T10 without retropulsion/compression. Minimal pathologic compression of inferior endplate of T11. Mild pathologic compression involving T12 and L3. Moderate pathologic compression fracture involving L4 stable compared to prior CT. Mild retropulsion of bone present.\par \par Patient had bone marrow biopsy performed among other MM tests which demonstrated plasma cell neoplasm > 70%. \par Hyperdiploidy and gains of chromosomes 3, 5, 9, 11, 15 and 19, as well as, rearrangements of 1p  are recurrently seen in plasma cell disorders.  Complex karyotypic changes are generally associated with an unfavorable clinical course\par FISH myeloma-  Three copies of CCND1 detected (15.5%)\par \par 4/2/20 skeletal survey demonstrated scattered lytic lesions in the calvarium, spine, and pelvis without fracture or dislocation. Multilevel spondylosis and vertebral wedge compression deformities noted.\par \par Patient was then seen by radiation oncologist Dr. Rica Villalpando on 4/24/20.\par \par  [de-identified] : He is seen today for follow up and ocni labs\par Current treatment:  daratumumab kyprolis dexamethasone - (5/5/20-present) \par Yuko  # 15 , W3 of  kyprolis was last week\par \par He feels good overall\par Weight improved\par Has pain from bone metastases but does not want to see rad onc\par \par Weight = 120->121-> 124 lbs -> 128 -> 126 lbs -->124 -> 127  ->134 lbs -> 131 lbs ->136 lbs -> 146 lbs ->147lbs -> 146 ->147lbs.\par \par

## 2020-10-19 NOTE — ASSESSMENT
[FreeTextEntry1] : Relapsed IgG Kappa multiple myeloma \par Diagnosed 2018 s/p RVD x 3 cycles.\par Developed severe skin reaction to revlimid and was supposed to be switched ot pomalyst.\par However did not want stem cell transplant and decided to stop follow up\par Presented Feb 2020 with low back pain found to have diffuse bone metastases\par Bone marrow (3/20): > 70% clonal plasma cells\par Hyperdiploidy and gains of chromosomes 3, 5, 9, 11, 15 and 19, as well as, rearrangements of 1p  are recurrently seen in plasma cell disorders.  Complex karyotypic changes are generally associated with an unfavorable clinical course\par FISH myeloma-  Three copies of CCND1 detected (15.5%)\par \par Here for coni labs\par Feels good except for bone pains\par On daratumumab kyprolis dexamethasone ( 5/5/20-present)\par Tolerating well\par Overall has excellent improvement in clinical and lab parameters Although he is not entirely convinced\par Has gained weight\par Labs reviewed and discussed\par Paraproteins trending down\par Acyclovir 400mg bid. \par \par Physical deconditioning  - to start physical therapy\par \par Lower and upper back pain - Rib pain - pet/ct scan denied. bone scan as stated above. Pain improved.\par Bone metastases\par Completed Radiation C6-T3 spine, completed 5/20/20\par continue with Oxycodone 5mg prn.\par Xgeva 9/10/20 - stressed to take Ca 600mg bid.- Patient with left upper molar pain - holding off Xgeva this month until patient go for dental check up\par \par Follow up in 1 week for briseida 16 with W1 of kyprolis\par CBC, CMP\par \par Contact\par Nadia Robert (sister) - [phone]\par \par

## 2020-10-22 ENCOUNTER — RESULT REVIEW (OUTPATIENT)
Age: 60
End: 2020-10-22

## 2020-10-22 ENCOUNTER — APPOINTMENT (OUTPATIENT)
Dept: HEMATOLOGY ONCOLOGY | Facility: CLINIC | Age: 60
End: 2020-10-22
Payer: MEDICAID

## 2020-10-22 VITALS
SYSTOLIC BLOOD PRESSURE: 162 MMHG | OXYGEN SATURATION: 97 % | BODY MASS INDEX: 22.91 KG/M2 | HEART RATE: 109 BPM | TEMPERATURE: 98.2 F | HEIGHT: 66.97 IN | RESPIRATION RATE: 18 BRPM | DIASTOLIC BLOOD PRESSURE: 94 MMHG | WEIGHT: 146 LBS

## 2020-10-22 PROCEDURE — 99214 OFFICE O/P EST MOD 30 MIN: CPT

## 2020-10-22 NOTE — ASSESSMENT
[FreeTextEntry1] : Relapsed IgG Kappa multiple myeloma \par Diagnosed 2018 s/p RVD x 3 cycles.\par Developed severe skin reaction to revlimid and was supposed to be switched ot pomalyst.\par However did not want stem cell transplant and decided to stop follow up\par Presented Feb 2020 with low back pain found to have diffuse bone metastases\par Bone marrow (3/20): > 70% clonal plasma cells\par Hyperdiploidy and gains of chromosomes 3, 5, 9, 11, 15 and 19, as well as, rearrangements of 1p  are recurrently seen in plasma cell disorders.  Complex karyotypic changes are generally associated with an unfavorable clinical course\par FISH myeloma-  Three copies of CCND1 detected (15.5%)\par \par On daratumumab kyprolis dexamethasone ( 5/5/20-present) \par Now on Yuko #16, W1 of Kyprolis.\par Tolerating well\par Overall has excellent improvement in clinical and lab parameters Although he is not entirely convinced\par Has gained weight\par Labs reviewed and discussed\par Paraproteins trending down\par Acyclovir 400mg bid - stressed the importance of taking them. New script sent in.\par \par \par Lower and upper back pain - Rib pain - pet/ct scan denied. bone scan as stated above. Pain improved.\par Bone metastases\par Completed Radiation C6-T3 spine, completed 5/20/20\par continue with Oxycodone 5mg prn.\par Xgeva given 10/22/20 - stressed to take Ca 600mg bid.- Patient with left upper molar pain but wants to hold dental procedure. \par \par d/w Dr. Quinones\par Follow up in 1 week for  W2 of kyprolis\par CBC, CMP\par \par Contact\par Nadia Robert (sister) - [phone]\par \par

## 2020-10-22 NOTE — HISTORY OF PRESENT ILLNESS
[de-identified] : Mr. Rolf Robert is 60 year old male with IgG Kappa multiple myeloma transferred care from Dr. Amy Vo. \par \par Patient initially diagnosed with Multiple myeloma in 2018 and received 3 cycles of RVD but developed a severe skin reaction and was switched to Pomalyst/Pomalidomide.   \par After 2 months he was recommended stem cell transplant-> he was completely against transplant and was lost to follow up\par \par In Feb 2020, he developed low back pain radiating down to both hips. CT Pelvis done demonstrating severe joint space narrowing involving the right hip, moderate osteophytic changes on the left. Abnormal appearance of osseous structures of the pelvis with multiple lytic foci throughout the pelvis with a permeative appearance of the cortex of bilateral iliac bones. Mild compression deformity at L4 age indeterminant noted. \par B/l hips pain persisted with 3/2020 X-ray of pelvis noted multifocal lytic lesions throughout the bone.  PT was then by neurosurgery on 3/30/20 who recommended rad onc with MRI on 3/31/20 noted moderate compression fracture deformity involving C7 vertebral body with retropulsion of bone and mild bony central canal stenosis without cord compression or epidural extension. A completely collapsed T2 vertebral body with mild retropulsion of bone into spinal canal with moderate central canal stenosis with indentation of the ventral cord. No significant epidural tumor extension present and no edema noted within the adjacent spinal cord. Moderate pathologic compression fracture of T6 without retropulsion of bone/cord compression. Mild compression fracture of T7 without retropulsion of bone. Mild of T8 with mild retropulsion of bone and mild central canal stenosis. Moderate pathologic compression fracture of T10 without retropulsion/compression. Minimal pathologic compression of inferior endplate of T11. Mild pathologic compression involving T12 and L3. Moderate pathologic compression fracture involving L4 stable compared to prior CT. Mild retropulsion of bone present.\par \par Patient had bone marrow biopsy performed among other MM tests which demonstrated plasma cell neoplasm > 70%. \par Hyperdiploidy and gains of chromosomes 3, 5, 9, 11, 15 and 19, as well as, rearrangements of 1p  are recurrently seen in plasma cell disorders.  Complex karyotypic changes are generally associated with an unfavorable clinical course\par FISH myeloma-  Three copies of CCND1 detected (15.5%)\par \par 4/2/20 skeletal survey demonstrated scattered lytic lesions in the calvarium, spine, and pelvis without fracture or dislocation. Multilevel spondylosis and vertebral wedge compression deformities noted.\par \par Patient was then seen by radiation oncologist Dr. Rica Villalpando on 4/24/20.\par \par  [de-identified] : He is seen today for follow up and coni labs\par Current treatment:  daratumumab kyprolis dexamethasone - (5/5/20-present) \par Now on Yuko  # 16 , W1 of  kyprolis  (3 weeks on and one 1 week off)\par \par Patient reports of still having intermittent low back pain, takes 1 Oxycodone prn (about 1 tablet per day). He still has weakness of b/l lower legs but doesn’t want to start PT. Pt has not been taking his Acyclovir, Calcium, or b12 supplement as he does not have them any more.  He has upper molars pain but wants to hold off on seeing the dentist in the meantime since it's not affecting his appetite. Patient wants to continue with Xgeva. \par \par Weight = 120->121-> 124 lbs -> 128 -> 126 lbs -->124 -> 127  ->134 lbs -> 131 lbs ->136 lbs -> 146 lbs ->147lbs -> 146 ->147lbs.\par \par

## 2020-10-29 ENCOUNTER — RESULT REVIEW (OUTPATIENT)
Age: 60
End: 2020-10-29

## 2020-10-29 ENCOUNTER — APPOINTMENT (OUTPATIENT)
Dept: HEMATOLOGY ONCOLOGY | Facility: CLINIC | Age: 60
End: 2020-10-29
Payer: MEDICAID

## 2020-10-29 VITALS
TEMPERATURE: 98.1 F | OXYGEN SATURATION: 100 % | SYSTOLIC BLOOD PRESSURE: 163 MMHG | HEART RATE: 107 BPM | RESPIRATION RATE: 18 BRPM | WEIGHT: 145.5 LBS | BODY MASS INDEX: 22.84 KG/M2 | HEIGHT: 66.97 IN | DIASTOLIC BLOOD PRESSURE: 99 MMHG

## 2020-10-29 PROCEDURE — 99215 OFFICE O/P EST HI 40 MIN: CPT

## 2020-10-29 PROCEDURE — 99072 ADDL SUPL MATRL&STAF TM PHE: CPT

## 2020-10-29 NOTE — HISTORY OF PRESENT ILLNESS
[de-identified] : Mr. Rolf Robert is 60 year old male with IgG Kappa multiple myeloma transferred care from Dr. Amy Vo. \par \par Patient initially diagnosed with Multiple myeloma in 2018 and received 3 cycles of RVD but developed a severe skin reaction and was switched to Pomalyst/Pomalidomide.   \par After 2 months he was recommended stem cell transplant-> he was completely against transplant and was lost to follow up\par \par In Feb 2020, he developed low back pain radiating down to both hips. CT Pelvis done demonstrating severe joint space narrowing involving the right hip, moderate osteophytic changes on the left. Abnormal appearance of osseous structures of the pelvis with multiple lytic foci throughout the pelvis with a permeative appearance of the cortex of bilateral iliac bones. Mild compression deformity at L4 age indeterminant noted. \par B/l hips pain persisted with 3/2020 X-ray of pelvis noted multifocal lytic lesions throughout the bone.  PT was then by neurosurgery on 3/30/20 who recommended rad onc with MRI on 3/31/20 noted moderate compression fracture deformity involving C7 vertebral body with retropulsion of bone and mild bony central canal stenosis without cord compression or epidural extension. A completely collapsed T2 vertebral body with mild retropulsion of bone into spinal canal with moderate central canal stenosis with indentation of the ventral cord. No significant epidural tumor extension present and no edema noted within the adjacent spinal cord. Moderate pathologic compression fracture of T6 without retropulsion of bone/cord compression. Mild compression fracture of T7 without retropulsion of bone. Mild of T8 with mild retropulsion of bone and mild central canal stenosis. Moderate pathologic compression fracture of T10 without retropulsion/compression. Minimal pathologic compression of inferior endplate of T11. Mild pathologic compression involving T12 and L3. Moderate pathologic compression fracture involving L4 stable compared to prior CT. Mild retropulsion of bone present.\par \par Patient had bone marrow biopsy performed among other MM tests which demonstrated plasma cell neoplasm > 70%. \par Hyperdiploidy and gains of chromosomes 3, 5, 9, 11, 15 and 19, as well as, rearrangements of 1p  are recurrently seen in plasma cell disorders.  Complex karyotypic changes are generally associated with an unfavorable clinical course\par FISH myeloma-  Three copies of CCND1 detected (15.5%)\par \par 4/2/20 skeletal survey demonstrated scattered lytic lesions in the calvarium, spine, and pelvis without fracture or dislocation. Multilevel spondylosis and vertebral wedge compression deformities noted.\par \par Patient was then seen by radiation oncologist Dr. Rica Villalpando on 4/24/20.\par \par  [de-identified] : He is seen today for follow up and kyprolis W2\par Current treatment:  daratumumab kyprolis dexamethasone - (5/5/20-present) \par Now on Yuko  # 16 , W1 of  kyprolis  (3 weeks on and one 1 week off)\par \par Patient reports of still having intermittent low back pain, takes 1 Oxycodone prn (about 1 tablet per day).\par Feels tired but does not want to see physical therapy\par He does not want to see rad onc\par \par Weight = 120->121-> 124 lbs -> 128 -> 126 lbs -->124 -> 127  ->134 lbs -> 131 lbs ->136 lbs -> 146 lbs ->147lbs -> 146 ->147lbs.\par \par

## 2020-10-29 NOTE — ASSESSMENT
[FreeTextEntry1] : Relapsed IgG Kappa multiple myeloma \par Diagnosed 2018 s/p RVD x 3 cycles.\par Developed severe skin reaction to revlimid and was supposed to be switched ot pomalyst.\par However did not want stem cell transplant and decided to stop follow up\par Presented Feb 2020 with low back pain found to have diffuse bone metastases\par Bone marrow (3/20): > 70% clonal plasma cells\par Hyperdiploidy and gains of chromosomes 3, 5, 9, 11, 15 and 19, as well as, rearrangements of 1p  are recurrently seen in plasma cell disorders.  Complex karyotypic changes are generally associated with an unfavorable clinical course\par FISH myeloma-  Three copies of CCND1 detected (15.5%)\par \par On daratumumab kyprolis dexamethasone ( 5/5/20-present) \par Here for W2 of Kyprolis\par Tolerating well\par Overall has excellent improvement in clinical and lab parameters Although he is not entirely convinced\par Labs reviewed and discussed\par Explained to him about the monitoring parameters and how they look much better\par Acyclovir 400mg bid \par \par Lower and upper back pain \par Rib pain \par Bone metastases\par pet/ct scan denied. bone scan as stated above\par Advised again to follow up with rad onc\par Completed Radiation C6-T3 spine, completed 5/20/20\par continue with Oxycodone 5mg prn.\par Xgeva \par Stressed to take Ca 600mg bid.- Patient with left upper molar pain but wants to hold dental procedure. \par \par Follow up in 1 week for Yuko 17 and  W3 of kyprolis followed by 1 week off\par CBC, CMP, myeloma panel \par \par Contact\par Nadia Robert (sister) - [phone]\par \par

## 2020-11-05 ENCOUNTER — RESULT REVIEW (OUTPATIENT)
Age: 60
End: 2020-11-05

## 2020-11-05 ENCOUNTER — APPOINTMENT (OUTPATIENT)
Dept: HEMATOLOGY ONCOLOGY | Facility: CLINIC | Age: 60
End: 2020-11-05
Payer: MEDICAID

## 2020-11-05 VITALS
HEIGHT: 66.97 IN | BODY MASS INDEX: 23.54 KG/M2 | TEMPERATURE: 98.5 F | HEART RATE: 96 BPM | SYSTOLIC BLOOD PRESSURE: 160 MMHG | DIASTOLIC BLOOD PRESSURE: 106 MMHG | RESPIRATION RATE: 18 BRPM | OXYGEN SATURATION: 99 % | WEIGHT: 150 LBS

## 2020-11-05 PROCEDURE — 99072 ADDL SUPL MATRL&STAF TM PHE: CPT

## 2020-11-05 PROCEDURE — 99214 OFFICE O/P EST MOD 30 MIN: CPT

## 2020-11-05 NOTE — ASSESSMENT
[FreeTextEntry1] : Relapsed IgG Kappa multiple myeloma \par Diagnosed 2018 s/p RVD x 3 cycles.\par Developed severe skin reaction to revlimid and was supposed to be switched ot pomalyst.\par However did not want stem cell transplant and decided to stop follow up\par Presented Feb 2020 with low back pain found to have diffuse bone metastases\par Bone marrow (3/20): > 70% clonal plasma cells\par Hyperdiploidy and gains of chromosomes 3, 5, 9, 11, 15 and 19, as well as, rearrangements of 1p  are recurrently seen in plasma cell disorders.  Complex karyotypic changes are generally associated with an unfavorable clinical course\par FISH myeloma-  Three copies of CCND1 detected (15.5%)\par \par #On daratumumab kyprolis dexamethasone ( 5/5/20-present) \par Here for W3 of Kyprolis\par Tolerating well\par Overall has excellent improvement in clinical and lab parameters Although he is not entirely convinced\par Labs reviewed and discussed\par Explained to him about the monitoring parameters and how they look much better\par Acyclovir 400mg bid \par \par #Lower and upper back pain - 2/2 Bone metastases\par pet/ct scan denied. bone scan as stated above\par Advised again to follow up with rad onc\par Completed Radiation C6-T3 spine, completed 5/20/20\par continue with Oxycodone 5mg prn.\par Xgeva given 10/22/20\par Stressed to take Ca 600mg bid.- Patient with left upper molar pain but wants to hold dental procedure. \par \par #HTN - on Amlodipine 5mg - compliant issues advised. \par \par d/w Dr. Quinones \par Follow up in 2 weeks for Yuko #18 and  W1 of kyprolis \par CBC, CMP\par \par Contact\par Nadia Robert (sister) - [phone]\par \par

## 2020-11-05 NOTE — HISTORY OF PRESENT ILLNESS
[de-identified] : Mr. Rolf Robert is 60 year old male with IgG Kappa multiple myeloma transferred care from Dr. Amy Vo. \par \par Patient initially diagnosed with Multiple myeloma in 2018 and received 3 cycles of RVD but developed a severe skin reaction and was switched to Pomalyst/Pomalidomide.   \par After 2 months he was recommended stem cell transplant-> he was completely against transplant and was lost to follow up\par \par In Feb 2020, he developed low back pain radiating down to both hips. CT Pelvis done demonstrating severe joint space narrowing involving the right hip, moderate osteophytic changes on the left. Abnormal appearance of osseous structures of the pelvis with multiple lytic foci throughout the pelvis with a permeative appearance of the cortex of bilateral iliac bones. Mild compression deformity at L4 age indeterminant noted. \par B/l hips pain persisted with 3/2020 X-ray of pelvis noted multifocal lytic lesions throughout the bone.  PT was then by neurosurgery on 3/30/20 who recommended rad onc with MRI on 3/31/20 noted moderate compression fracture deformity involving C7 vertebral body with retropulsion of bone and mild bony central canal stenosis without cord compression or epidural extension. A completely collapsed T2 vertebral body with mild retropulsion of bone into spinal canal with moderate central canal stenosis with indentation of the ventral cord. No significant epidural tumor extension present and no edema noted within the adjacent spinal cord. Moderate pathologic compression fracture of T6 without retropulsion of bone/cord compression. Mild compression fracture of T7 without retropulsion of bone. Mild of T8 with mild retropulsion of bone and mild central canal stenosis. Moderate pathologic compression fracture of T10 without retropulsion/compression. Minimal pathologic compression of inferior endplate of T11. Mild pathologic compression involving T12 and L3. Moderate pathologic compression fracture involving L4 stable compared to prior CT. Mild retropulsion of bone present.\par \par Patient had bone marrow biopsy performed among other MM tests which demonstrated plasma cell neoplasm > 70%. \par Hyperdiploidy and gains of chromosomes 3, 5, 9, 11, 15 and 19, as well as, rearrangements of 1p  are recurrently seen in plasma cell disorders.  Complex karyotypic changes are generally associated with an unfavorable clinical course\par FISH myeloma-  Three copies of CCND1 detected (15.5%)\par \par 4/2/20 skeletal survey demonstrated scattered lytic lesions in the calvarium, spine, and pelvis without fracture or dislocation. Multilevel spondylosis and vertebral wedge compression deformities noted.\par \par Patient was then seen by radiation oncologist Dr. Rica Villalpando on 4/24/20.\par \par  [de-identified] : He is seen today for follow up \par Current treatment:  daratumumab kyprolis dexamethasone - (5/5/20-present) \par Now on Yuko  # 17 , W3 of  kyprolis  (3 weeks on and one 1 week off)\par \par Patient reports of still having persistent right hip pain and weakness on b/l  lower legs with some knee pain. He has yet started physical therapy and only taking Oxycodone as needed. Patient is otherwise doing well, continues to have good appetite and weight gained. Denies n/v, fever, headaches, dizziness, chest pain/palpitation, rash, SOB/HILLS. \par \par Weight = 120->121-> 124 lbs -> 128 -> 126 lbs -->124 -> 127  ->134 lbs -> 131 lbs ->136 lbs -> 146 lbs ->147lbs -> 146 ->147lbs ->150lbs\par \par

## 2020-11-19 ENCOUNTER — RESULT REVIEW (OUTPATIENT)
Age: 60
End: 2020-11-19

## 2020-11-19 ENCOUNTER — APPOINTMENT (OUTPATIENT)
Dept: HEMATOLOGY ONCOLOGY | Facility: CLINIC | Age: 60
End: 2020-11-19
Payer: MEDICAID

## 2020-11-19 VITALS
HEIGHT: 66.97 IN | BODY MASS INDEX: 23.26 KG/M2 | WEIGHT: 148.2 LBS | RESPIRATION RATE: 18 BRPM | DIASTOLIC BLOOD PRESSURE: 102 MMHG | SYSTOLIC BLOOD PRESSURE: 140 MMHG | HEART RATE: 107 BPM | OXYGEN SATURATION: 98 % | TEMPERATURE: 98.5 F

## 2020-11-19 PROCEDURE — 99215 OFFICE O/P EST HI 40 MIN: CPT

## 2020-11-19 NOTE — HISTORY OF PRESENT ILLNESS
[de-identified] : Mr. Rolf Robert is 60 year old male with IgG Kappa multiple myeloma transferred care from Dr. Amy Vo. \par \par Patient initially diagnosed with Multiple myeloma in 2018 and received 3 cycles of RVD but developed a severe skin reaction and was switched to Pomalyst/Pomalidomide.   \par After 2 months he was recommended stem cell transplant-> he was completely against transplant and was lost to follow up\par \par In Feb 2020, he developed low back pain radiating down to both hips. CT Pelvis done demonstrating severe joint space narrowing involving the right hip, moderate osteophytic changes on the left. Abnormal appearance of osseous structures of the pelvis with multiple lytic foci throughout the pelvis with a permeative appearance of the cortex of bilateral iliac bones. Mild compression deformity at L4 age indeterminant noted. \par B/l hips pain persisted with 3/2020 X-ray of pelvis noted multifocal lytic lesions throughout the bone.  PT was then by neurosurgery on 3/30/20 who recommended rad onc with MRI on 3/31/20 noted moderate compression fracture deformity involving C7 vertebral body with retropulsion of bone and mild bony central canal stenosis without cord compression or epidural extension. A completely collapsed T2 vertebral body with mild retropulsion of bone into spinal canal with moderate central canal stenosis with indentation of the ventral cord. No significant epidural tumor extension present and no edema noted within the adjacent spinal cord. Moderate pathologic compression fracture of T6 without retropulsion of bone/cord compression. Mild compression fracture of T7 without retropulsion of bone. Mild of T8 with mild retropulsion of bone and mild central canal stenosis. Moderate pathologic compression fracture of T10 without retropulsion/compression. Minimal pathologic compression of inferior endplate of T11. Mild pathologic compression involving T12 and L3. Moderate pathologic compression fracture involving L4 stable compared to prior CT. Mild retropulsion of bone present.\par \par Patient had bone marrow biopsy performed among other MM tests which demonstrated plasma cell neoplasm > 70%. \par Hyperdiploidy and gains of chromosomes 3, 5, 9, 11, 15 and 19, as well as, rearrangements of 1p  are recurrently seen in plasma cell disorders.  Complex karyotypic changes are generally associated with an unfavorable clinical course\par FISH myeloma-  Three copies of CCND1 detected (15.5%)\par \par 4/2/20 skeletal survey demonstrated scattered lytic lesions in the calvarium, spine, and pelvis without fracture or dislocation. Multilevel spondylosis and vertebral wedge compression deformities noted.\par \par Patient was then seen by radiation oncologist Dr. Rica Villalpando on 4/24/20.\par \par  [de-identified] : He is seen today for follow up \par Current treatment:  daratumumab kyprolis dexamethasone - (5/5/20-present) \par Now on Yuko  # 18 , W1 of  kyprolis  (3 weeks on and one 1 week off)\par \par Patient is doing well but still concerns with intermittent right hip and low back pain despite that he's hardly taking any pain medication and has yet started PT. Patient wants to go on trials but not sure where to start and upset we're not putting in efforts reaching out to trials that he can participate in. He reports of taking his Amlodipine 5 mg every day.  His appetite remains good and gained weight since June 2020 but not happy that weight is mostly distributed on his abdomen. \par Denies n/v, fever, headaches, dizziness, chest pain/palpitation, rash, SOB/HILLS. \par \par Weight = 120->121-> 124 lbs -> 128 -> 126 lbs -->124 -> 127  ->134 lbs -> 131 lbs ->136 lbs -> 146 lbs ->147lbs -> 146 ->147lbs ->150lbs ->148lbs\par \par

## 2020-11-19 NOTE — ASSESSMENT
[FreeTextEntry1] : Relapsed IgG Kappa multiple myeloma \par Diagnosed 2018 s/p RVD x 3 cycles.\par Developed severe skin reaction to revlimid and was supposed to be switched ot pomalyst.\par However did not want stem cell transplant and decided to stop follow up\par Presented Feb 2020 with low back pain found to have diffuse bone metastases\par Bone marrow (3/20): > 70% clonal plasma cells\par Hyperdiploidy and gains of chromosomes 3, 5, 9, 11, 15 and 19, as well as, rearrangements of 1p  are recurrently seen in plasma cell disorders.  Complex karyotypic changes are generally associated with an unfavorable clinical course\par FISH myeloma-  Three copies of CCND1 detected (15.5%)\par \par #On daratumumab kyprolis dexamethasone ( 5/5/20-present) \par Here for yuko # 18 W1 of Kyprolis \par Tolerating well\par Overall has excellent improvement in clinical and lab parameters Although he is not entirely convinced\par Labs reviewed and discussed\par Explained to him about the monitoring parameters and how they look much better\par Acyclovir 400mg bid \par \par #Lower and upper back pain - 2/2 Bone metastases\par pet/ct scan denied. bone scan as stated above\par Advised again to follow up with rad onc\par Completed Radiation C6-T3 spine, completed 5/20/20\par continue with Oxycodone 5mg prn.\par Xgeva given today 11/19/20\par Stressed to take Ca 600mg bid.- Patient with left upper molar pain but wants to hold dental procedure. \par \par #HTN - on Amlodipine 5mg - compliant issues stressed, increasing to 10mg once daily. Advised to follow up with cardiologist.\par \par d/w Dr. Quinones \par Follow up in  weeks for Yuko #18 and  W2 of kyprolis \par CBC, CMP\par \par Contact\par Nadia Robert (sister) - [phone]\par \par

## 2020-11-27 ENCOUNTER — RESULT REVIEW (OUTPATIENT)
Age: 60
End: 2020-11-27

## 2020-11-27 ENCOUNTER — APPOINTMENT (OUTPATIENT)
Dept: HEMATOLOGY ONCOLOGY | Facility: CLINIC | Age: 60
End: 2020-11-27
Payer: MEDICAID

## 2020-11-27 VITALS
WEIGHT: 149 LBS | RESPIRATION RATE: 20 BRPM | TEMPERATURE: 99.3 F | DIASTOLIC BLOOD PRESSURE: 104 MMHG | SYSTOLIC BLOOD PRESSURE: 152 MMHG | BODY MASS INDEX: 23.39 KG/M2 | HEIGHT: 66.97 IN | OXYGEN SATURATION: 98 % | HEART RATE: 108 BPM

## 2020-11-27 PROCEDURE — 99214 OFFICE O/P EST MOD 30 MIN: CPT

## 2020-11-27 RX ORDER — AMLODIPINE BESYLATE 5 MG/1
5 TABLET ORAL DAILY
Qty: 30 | Refills: 3 | Status: DISCONTINUED | COMMUNITY
Start: 2020-05-06 | End: 2020-11-27

## 2020-11-27 NOTE — HISTORY OF PRESENT ILLNESS
[de-identified] : Mr. Rolf Robert is 60 year old male with IgG Kappa multiple myeloma transferred care from Dr. Amy Vo. \par \par Patient initially diagnosed with Multiple myeloma in 2018 and received 3 cycles of RVD but developed a severe skin reaction and was switched to Pomalyst/Pomalidomide.   \par After 2 months he was recommended stem cell transplant-> he was completely against transplant and was lost to follow up\par \par In Feb 2020, he developed low back pain radiating down to both hips. CT Pelvis done demonstrating severe joint space narrowing involving the right hip, moderate osteophytic changes on the left. Abnormal appearance of osseous structures of the pelvis with multiple lytic foci throughout the pelvis with a permeative appearance of the cortex of bilateral iliac bones. Mild compression deformity at L4 age indeterminant noted. \par B/l hips pain persisted with 3/2020 X-ray of pelvis noted multifocal lytic lesions throughout the bone.  PT was then by neurosurgery on 3/30/20 who recommended rad onc with MRI on 3/31/20 noted moderate compression fracture deformity involving C7 vertebral body with retropulsion of bone and mild bony central canal stenosis without cord compression or epidural extension. A completely collapsed T2 vertebral body with mild retropulsion of bone into spinal canal with moderate central canal stenosis with indentation of the ventral cord. No significant epidural tumor extension present and no edema noted within the adjacent spinal cord. Moderate pathologic compression fracture of T6 without retropulsion of bone/cord compression. Mild compression fracture of T7 without retropulsion of bone. Mild of T8 with mild retropulsion of bone and mild central canal stenosis. Moderate pathologic compression fracture of T10 without retropulsion/compression. Minimal pathologic compression of inferior endplate of T11. Mild pathologic compression involving T12 and L3. Moderate pathologic compression fracture involving L4 stable compared to prior CT. Mild retropulsion of bone present.\par \par Patient had bone marrow biopsy performed among other MM tests which demonstrated plasma cell neoplasm > 70%. \par Hyperdiploidy and gains of chromosomes 3, 5, 9, 11, 15 and 19, as well as, rearrangements of 1p  are recurrently seen in plasma cell disorders.  Complex karyotypic changes are generally associated with an unfavorable clinical course\par FISH myeloma-  Three copies of CCND1 detected (15.5%)\par \par 4/2/20 skeletal survey demonstrated scattered lytic lesions in the calvarium, spine, and pelvis without fracture or dislocation. Multilevel spondylosis and vertebral wedge compression deformities noted.\par \par Patient was then seen by radiation oncologist Dr. Rica Villalpando on 4/24/20.\par \par  [de-identified] : He is seen today for follow up \par Current treatment:  daratumumab kyprolis dexamethasone - (5/5/20-present) \par Now on Yuko  # 18 , W2  of  kyprolis  (3 weeks on and one 1 week off)\par \par Patient states that he's interested in having bone marrow transplant once his disease is stable, which is at the moment. Patient reports of still having chronic low pain along with intermittent legs pain (mostly on his left knee and shint) - taking Oxycodone as needed, up to two tablets per day. Still has not purse PT. Patient also reports of polyuria but no dysuria, hematuria. \par Denies n/v, fever, headaches, dizziness, chest pain/palpitation, rash, SOB/HILLS. \par \par Weight = 120->121-> 124 lbs -> 128 -> 126 lbs -->124 -> 127  ->134 lbs -> 131 lbs ->136 lbs -> 146 lbs ->147lbs -> 146 ->147lbs ->150lbs ->148lbs ->149lbs\par \par

## 2020-11-27 NOTE — ASSESSMENT
[FreeTextEntry1] : Relapsed IgG Kappa multiple myeloma \par Diagnosed 2018 s/p RVD x 3 cycles.\par Developed severe skin reaction to revlimid and was supposed to be switched ot pomalyst.\par However did not want stem cell transplant and decided to stop follow up\par Presented Feb 2020 with low back pain found to have diffuse bone metastases\par Bone marrow (3/20): > 70% clonal plasma cells\par Hyperdiploidy and gains of chromosomes 3, 5, 9, 11, 15 and 19, as well as, rearrangements of 1p  are recurrently seen in plasma cell disorders.  Complex karyotypic changes are generally associated with an unfavorable clinical course\par FISH myeloma-  Three copies of CCND1 detected (15.5%)\par \par #On daratumumab kyprolis dexamethasone ( 5/5/20-present) \par Here for yuko # 18  W2 of Kyprolis \par Tolerating well\par Overall has excellent improvement in clinical and lab parameters Although he is not entirely convinced\par Wants to pursue bone marrow transplant in the future.\par Labs reviewed and discussed\par Explained to him about the monitoring parameters and how they look much better\par Acyclovir 400mg bid \par \par #Lower and upper back pain - 2/2 Bone metastases\par pet/ct scan denied. bone scan as stated above\par Completed Radiation C6-T3 spine, completed 5/20/20\par continue with Oxycodone 5mg prn.\par Xgeva given today 11/19/20\par Stressed to take Ca 600mg bid.- Patient with left upper molar pain but wants to hold dental procedure. \par \par #HTN - on Amlodipine 5mg - increasing to 10mg once daily.  continue \par #polyuria - no other urology symptoms - will monitor. \par \par d/w Dr. Quinones \par Follow up in  weeks for Yuko #19 and  W3 of kyprolis \par CBC, CMP\par \par Contact\par CharlaRobertoRaya Robert (sister) - [phone]\par \par

## 2020-12-03 ENCOUNTER — APPOINTMENT (OUTPATIENT)
Dept: HEMATOLOGY ONCOLOGY | Facility: CLINIC | Age: 60
End: 2020-12-03
Payer: MEDICAID

## 2020-12-03 ENCOUNTER — RESULT REVIEW (OUTPATIENT)
Age: 60
End: 2020-12-03

## 2020-12-03 VITALS
DIASTOLIC BLOOD PRESSURE: 126 MMHG | RESPIRATION RATE: 18 BRPM | HEART RATE: 108 BPM | HEIGHT: 66.97 IN | WEIGHT: 144 LBS | TEMPERATURE: 99.1 F | BODY MASS INDEX: 22.6 KG/M2 | SYSTOLIC BLOOD PRESSURE: 158 MMHG | OXYGEN SATURATION: 99 %

## 2020-12-03 PROCEDURE — 99072 ADDL SUPL MATRL&STAF TM PHE: CPT

## 2020-12-03 PROCEDURE — 99215 OFFICE O/P EST HI 40 MIN: CPT

## 2020-12-03 NOTE — ASSESSMENT
[FreeTextEntry1] : Relapsed IgG Kappa multiple myeloma \par Diagnosed 2018 s/p RVD x 3 cycles.\par Developed severe skin reaction to revlimid and was supposed to be switched ot pomalyst.\par However did not want stem cell transplant and decided to stop follow up\par Presented Feb 2020 with low back pain found to have diffuse bone metastases\par Bone marrow (3/20): > 70% clonal plasma cells\par Hyperdiploidy and gains of chromosomes 3, 5, 9, 11, 15 and 19, as well as, rearrangements of 1p  are recurrently seen in plasma cell disorders.  Complex karyotypic changes are generally associated with an unfavorable clinical course\par FISH myeloma-  Three copies of CCND1 detected (15.5%)\par \par #On daratumumab kyprolis dexamethasone ( 5/5/20-present) \par Here for W3 of Kyprolis \par Tolerating well\par Overall has excellent improvement in clinical and lab parameters Although he is not entirely convinced\par Labs reviewed and discussed\par Explained to him about the monitoring parameters and how they look much better\par Acyclovir 400mg bid \par He is now on daratumumab G0sivtu and kyprolis 3 weeks on 1 week off\par \par Hypertension\par Non compliant with meds\par Reinforced the need to be complaint with meds\par Amlodipine 10 mg given in the infusion center\par Needs to establish with PCP\par Refer to Dr Aries Cr or Dr Desiree Cr in [place]\par \par #Lower and upper back pain - 2/2 Bone metastases\par pet/ct scan denied. bone scan as stated above\par Completed Radiation C6-T3 spine, completed 5/20/20\par continue with Oxycodone 5mg prn.\par Xgeva given today 11/19/20\par Stressed to take Ca 600mg bid.\par \par #polyuria - no other urology symptoms - will monitor. \par \par Follow up in 2 weeks for Yuko and  W1 of kyprolis \par CBC, CMP, myeloma panel\par \par Contact\par Nadia Robert (sister) - [phone]\par \par

## 2020-12-03 NOTE — CONSULT LETTER
[Dear  ___] : Dear  [unfilled], [Courtesy Letter:] : I had the pleasure of seeing your patient, [unfilled], in my office today. [Please see my note below.] : Please see my note below. [Consult Closing:] : Thank you very much for allowing me to participate in the care of this patient.  If you have any questions, please do not hesitate to contact me. [Sincerely,] : Sincerely, [FreeTextEntry3] : Aubrey Quinones MD, MPH\par Attending Physician\par Hematology Oncology\par [facility] [place]\par [facility]\par

## 2020-12-03 NOTE — HISTORY OF PRESENT ILLNESS
[de-identified] : Mr. Rolf Robert is 60 year old male with IgG Kappa multiple myeloma transferred care from Dr. Amy Vo. \par \par Patient initially diagnosed with Multiple myeloma in 2018 and received 3 cycles of RVD but developed a severe skin reaction and was switched to Pomalyst/Pomalidomide.   \par After 2 months he was recommended stem cell transplant-> he was completely against transplant and was lost to follow up\par \par In Feb 2020, he developed low back pain radiating down to both hips. CT Pelvis done demonstrating severe joint space narrowing involving the right hip, moderate osteophytic changes on the left. Abnormal appearance of osseous structures of the pelvis with multiple lytic foci throughout the pelvis with a permeative appearance of the cortex of bilateral iliac bones. Mild compression deformity at L4 age indeterminant noted. \par B/l hips pain persisted with 3/2020 X-ray of pelvis noted multifocal lytic lesions throughout the bone.  PT was then by neurosurgery on 3/30/20 who recommended rad onc with MRI on 3/31/20 noted moderate compression fracture deformity involving C7 vertebral body with retropulsion of bone and mild bony central canal stenosis without cord compression or epidural extension. A completely collapsed T2 vertebral body with mild retropulsion of bone into spinal canal with moderate central canal stenosis with indentation of the ventral cord. No significant epidural tumor extension present and no edema noted within the adjacent spinal cord. Moderate pathologic compression fracture of T6 without retropulsion of bone/cord compression. Mild compression fracture of T7 without retropulsion of bone. Mild of T8 with mild retropulsion of bone and mild central canal stenosis. Moderate pathologic compression fracture of T10 without retropulsion/compression. Minimal pathologic compression of inferior endplate of T11. Mild pathologic compression involving T12 and L3. Moderate pathologic compression fracture involving L4 stable compared to prior CT. Mild retropulsion of bone present.\par \par Patient had bone marrow biopsy performed among other MM tests which demonstrated plasma cell neoplasm > 70%. \par Hyperdiploidy and gains of chromosomes 3, 5, 9, 11, 15 and 19, as well as, rearrangements of 1p  are recurrently seen in plasma cell disorders.  Complex karyotypic changes are generally associated with an unfavorable clinical course\par FISH myeloma-  Three copies of CCND1 detected (15.5%)\par \par 4/2/20 skeletal survey demonstrated scattered lytic lesions in the calvarium, spine, and pelvis without fracture or dislocation. Multilevel spondylosis and vertebral wedge compression deformities noted.\par \par Patient was then seen by radiation oncologist Dr. Rica Villalpando on 4/24/20.\par \par  [de-identified] : He is seen today for follow up  and Day 15 kyprolis dex\par Current treatment:  daratumumab kyprolis dexamethasone - (5/5/20-present) \par Now on Yuko  # 19 , W2  of  kyprolis  (3 weeks on and one 1 week off)\par \par He lost his BP meds./130.\par Will send the prescription again to his pharmacy\par Reiterated the need to continue BP meds. Also advised him to establish with PCP\par \par Weight = 120->121-> 124 lbs -> 128 -> 126 lbs -->124 -> 127  ->134 lbs -> 131 lbs ->136 lbs -> 146 lbs ->147lbs -> 146 ->147lbs ->150lbs ->148lbs ->149lbs -> 144 lbs\par \par

## 2020-12-17 ENCOUNTER — APPOINTMENT (OUTPATIENT)
Dept: HEMATOLOGY ONCOLOGY | Facility: CLINIC | Age: 60
End: 2020-12-17

## 2020-12-23 NOTE — ED ADULT NURSE NOTE - NS ED NOTE ABUSE SUSPICION NEGLECT YN
DIABETES MELLITUS FOLOW-UP  Subjective:      Chief Complaint   Patient presents with    Diabetes     Santos Hardwick is an 80 y.o. female who presents for follow up of following chronic problems:  1. Type 2 diabetes mellitus without complication, without long-term current use of insulin ([address]    2. Hyperlipidemia LDL goal <100    3. Iron deficiency anemia, unspecified iron deficiency anemia type      Complaints: [employer] sent a visiting nurse out pt states her bp is creeping up- checked once and it is 157/104. She is not going to be taking the bladder medication anymore due to cost.    · Patient checks sugars 0  time(s) daily. · Any low sugars? No  · Patent follows diabetic diet? No  · Exercise: walking intermittently  · Taking medicines daily as directed? Yes  · Is the patient reporting any side effects of medications? No  · Patient checks bottom of feet daily? Yes  · Tobacco history updated:  reports that she has never smoked. She has never used smokeless tobacco.    Review of Systems   General ROS: fever? No,   Night sweats? No  Ophthalmic ROS: change in vision? No  Endocrine ROS: fatigue? No  Unexpected weight changes? No  Respiratory ROS: Shortness of breath? No  Cardiovascular ROS: chest pain? No  Gastrointestinal ROS: abdominal pain? No  Change in stools? No  Genito-Urinary ROS: painful urination? No  Trouble urinating? No  Neurological ROS: TIA or stroke symptoms? No  Numbness/tingling? No  Dermatological ROS: rash or sores on feet? No  Changes in skin spots?     No    Health Maintenance Due   Topic Date Due    Annual Wellness Visit (AWV)  05/29/2019    Diabetic foot exam  12/27/2020    Lipid screen  12/30/2020     *Chief complaint, HPI, History and ROS provided by the medical assistant has been reviewed and verified by provider- Rogue Holstein MD    LAST LABS  LDL Calculated   Date Value Ref Range Status   12/30/2019 54 <100 mg/dL Final     Lab Results Component Value Date    HDL 90 (H) 12/30/2019     Lab Results   Component Value Date    TRIG 58 12/30/2019     Lab Results   Component Value Date    GLUCOSE 182 (H) 08/03/2020     Lab Results   Component Value Date     (H) 08/03/2020    K 4.4 08/03/2020    CREATININE 0.6 08/03/2020     Lab Results   Component Value Date    WBC 4.5 10/15/2020    HGB 12.6 10/15/2020     10/15/2020     Lab Results   Component Value Date    ALT 15 12/30/2019    AST 16 12/30/2019    ALKPHOS 93 12/30/2019    BILITOT 1.0 12/30/2019     TSH (uIU/mL)   Date Value   08/03/2020 1.12     Lab Results   Component Value Date    LABA1C 6.8 08/03/2020    LABA1C 7.8 12/27/2019    LABA1C 7.1 09/25/2019     REVIEW DOCUMENTATION: labs reviewed, I note that labs due    HISTORY:  Patient's medications, allergies, past medical, and social histories were reviewed and updated as appropriate. CHART REVIEW  Health Maintenance Due   Topic Date Due    Annual Wellness Visit (AWV)  05/29/2019    Diabetic foot exam  12/27/2020    Lipid screen  12/30/2020     Social History     Tobacco Use    Smoking status: Never Smoker    Smokeless tobacco: Never Used    Tobacco comment: Advised not to start. Substance Use Topics    Alcohol use: Yes     Alcohol/week: 0.0 standard drinks     Comment: light    Drug use: No      The ASCVD Risk score (Dianne Armijo et al., 2013) failed to calculate for the following reasons: The 2013 ASCVD risk score is only valid for ages 36 to 78  Prior to Visit Medications    Medication Sig Taking?  Authorizing Provider   ferrous sulfate (IRON 325) 325 (65 Fe) MG tablet Take 1 tablet by mouth daily (with breakfast) Yes Chana Lance MD   metFORMIN (GLUCOPHAGE-XR) 500 MG extended release tablet Take 2 tablets by mouth 2 times daily Yes Chana Lance MD   Fesoterodine Fumarate ER 8 MG TB24 Take 8 mg by mouth daily Yes Chana Lance MD   atorvastatin (LIPITOR) 80 MG tablet Take 1 tablet by mouth daily Yes Chana Lance MD naproxen sodium (ALEVE) 220 MG tablet Take 550 mg by mouth 2 times daily (with meals) Yes Historical Provider, MD   CPAP Machine MISC by Does not apply route Yes Historical Provider, MD   solifenacin (VESICARE) 10 MG tablet Take 1 tablet by mouth daily. Moni Moncada MD      Family History   Problem Relation Age of Onset    Diabetes Mother     Cancer Mother         SCC, breast, colon polyp    Heart Disease Father     Heart Disease Brother     Cancer Brother         lung, liver     Objective:   PHYSICAL EXAM   /74 (Site: Right Upper Arm, Position: Sitting, Cuff Size: Large Adult)   Pulse 93   Temp 97 °F (36.1 °C) (Temporal)   Resp 16   Ht 5' 6\" (1.676 m)   Wt 167 lb (75.8 kg)   SpO2 98%   BMI 26.95 kg/m²   BP Readings from Last 5 Encounters:   12/23/20 108/74   08/03/20 120/64   12/27/19 122/84   11/19/19 138/70   11/08/19 (!) 136/94     Wt Readings from Last 5 Encounters:   12/23/20 167 lb (75.8 kg)   08/03/20 164 lb (74.4 kg)   12/27/19 170 lb (77.1 kg)   11/19/19 171 lb (77.6 kg)   11/08/19 172 lb (78 kg)      GENERAL:   · well-developed, well-nourished, alert, no distress. EYES:   · External findings: lids and lashes normal and conjunctivae and sclerae normal  LUNGS:    · Breathing unlabored  · clear to auscultation bilaterally and good air movement  CARDIOVASC:   · regular rate and rhythm  · LEGS:  Lower extremity edema: none    SKIN: warm and dry  PSYCH:    · Alert and oriented  · Normal reasoning, insight good  · Facial expressions full, mood appropriate  · No memory disturbance noted  MUSCULOSKEL:    · No significant finger or nail findings  NEURO:   · CN 2-12 intact  Diabetic Foot Exam  · Foot exam reveals normal cap refill  · Feet normal skin color, no callouses, no ulcers, no venous stasis  · Feet- no deformities  · sensation grossly normal to light touch in feet.   FEET:Monofilament Sensation:  normal      Assessment and Plan:      Diagnosis Orders 1. Type 2 diabetes mellitus without complication, without long-term current use of insulin ([address]     2. Hyperlipidemia LDL goal <100     3. Iron deficiency anemia, unspecified iron deficiency anemia type     Stable. Continue current Tx plan. Any changes marked below. MA: Please recheck blood pressure. Let me know if > 140/90 BEFORE they leave. Thanks! INSTRUCTIONS  NEXT APPOINTMENT: Please schedule annual complete physical (30 minutes) in 3 months. · PLEASE TAKE THIS FORM TO CHECK-OUT WINDOW TO SCHEDULE NEXT VISIT. PLEASE GET FASTING BLOODWORK DRAWN SOON. Lab is on first floor in [address].  Hours Monday to Friday 7 AM to 5 PM. No

## 2020-12-24 ENCOUNTER — APPOINTMENT (OUTPATIENT)
Dept: HEMATOLOGY ONCOLOGY | Facility: CLINIC | Age: 60
End: 2020-12-24
Payer: MEDICAID

## 2020-12-24 ENCOUNTER — RESULT REVIEW (OUTPATIENT)
Age: 60
End: 2020-12-24

## 2020-12-24 VITALS
TEMPERATURE: 98.4 F | HEIGHT: 66.97 IN | SYSTOLIC BLOOD PRESSURE: 145 MMHG | DIASTOLIC BLOOD PRESSURE: 90 MMHG | BODY MASS INDEX: 23.09 KG/M2 | OXYGEN SATURATION: 100 % | WEIGHT: 147.1 LBS | HEART RATE: 95 BPM | RESPIRATION RATE: 18 BRPM

## 2020-12-24 PROCEDURE — 99072 ADDL SUPL MATRL&STAF TM PHE: CPT

## 2020-12-24 PROCEDURE — 99215 OFFICE O/P EST HI 40 MIN: CPT

## 2020-12-24 NOTE — CONSULT LETTER
[Dear  ___] : Dear  [unfilled], [Courtesy Letter:] : I had the pleasure of seeing your patient, [unfilled], in my office today. [Please see my note below.] : Please see my note below. [Consult Closing:] : Thank you very much for allowing me to participate in the care of this patient.  If you have any questions, please do not hesitate to contact me. [Sincerely,] : Sincerely, [FreeTextEntry3] : Aubrey Quinones MD, MPH\par Attending Physician\par Hematology Oncology\par [facility] [place]\par [facility]\par

## 2020-12-24 NOTE — ASSESSMENT
[FreeTextEntry1] : Relapsed IgG Kappa multiple myeloma \par Diagnosed 2018 s/p RVD x 3 cycles.\par Developed severe skin reaction to revlimid and was supposed to be switched ot pomalyst.\par However did not want stem cell transplant and decided to stop follow up\par Presented Feb 2020 with low back pain found to have diffuse bone metastases\par Bone marrow (3/20): > 70% clonal plasma cells\par Hyperdiploidy and gains of chromosomes 3, 5, 9, 11, 15 and 19, as well as, rearrangements of 1p  are recurrently seen in plasma cell disorders.  Complex karyotypic changes are generally associated with an unfavorable clinical course\par FISH myeloma-  Three copies of CCND1 detected (15.5%)\par \par #On daratumumab kyprolis dexamethasone ( 5/5/20-present) \par Here for W1 of Kyprolis briseida dex \par Tolerating well\par Overall has excellent improvement in clinical and lab parameters Although he is not entirely convinced\par Labs reviewed and discussed\par Explained to him about the monitoring parameters and how they look much better\par Acyclovir 400mg bid \par He is now on daratumumab D3lbccq and kyprolis 3 weeks on 1 week off\par \par Low back pain\par He has gained 30 lbs since original presentation\par Obtain MRI lumbar spine\par \par #Lower and upper back pain - 2/2 Bone metastases\par pet/ct scan denied. bone scan as stated above\par Completed Radiation C6-T3 spine, completed 5/20/20\par continue with Oxycodone 5mg prn.\par Xgeva given today 11/19/20\par Stressed to take Ca 600mg bid.\par \par #polyuria - no other urology symptoms - will monitor. \par \par Follow up in 1 weeks for W2 of kyprolis dex \par CBC, CMP, myeloma panel\par \par Contact\par Nadia Robert (sister) - [phone]\par \par

## 2020-12-24 NOTE — HISTORY OF PRESENT ILLNESS
[de-identified] : Mr. Rolf Robert is 60 year old male with IgG Kappa multiple myeloma transferred care from Dr. Amy Vo. \par \par Patient initially diagnosed with Multiple myeloma in 2018 and received 3 cycles of RVD but developed a severe skin reaction and was switched to Pomalyst/Pomalidomide.   \par After 2 months he was recommended stem cell transplant-> he was completely against transplant and was lost to follow up\par \par In Feb 2020, he developed low back pain radiating down to both hips. CT Pelvis done demonstrating severe joint space narrowing involving the right hip, moderate osteophytic changes on the left. Abnormal appearance of osseous structures of the pelvis with multiple lytic foci throughout the pelvis with a permeative appearance of the cortex of bilateral iliac bones. Mild compression deformity at L4 age indeterminant noted. \par B/l hips pain persisted with 3/2020 X-ray of pelvis noted multifocal lytic lesions throughout the bone.  PT was then by neurosurgery on 3/30/20 who recommended rad onc with MRI on 3/31/20 noted moderate compression fracture deformity involving C7 vertebral body with retropulsion of bone and mild bony central canal stenosis without cord compression or epidural extension. A completely collapsed T2 vertebral body with mild retropulsion of bone into spinal canal with moderate central canal stenosis with indentation of the ventral cord. No significant epidural tumor extension present and no edema noted within the adjacent spinal cord. Moderate pathologic compression fracture of T6 without retropulsion of bone/cord compression. Mild compression fracture of T7 without retropulsion of bone. Mild of T8 with mild retropulsion of bone and mild central canal stenosis. Moderate pathologic compression fracture of T10 without retropulsion/compression. Minimal pathologic compression of inferior endplate of T11. Mild pathologic compression involving T12 and L3. Moderate pathologic compression fracture involving L4 stable compared to prior CT. Mild retropulsion of bone present.\par \par Patient had bone marrow biopsy performed among other MM tests which demonstrated plasma cell neoplasm > 70%. \par Hyperdiploidy and gains of chromosomes 3, 5, 9, 11, 15 and 19, as well as, rearrangements of 1p  are recurrently seen in plasma cell disorders.  Complex karyotypic changes are generally associated with an unfavorable clinical course\par FISH myeloma-  Three copies of CCND1 detected (15.5%)\par \par 4/2/20 skeletal survey demonstrated scattered lytic lesions in the calvarium, spine, and pelvis without fracture or dislocation. Multilevel spondylosis and vertebral wedge compression deformities noted.\par \par Patient was then seen by radiation oncologist Dr. Rica Villalpando on 4/24/20.\par \par  [de-identified] : He is seen today for follow up  and Day 1 Daratumumab kyprolis dex\par Current treatment:  daratumumab kyprolis dexamethasone - (5/5/20-present) \par Now on Yuko  # 20 , W1  of  kyprolis  (3 weeks on and one 1 week off)\par \par He co low back pain\par Usually 2-3 /10 but 3 times a week it gets worse\par He is able to walk without support\par \par Weight = 120->121-> 124 lbs -> 128 -> 126 lbs -->124 -> 127  ->134 lbs -> 131 lbs ->136 lbs -> 146 lbs ->147lbs -> 146 ->147lbs ->150lbs ->148lbs ->149lbs -> 144 lbs -> 147\par \par

## 2020-12-31 ENCOUNTER — APPOINTMENT (OUTPATIENT)
Dept: HEMATOLOGY ONCOLOGY | Facility: CLINIC | Age: 60
End: 2020-12-31
Payer: MEDICAID

## 2020-12-31 ENCOUNTER — RESULT REVIEW (OUTPATIENT)
Age: 60
End: 2020-12-31

## 2020-12-31 VITALS
HEIGHT: 66.97 IN | TEMPERATURE: 97.6 F | WEIGHT: 148.1 LBS | BODY MASS INDEX: 23.25 KG/M2 | SYSTOLIC BLOOD PRESSURE: 164 MMHG | RESPIRATION RATE: 18 BRPM | HEART RATE: 90 BPM | DIASTOLIC BLOOD PRESSURE: 97 MMHG | OXYGEN SATURATION: 98 %

## 2020-12-31 PROCEDURE — 99072 ADDL SUPL MATRL&STAF TM PHE: CPT

## 2020-12-31 PROCEDURE — 99215 OFFICE O/P EST HI 40 MIN: CPT

## 2020-12-31 NOTE — HISTORY OF PRESENT ILLNESS
[de-identified] : Mr. Rolf Robert is 60 year old male with IgG Kappa multiple myeloma transferred care from Dr. Amy Vo. \par \par Patient initially diagnosed with Multiple myeloma in 2018 and received 3 cycles of RVD but developed a severe skin reaction and was switched to Pomalyst/Pomalidomide.   \par After 2 months he was recommended stem cell transplant-> he was completely against transplant and was lost to follow up\par \par In Feb 2020, he developed low back pain radiating down to both hips. CT Pelvis done demonstrating severe joint space narrowing involving the right hip, moderate osteophytic changes on the left. Abnormal appearance of osseous structures of the pelvis with multiple lytic foci throughout the pelvis with a permeative appearance of the cortex of bilateral iliac bones. Mild compression deformity at L4 age indeterminant noted. \par B/l hips pain persisted with 3/2020 X-ray of pelvis noted multifocal lytic lesions throughout the bone.  PT was then by neurosurgery on 3/30/20 who recommended rad onc with MRI on 3/31/20 noted moderate compression fracture deformity involving C7 vertebral body with retropulsion of bone and mild bony central canal stenosis without cord compression or epidural extension. A completely collapsed T2 vertebral body with mild retropulsion of bone into spinal canal with moderate central canal stenosis with indentation of the ventral cord. No significant epidural tumor extension present and no edema noted within the adjacent spinal cord. Moderate pathologic compression fracture of T6 without retropulsion of bone/cord compression. Mild compression fracture of T7 without retropulsion of bone. Mild of T8 with mild retropulsion of bone and mild central canal stenosis. Moderate pathologic compression fracture of T10 without retropulsion/compression. Minimal pathologic compression of inferior endplate of T11. Mild pathologic compression involving T12 and L3. Moderate pathologic compression fracture involving L4 stable compared to prior CT. Mild retropulsion of bone present.\par \par Patient had bone marrow biopsy performed among other MM tests which demonstrated plasma cell neoplasm > 70%. \par Hyperdiploidy and gains of chromosomes 3, 5, 9, 11, 15 and 19, as well as, rearrangements of 1p  are recurrently seen in plasma cell disorders.  Complex karyotypic changes are generally associated with an unfavorable clinical course\par FISH myeloma-  Three copies of CCND1 detected (15.5%)\par \par 4/2/20 skeletal survey demonstrated scattered lytic lesions in the calvarium, spine, and pelvis without fracture or dislocation. Multilevel spondylosis and vertebral wedge compression deformities noted.\par \par Patient was then seen by radiation oncologist Dr. Rica Villalpando on 4/24/20.\par \par  [de-identified] : He is seen today for follow up  and Day 1 Daratumumab kyprolis dex\par Current treatment:  daratumumab kyprolis dexamethasone - (5/5/20-present) \par Now on Yuko  # 20 , W2  of  kyprolis  (3 weeks on and one 1 week off)\par \par Patient reports of having some minimal headaches and has not taking his Amlodipine due to not able to pay for the copay.  Patient still has low back pain but currently not requiring any pain medication. Patient is scheduled for MRI on 1/6/2021.  Patient has a lot of tooth pain and aches, has yet seen the dentist and wants to hold off Xgeva. \par \par Weight = 120->121-> 124 lbs -> 128 -> 126 lbs -->124 -> 127  ->134 lbs -> 131 lbs ->136 lbs -> 146 lbs ->147lbs -> 146 ->147lbs ->150lbs ->148lbs ->149lbs -> 144 lbs -> 147\par \par

## 2020-12-31 NOTE — CONSULT LETTER
[Dear  ___] : Dear  [unfilled], [Courtesy Letter:] : I had the pleasure of seeing your patient, [unfilled], in my office today. [Please see my note below.] : Please see my note below. [Consult Closing:] : Thank you very much for allowing me to participate in the care of this patient.  If you have any questions, please do not hesitate to contact me. [Sincerely,] : Sincerely, [FreeTextEntry3] : Aubrey Quinones MD, MPH\par Attending Physician\par Hematology Oncology\par [facility] [place]\par [facility]\par

## 2020-12-31 NOTE — ASSESSMENT
[FreeTextEntry1] : Relapsed IgG Kappa multiple myeloma \par Diagnosed 2018 s/p RVD x 3 cycles.\par Developed severe skin reaction to revlimid and was supposed to be switched ot pomalyst.\par However did not want stem cell transplant and decided to stop follow up\par Presented Feb 2020 with low back pain found to have diffuse bone metastases\par Bone marrow (3/20): > 70% clonal plasma cells\par Hyperdiploidy and gains of chromosomes 3, 5, 9, 11, 15 and 19, as well as, rearrangements of 1p  are recurrently seen in plasma cell disorders.  Complex karyotypic changes are generally associated with an unfavorable clinical course\par FISH myeloma-  Three copies of CCND1 detected (15.5%)\par \par #On daratumumab kyprolis dexamethasone ( 5/5/20-present) \par Here for W2 of Kyprolis briseida dex \par Tolerating well and clinically doing better but still with chronic low back. \par Overall has excellent improvement in clinical and lab parameters Although he is not entirely convinced\par Labs reviewed and discussed extensively\par Acyclovir 400mg bid \par He is now on daratumumab P2rrdzo and kyprolis 3 weeks on 1 week off\par \par Low back pain\par He has gained 30 lbs since original presentation\par Obtain MRI lumbar spine\par \par #Lower and upper back pain - 2/2 Bone metastases\par pet/ct scan denied. bone scan as stated above\par Completed Radiation C6-T3 spine, completed 5/20/20\par continue with Oxycodone 5mg prn.\par Xgeva given today 11/19/20 - now with teeth pain - advised to follow up with dentist - holding off Xgeva for now.\par Stressed to take Ca 600mg bid.\par \par #HTN - intermittent headaches - /97 - Amlodipine 10mg given with infusion. Advised to pic up medications. \par #polyuria - no other urology symptoms - will monitor. \par \par d/w Dr. Quionnes\par Follow up in 1 weeks for W3 of kyprolis dex \par CBC, CMP, myeloma panel\par \par Contact\par Nadia Robert (sister) - [phone]\par \par

## 2021-01-06 ENCOUNTER — RESULT REVIEW (OUTPATIENT)
Age: 61
End: 2021-01-06

## 2021-01-07 ENCOUNTER — RESULT REVIEW (OUTPATIENT)
Age: 61
End: 2021-01-07

## 2021-01-07 ENCOUNTER — APPOINTMENT (OUTPATIENT)
Dept: HEMATOLOGY ONCOLOGY | Facility: CLINIC | Age: 61
End: 2021-01-07
Payer: MEDICAID

## 2021-01-07 VITALS
TEMPERATURE: 97.6 F | HEART RATE: 96 BPM | SYSTOLIC BLOOD PRESSURE: 153 MMHG | RESPIRATION RATE: 18 BRPM | WEIGHT: 150.2 LBS | OXYGEN SATURATION: 99 % | HEIGHT: 66.97 IN | DIASTOLIC BLOOD PRESSURE: 103 MMHG | BODY MASS INDEX: 23.57 KG/M2

## 2021-01-07 PROCEDURE — 99215 OFFICE O/P EST HI 40 MIN: CPT

## 2021-01-07 PROCEDURE — 99072 ADDL SUPL MATRL&STAF TM PHE: CPT

## 2021-01-07 NOTE — CONSULT LETTER
[Dear  ___] : Dear  [unfilled], [Courtesy Letter:] : I had the pleasure of seeing your patient, [unfilled], in my office today. [Please see my note below.] : Please see my note below. [Consult Closing:] : Thank you very much for allowing me to participate in the care of this patient.  If you have any questions, please do not hesitate to contact me. [Sincerely,] : Sincerely, [FreeTextEntry3] : Aubrey Quinones MD, MPH\par Attending Physician\par Hematology Oncology\par [facility] [place]\par [facility]\par

## 2021-01-07 NOTE — ASSESSMENT
[FreeTextEntry1] : Relapsed IgG Kappa multiple myeloma \par Diagnosed 2018 s/p RVD x 3 cycles.\par Developed severe skin reaction to revlimid and was supposed to be switched ot pomalyst.\par However did not want stem cell transplant and decided to stop follow up\par Presented Feb 2020 with low back pain found to have diffuse bone metastases\par Bone marrow (3/20): > 70% clonal plasma cells\par Hyperdiploidy and gains of chromosomes 3, 5, 9, 11, 15 and 19, as well as, rearrangements of 1p  are recurrently seen in plasma cell disorders.  Complex karyotypic changes are generally associated with an unfavorable clinical course\par FISH myeloma-  Three copies of CCND1 detected (15.5%)\par \par #On daratuumab kyprolis dexamethasone ( 5/5/20-present) \par Here for W3 of Kyprolis yuko dex \par Next week is his week off\par Tolerating well\par Overall has excellent improvement in clinical and lab parameters Although he is not entirely convinced\par Labs reviewed and discussed\par Acyclovir 400mg bid \par He is now on daratumumab O0xmsel and kyprolis 3 weeks on 1 week off\par \par Low back pain\par He has gained 30 lbs since original presentation\par MRI lumbar spine reviewed\par Refer to IR for L4 kyphoplasty\par \par Bone metastases\par pet/ct scan denied. bone scan as stated above\par Completed Radiation C6-T3 spine, completed 5/20/20\par continue with Oxycodone 5mg prn.\par Stressed to take Ca 600mg bid.\par Declined Xgeva as it is apparently giving him tooth ache\par \par Follow up in 2 weeks for W1 of Yuko kyprolis dex \par CBC, CMP, myeloma panel\par \par Contact\par Nadia Robert (sister) - [phone]\par \par

## 2021-01-07 NOTE — HISTORY OF PRESENT ILLNESS
[de-identified] : Mr. Rolf Robert is 60 year old male with IgG Kappa multiple myeloma transferred care from Dr. Amy Vo. \par \par Patient initially diagnosed with Multiple myeloma in 2018 and received 3 cycles of RVD but developed a severe skin reaction and was switched to Pomalyst/Pomalidomide.   \par After 2 months he was recommended stem cell transplant-> he was completely against transplant and was lost to follow up\par \par In Feb 2020, he developed low back pain radiating down to both hips. CT Pelvis done demonstrating severe joint space narrowing involving the right hip, moderate osteophytic changes on the left. Abnormal appearance of osseous structures of the pelvis with multiple lytic foci throughout the pelvis with a permeative appearance of the cortex of bilateral iliac bones. Mild compression deformity at L4 age indeterminant noted. \par B/l hips pain persisted with 3/2020 X-ray of pelvis noted multifocal lytic lesions throughout the bone.  PT was then by neurosurgery on 3/30/20 who recommended rad onc with MRI on 3/31/20 noted moderate compression fracture deformity involving C7 vertebral body with retropulsion of bone and mild bony central canal stenosis without cord compression or epidural extension. A completely collapsed T2 vertebral body with mild retropulsion of bone into spinal canal with moderate central canal stenosis with indentation of the ventral cord. No significant epidural tumor extension present and no edema noted within the adjacent spinal cord. Moderate pathologic compression fracture of T6 without retropulsion of bone/cord compression. Mild compression fracture of T7 without retropulsion of bone. Mild of T8 with mild retropulsion of bone and mild central canal stenosis. Moderate pathologic compression fracture of T10 without retropulsion/compression. Minimal pathologic compression of inferior endplate of T11. Mild pathologic compression involving T12 and L3. Moderate pathologic compression fracture involving L4 stable compared to prior CT. Mild retropulsion of bone present.\par \par Patient had bone marrow biopsy performed among other MM tests which demonstrated plasma cell neoplasm > 70%. \par Hyperdiploidy and gains of chromosomes 3, 5, 9, 11, 15 and 19, as well as, rearrangements of 1p  are recurrently seen in plasma cell disorders.  Complex karyotypic changes are generally associated with an unfavorable clinical course\par FISH myeloma-  Three copies of CCND1 detected (15.5%)\par \par 4/2/20 skeletal survey demonstrated scattered lytic lesions in the calvarium, spine, and pelvis without fracture or dislocation. Multilevel spondylosis and vertebral wedge compression deformities noted.\par \par Patient was then seen by radiation oncologist Dr. Rica Villalpando on 4/24/20.\par \par  [de-identified] : He is seen today for follow up  and Day 15 Daratumumab kyprolis dex\par Current treatment:  daratumumab kyprolis dexamethasone - (5/5/20-present) \par Now on Yuko  # 20 , W1  of  kyprolis  (3 weeks on and one 1 week off)\par \par He co low back pain\par Had MRI L spine\par Improvement in abnormal bone marrow signal consistent with multiple myeloma.\par Multiple pathologic compression fractures. No acute compression fracture. Worsening of L4\par compression fracture since previous MRI 3/31/2020 with increasing retropulsion of bone resulting in\par right lateral recess stenosis and right L4-L5 foramen stenosis. There is a fluid "cleft" in the L4\par vertebral body suggesting underlying avascular necrosis.\par Although the compression fractures are pathologic fractures consistent with underlying myeloma,\par there is no evidence of tumor extending through vertebral body cortex into the epidural space.\par \par \par \par \par Weight = 120->121-> 124 lbs -> 128 -> 126 lbs -->124 -> 127  ->134 lbs -> 131 lbs ->136 lbs -> 146 lbs ->147lbs -> 146 ->147lbs ->150lbs ->148lbs ->149lbs -> 144 lbs -> 147\par \par

## 2021-01-08 NOTE — PROCEDURE NOTE - NSICDXPROCEDURE_GEN_ALL_CORE_FT
PROCEDURES:  Bone marrow biopsy 31-Mar-2020 16:17:29  Kateryna Tobias Principal Discharge DX:	Gastroenteritis

## 2021-01-21 ENCOUNTER — RESULT REVIEW (OUTPATIENT)
Age: 61
End: 2021-01-21

## 2021-01-21 ENCOUNTER — APPOINTMENT (OUTPATIENT)
Dept: HEMATOLOGY ONCOLOGY | Facility: CLINIC | Age: 61
End: 2021-01-21
Payer: MEDICAID

## 2021-01-21 VITALS
TEMPERATURE: 98.1 F | SYSTOLIC BLOOD PRESSURE: 153 MMHG | RESPIRATION RATE: 18 BRPM | DIASTOLIC BLOOD PRESSURE: 105 MMHG | OXYGEN SATURATION: 98 % | HEART RATE: 95 BPM | HEIGHT: 66.93 IN | WEIGHT: 140 LBS | BODY MASS INDEX: 21.97 KG/M2

## 2021-01-21 PROCEDURE — 99214 OFFICE O/P EST MOD 30 MIN: CPT

## 2021-01-21 PROCEDURE — 99072 ADDL SUPL MATRL&STAF TM PHE: CPT

## 2021-01-21 NOTE — ASSESSMENT
[FreeTextEntry1] : Relapsed IgG Kappa multiple myeloma \par Diagnosed 2018 s/p RVD x 3 cycles.\par Developed severe skin reaction to revlimid and was supposed to be switched ot pomalyst.\par However did not want stem cell transplant and decided to stop follow up\par Presented Feb 2020 with low back pain found to have diffuse bone metastases\par Bone marrow (3/20): > 70% clonal plasma cells\par Hyperdiploidy and gains of chromosomes 3, 5, 9, 11, 15 and 19, as well as, rearrangements of 1p  are recurrently seen in plasma cell disorders.  Complex karyotypic changes are generally associated with an unfavorable clinical course\par FISH myeloma-  Three copies of CCND1 detected (15.5%)\par \par #On daratuumab kyprolis dexamethasone ( 5/5/20-present) \par Here for W1 of Kyprolis yuko dex \par Next week is his week off\par Tolerating well\par Labs reviewed and discussed\par Acyclovir 400mg bid \par He is now on daratumumab X1gcyep and kyprolis 3 weeks on 1 week off\par \par Low back pain\par MRI lumbar spine reviewed\par Refer to IR for L4 kyphoplasty\par \par #Weight loss\par -gained about 30 lbs since started treatment but lost 7 lbs in the last two weeks - poor appetite and fatigue\par -advised to increase caloric intake\par -will check weight next week - if weight continues to decrease, will consider appetite stimulant. \par \par #Bone metastases\par pet/ct scan denied. bone scan as stated above\par Completed Radiation C6-T3 spine, completed 5/20/20\par continue with Oxycodone 5mg prn.\par Stressed to take Ca 600mg bid.\par teeth pain -  wants to hold Xgeva until he see dentist.\par \par Follow up in 2 weeks for W2 of Yuko kyprolis dex \par CBC, CMP, myeloma panel\par \par Contact\par Nadia Robert (sister) - [phone]\par \par

## 2021-01-21 NOTE — CONSULT LETTER
[Dear  ___] : Dear  [unfilled], [Courtesy Letter:] : I had the pleasure of seeing your patient, [unfilled], in my office today. [Please see my note below.] : Please see my note below. [Consult Closing:] : Thank you very much for allowing me to participate in the care of this patient.  If you have any questions, please do not hesitate to contact me. [Sincerely,] : Sincerely, [FreeTextEntry3] : Aubrey Quinones MD, MPH\par Attending Physician\par Hematology Oncology\par [facility] [place]\par [facility]\par

## 2021-01-21 NOTE — RESULTS/DATA
[FreeTextEntry1] : Labs reviewed, analyzed and discussed\par 1/21/21 - wbc 6.4 hgb 13.0 plts 527\par \par 1/6/2021 MRI L spine\par Improvement in abnormal bone marrow signal consistent with multiple myeloma.\par Multiple pathologic compression fractures. No acute compression fracture. Worsening of L4\par compression fracture since previous MRI 3/31/2020 with increasing retropulsion of bone resulting in\par right lateral recess stenosis and right L4-L5 foramen stenosis. There is a fluid "cleft" in the L4\par vertebral body suggesting underlying avascular necrosis.\par Although the compression fractures are pathologic fractures consistent with underlying myeloma,\par there is no evidence of tumor extending through vertebral body cortex into the epidural space.\par

## 2021-01-21 NOTE — HISTORY OF PRESENT ILLNESS
[de-identified] : Mr. Rolf Robert is 60 year old male with IgG Kappa multiple myeloma transferred care from Dr. Amy Vo. \par \par Patient initially diagnosed with Multiple myeloma in 2018 and received 3 cycles of RVD but developed a severe skin reaction and was switched to Pomalyst/Pomalidomide.   \par After 2 months he was recommended stem cell transplant-> he was completely against transplant and was lost to follow up\par \par In Feb 2020, he developed low back pain radiating down to both hips. CT Pelvis done demonstrating severe joint space narrowing involving the right hip, moderate osteophytic changes on the left. Abnormal appearance of osseous structures of the pelvis with multiple lytic foci throughout the pelvis with a permeative appearance of the cortex of bilateral iliac bones. Mild compression deformity at L4 age indeterminant noted. \par B/l hips pain persisted with 3/2020 X-ray of pelvis noted multifocal lytic lesions throughout the bone.  PT was then by neurosurgery on 3/30/20 who recommended rad onc with MRI on 3/31/20 noted moderate compression fracture deformity involving C7 vertebral body with retropulsion of bone and mild bony central canal stenosis without cord compression or epidural extension. A completely collapsed T2 vertebral body with mild retropulsion of bone into spinal canal with moderate central canal stenosis with indentation of the ventral cord. No significant epidural tumor extension present and no edema noted within the adjacent spinal cord. Moderate pathologic compression fracture of T6 without retropulsion of bone/cord compression. Mild compression fracture of T7 without retropulsion of bone. Mild of T8 with mild retropulsion of bone and mild central canal stenosis. Moderate pathologic compression fracture of T10 without retropulsion/compression. Minimal pathologic compression of inferior endplate of T11. Mild pathologic compression involving T12 and L3. Moderate pathologic compression fracture involving L4 stable compared to prior CT. Mild retropulsion of bone present.\par \par Patient had bone marrow biopsy performed among other MM tests which demonstrated plasma cell neoplasm > 70%. \par Hyperdiploidy and gains of chromosomes 3, 5, 9, 11, 15 and 19, as well as, rearrangements of 1p  are recurrently seen in plasma cell disorders.  Complex karyotypic changes are generally associated with an unfavorable clinical course\par FISH myeloma-  Three copies of CCND1 detected (15.5%)\par \par 4/2/20 skeletal survey demonstrated scattered lytic lesions in the calvarium, spine, and pelvis without fracture or dislocation. Multilevel spondylosis and vertebral wedge compression deformities noted.\par \par Patient was then seen by radiation oncologist Dr. Rica Villalpando on 4/24/20.\par \par  [de-identified] : He is seen today for Daratumumab # 21 W1 of Kyprolis \par Current treatment:  daratumumab kyprolis dexamethasone - (5/5/20-present) \par \par Patient reports of having fatigue and not having good appetite, actually lost about 10 lbs in the last two weeks. His taste has altered, he's  He is starting to take his Amlodipine regularly. His low back pain stays the same. He denies n/v, fever, headaches, dizziness, chest pain, SOB/HILLS, rash, bleeding. \par Patient still has teeth pain but has yet able to follow up with dentist.\par \par Weight = 120->121-> 124 lbs -> 128 -> 126 lbs -->124 -> 127  ->134 lbs -> 131 lbs ->136 lbs -> 146 lbs ->147lbs -> 146 ->147lbs ->150lbs ->148lbs ->149lbs -> 144 lbs -> 147 -> 140lbs \par \par

## 2021-01-28 ENCOUNTER — APPOINTMENT (OUTPATIENT)
Dept: HEMATOLOGY ONCOLOGY | Facility: CLINIC | Age: 61
End: 2021-01-28
Payer: MEDICAID

## 2021-01-28 ENCOUNTER — RESULT REVIEW (OUTPATIENT)
Age: 61
End: 2021-01-28

## 2021-01-28 VITALS
RESPIRATION RATE: 18 BRPM | DIASTOLIC BLOOD PRESSURE: 90 MMHG | TEMPERATURE: 99.2 F | BODY MASS INDEX: 23.07 KG/M2 | HEIGHT: 66.93 IN | HEART RATE: 100 BPM | OXYGEN SATURATION: 98 % | SYSTOLIC BLOOD PRESSURE: 145 MMHG | WEIGHT: 147 LBS

## 2021-01-28 PROCEDURE — 99214 OFFICE O/P EST MOD 30 MIN: CPT

## 2021-01-28 PROCEDURE — 99072 ADDL SUPL MATRL&STAF TM PHE: CPT

## 2021-01-28 NOTE — RESULTS/DATA
[FreeTextEntry1] : Labs reviewed, analyzed and discussed\par 1/28/21 wbc 7.0 hgb 12.6 hct 39.3 plts 384\par \par 1/6/2021 MRI L spine\par Improvement in abnormal bone marrow signal consistent with multiple myeloma.\par Multiple pathologic compression fractures. No acute compression fracture. Worsening of L4\par compression fracture since previous MRI 3/31/2020 with increasing retropulsion of bone resulting in\par right lateral recess stenosis and right L4-L5 foramen stenosis. There is a fluid "cleft" in the L4\par vertebral body suggesting underlying avascular necrosis.\par Although the compression fractures are pathologic fractures consistent with underlying myeloma,\par there is no evidence of tumor extending through vertebral body cortex into the epidural space.\par

## 2021-01-28 NOTE — HISTORY OF PRESENT ILLNESS
[de-identified] : Mr. Rolf Robert is 60 year old male with IgG Kappa multiple myeloma transferred care from Dr. Amy Vo. \par \par Patient initially diagnosed with Multiple myeloma in 2018 and received 3 cycles of RVD but developed a severe skin reaction and was switched to Pomalyst/Pomalidomide.   \par After 2 months he was recommended stem cell transplant-> he was completely against transplant and was lost to follow up\par \par In Feb 2020, he developed low back pain radiating down to both hips. CT Pelvis done demonstrating severe joint space narrowing involving the right hip, moderate osteophytic changes on the left. Abnormal appearance of osseous structures of the pelvis with multiple lytic foci throughout the pelvis with a permeative appearance of the cortex of bilateral iliac bones. Mild compression deformity at L4 age indeterminant noted. \par B/l hips pain persisted with 3/2020 X-ray of pelvis noted multifocal lytic lesions throughout the bone.  PT was then by neurosurgery on 3/30/20 who recommended rad onc with MRI on 3/31/20 noted moderate compression fracture deformity involving C7 vertebral body with retropulsion of bone and mild bony central canal stenosis without cord compression or epidural extension. A completely collapsed T2 vertebral body with mild retropulsion of bone into spinal canal with moderate central canal stenosis with indentation of the ventral cord. No significant epidural tumor extension present and no edema noted within the adjacent spinal cord. Moderate pathologic compression fracture of T6 without retropulsion of bone/cord compression. Mild compression fracture of T7 without retropulsion of bone. Mild of T8 with mild retropulsion of bone and mild central canal stenosis. Moderate pathologic compression fracture of T10 without retropulsion/compression. Minimal pathologic compression of inferior endplate of T11. Mild pathologic compression involving T12 and L3. Moderate pathologic compression fracture involving L4 stable compared to prior CT. Mild retropulsion of bone present.\par \par Patient had bone marrow biopsy performed among other MM tests which demonstrated plasma cell neoplasm > 70%. \par Hyperdiploidy and gains of chromosomes 3, 5, 9, 11, 15 and 19, as well as, rearrangements of 1p  are recurrently seen in plasma cell disorders.  Complex karyotypic changes are generally associated with an unfavorable clinical course\par FISH myeloma-  Three copies of CCND1 detected (15.5%)\par \par 4/2/20 skeletal survey demonstrated scattered lytic lesions in the calvarium, spine, and pelvis without fracture or dislocation. Multilevel spondylosis and vertebral wedge compression deformities noted.\par \par Patient was then seen by radiation oncologist Dr. Rica Villalpando on 4/24/20.\par \par  [de-identified] : He is seen today for Daratumumab # 21 W2 of Kyprolis \par Current treatment:  daratumumab kyprolis dexamethasone - (5/5/20-present) \par \par Patient reports of having more fatigue and some nausea (no vomiting) with chemo treatment, doing better the week that he's off treatment. His appetite also diminished but not losing weight. Patient still has low back pain and was recommended to see IR of kyphoplasty on L4 fracture but he declines. He's still able to walk comfortable with support and has no numbness on his legs. \par \par He denies n/v, fever, headaches, dizziness, chest pain, SOB/HILLS, rash, bleeding. \par \par Weight = 120->121-> 124 lbs -> 128 -> 126 lbs -->124 -> 127  ->134 lbs -> 131 lbs ->136 lbs -> 146 lbs ->147lbs -> 146 ->147lbs ->150lbs ->148lbs ->149lbs -> 144 lbs -> 147 -> 140lbs -> 147\par \par

## 2021-01-28 NOTE — ASSESSMENT
[FreeTextEntry1] : Relapsed IgG Kappa multiple myeloma \par Diagnosed 2018 s/p RVD x 3 cycles.\par Developed severe skin reaction to revlimid and was supposed to be switched ot pomalyst.\par However did not want stem cell transplant and decided to stop follow up\par Presented Feb 2020 with low back pain found to have diffuse bone metastases\par Bone marrow (3/20): > 70% clonal plasma cells\par Hyperdiploidy and gains of chromosomes 3, 5, 9, 11, 15 and 19, as well as, rearrangements of 1p  are recurrently seen in plasma cell disorders.  Complex karyotypic changes are generally associated with an unfavorable clinical course\par FISH myeloma-  Three copies of CCND1 detected (15.5%)\par \par #On daratuumab kyprolis dexamethasone ( 5/5/20-present) \par Here for W2 of Kyprolis briseida dex \par Next week is his week off\par Tolerating well\par Labs reviewed and discussed\par Acyclovir 400mg bid \par He is now on daratumumab [id number] and kyprolis 3 weeks on 1 week off\par \par Low back pain\par Refer to IR for L4 kyphoplasty but patient wants to hold off. \par \par #Weight loss\par -gained about 30 lbs since started treatment - weigh tis holding. \par -advised to increase caloric intake\par \par #Bone metastases - \par pet/ct scan denied. bone scan as stated above\par Completed Radiation C6-T3 spine, completed 5/20/20\par continue with Oxycodone 5mg prn.\par Stressed to take Ca 600mg bid.\par given 1/21/21 \par \par Follow up in 1 week for W3 of  kyprolis dex \par CBC, CMP, myeloma panel\par \par Contact\par Nadia Robert (sister) - [phone]\par \par

## 2021-01-28 NOTE — CONSULT LETTER
[Dear  ___] : Dear  [unfilled], [Courtesy Letter:] : I had the pleasure of seeing your patient, [unfilled], in my office today. [Please see my note below.] : Please see my note below. [Consult Closing:] : Thank you very much for allowing me to participate in the care of this patient.  If you have any questions, please do not hesitate to contact me. [Sincerely,] : Sincerely, [FreeTextEntry3] : Aubrey Quinones MD, MPH\par Attending Physician\par Hematology Oncology\par [facility] [place]\par [facility]\par

## 2021-02-04 ENCOUNTER — NON-APPOINTMENT (OUTPATIENT)
Age: 61
End: 2021-02-04

## 2021-02-04 ENCOUNTER — RESULT REVIEW (OUTPATIENT)
Age: 61
End: 2021-02-04

## 2021-02-04 ENCOUNTER — APPOINTMENT (OUTPATIENT)
Dept: HEMATOLOGY ONCOLOGY | Facility: CLINIC | Age: 61
End: 2021-02-04
Payer: MEDICAID

## 2021-02-04 VITALS
SYSTOLIC BLOOD PRESSURE: 137 MMHG | DIASTOLIC BLOOD PRESSURE: 87 MMHG | TEMPERATURE: 99.7 F | WEIGHT: 145 LBS | OXYGEN SATURATION: 99 % | RESPIRATION RATE: 18 BRPM | HEART RATE: 98 BPM | BODY MASS INDEX: 22.76 KG/M2 | HEIGHT: 66.93 IN

## 2021-02-04 PROCEDURE — 99215 OFFICE O/P EST HI 40 MIN: CPT

## 2021-02-04 PROCEDURE — 99072 ADDL SUPL MATRL&STAF TM PHE: CPT

## 2021-02-04 NOTE — CONSULT LETTER
[Dear  ___] : Dear  [unfilled], [Courtesy Letter:] : I had the pleasure of seeing your patient, [unfilled], in my office today. [Please see my note below.] : Please see my note below. [Consult Closing:] : Thank you very much for allowing me to participate in the care of this patient.  If you have any questions, please do not hesitate to contact me. [Sincerely,] : Sincerely, [FreeTextEntry3] : Aubrey Quinones MD, MPH\par Attending Physician\par Hematology Oncology\par [facility] [place]\par [facility]\par

## 2021-02-04 NOTE — HISTORY OF PRESENT ILLNESS
[de-identified] : Mr. Rolf Robert is 60 year old male with IgG Kappa multiple myeloma transferred care from Dr. Amy Vo. \par \par Patient initially diagnosed with Multiple myeloma in 2018 and received 3 cycles of RVD but developed a severe skin reaction and was switched to Pomalyst/Pomalidomide.   \par After 2 months he was recommended stem cell transplant-> he was completely against transplant and was lost to follow up\par \par In Feb 2020, he developed low back pain radiating down to both hips. CT Pelvis done demonstrating severe joint space narrowing involving the right hip, moderate osteophytic changes on the left. Abnormal appearance of osseous structures of the pelvis with multiple lytic foci throughout the pelvis with a permeative appearance of the cortex of bilateral iliac bones. Mild compression deformity at L4 age indeterminant noted. \par B/l hips pain persisted with 3/2020 X-ray of pelvis noted multifocal lytic lesions throughout the bone.  PT was then by neurosurgery on 3/30/20 who recommended rad onc with MRI on 3/31/20 noted moderate compression fracture deformity involving C7 vertebral body with retropulsion of bone and mild bony central canal stenosis without cord compression or epidural extension. A completely collapsed T2 vertebral body with mild retropulsion of bone into spinal canal with moderate central canal stenosis with indentation of the ventral cord. No significant epidural tumor extension present and no edema noted within the adjacent spinal cord. Moderate pathologic compression fracture of T6 without retropulsion of bone/cord compression. Mild compression fracture of T7 without retropulsion of bone. Mild of T8 with mild retropulsion of bone and mild central canal stenosis. Moderate pathologic compression fracture of T10 without retropulsion/compression. Minimal pathologic compression of inferior endplate of T11. Mild pathologic compression involving T12 and L3. Moderate pathologic compression fracture involving L4 stable compared to prior CT. Mild retropulsion of bone present.\par \par Patient had bone marrow biopsy performed among other MM tests which demonstrated plasma cell neoplasm > 70%. \par Hyperdiploidy and gains of chromosomes 3, 5, 9, 11, 15 and 19, as well as, rearrangements of 1p  are recurrently seen in plasma cell disorders.  Complex karyotypic changes are generally associated with an unfavorable clinical course\par FISH myeloma-  Three copies of CCND1 detected (15.5%)\par \par 4/2/20 skeletal survey demonstrated scattered lytic lesions in the calvarium, spine, and pelvis without fracture or dislocation. Multilevel spondylosis and vertebral wedge compression deformities noted.\par \par Patient was then seen by radiation oncologist Dr. Rica Villalpando on 4/24/20.\par \par  [de-identified] : He is seen today for Daratumumab # 21 W3 of Kyprolis \par Current treatment:  daratumumab kyprolis dexamethasone - (5/5/20-present) \par \par Patient has same chronic complaints with some fatigue, minimal back pain, intermittent poor appetite without weight loss, and some nausea post treatment without vomiting. He also reports of being depressed and obsessively concerns about his disease and treatment despite that he's doing well and responding to treatment.\par \par He denies n/v, fever, headaches, dizziness, chest pain, SOB/HILLS, rash, bleeding. \par \par Weight = 120->121-> 124 lbs -> 128 -> 126 lbs -->124 -> 127  ->134 lbs -> 131 lbs ->136 lbs -> 146 lbs ->147lbs -> 146 ->147lbs ->150lbs ->148lbs ->149lbs -> 144 lbs -> 147 -> 140lbs -> 147 -> 145 lbs \par \par

## 2021-02-04 NOTE — ASSESSMENT
[FreeTextEntry1] : Relapsed IgG Kappa multiple myeloma \par Diagnosed 2018 s/p RVD x 3 cycles.\par Developed severe skin reaction to revlimid and was supposed to be switched ot pomalyst.\par However did not want stem cell transplant and decided to stop follow up\par Presented Feb 2020 with low back pain found to have diffuse bone metastases\par Bone marrow (3/20): > 70% clonal plasma cells\par Hyperdiploidy and gains of chromosomes 3, 5, 9, 11, 15 and 19, as well as, rearrangements of 1p  are recurrently seen in plasma cell disorders.  Complex karyotypic changes are generally associated with an unfavorable clinical course\par FISH myeloma-  Three copies of CCND1 detected (15.5%)\par \par #On daratuumab kyprolis dexamethasone ( 5/5/20-present) \par Daratumumab # 21 W3 of Kyprolis \par Tolerating well\par Labs reviewed and discussed\par Acyclovir 400mg bid \par He is now on daratumumab Q2dtnco and kyprolis 3 weeks on 1 week off\par responding really well to treatment with undetected M spike. \par Patient's reassured again that he's responding well to treatment well with labs showing undetectable M spike and trending down paraproteins. \par \par Low back pain\par Refer to IR for L4 kyphoplasty but patient wants to continue to hold off. \par \par #Weight loss\par -gained about 30 lbs since started treatment - weight is holding. \par -advised to increase caloric intake\par \par #Bone metastases - \par pet/ct scan denied. bone scan as stated above\par Completed Radiation C6-T3 spine, completed 5/20/20\par continue with Oxycodone 5mg prn.\par Stressed to take Ca 600mg bid.\par given 1/21/21 \par \par #HTN - on Amlodipine 10 mg - non-compliant and at times, not filled the prescription due to not having money to for the copay. Advised to take medication daily and stressed the importance of following up with his PCP. \par \par Follow up in 2 week for Daratumumab # 21  W1 of Kyprolis \par CBC, CMP, myeloma panel\par \par Contact\par Nadia Robert (sister) - [phone]\par \par

## 2021-02-04 NOTE — RESULTS/DATA
[FreeTextEntry1] : Labs reviewed, analyzed and discussed\par 2/4/21 wbc 7.2 hgb 12.6 hct 39.5 plts 394\par \par 1/6/2021 MRI L spine\par Improvement in abnormal bone marrow signal consistent with multiple myeloma.\par Multiple pathologic compression fractures. No acute compression fracture. Worsening of L4\par compression fracture since previous MRI 3/31/2020 with increasing retropulsion of bone resulting in\par right lateral recess stenosis and right L4-L5 foramen stenosis. There is a fluid "cleft" in the L4\par vertebral body suggesting underlying avascular necrosis.\par Although the compression fractures are pathologic fractures consistent with underlying myeloma,\par there is no evidence of tumor extending through vertebral body cortex into the epidural space.\par

## 2021-02-18 ENCOUNTER — APPOINTMENT (OUTPATIENT)
Dept: HEMATOLOGY ONCOLOGY | Facility: CLINIC | Age: 61
End: 2021-02-18
Payer: MEDICAID

## 2021-02-18 ENCOUNTER — RESULT REVIEW (OUTPATIENT)
Age: 61
End: 2021-02-18

## 2021-02-18 VITALS
TEMPERATURE: 99 F | WEIGHT: 148 LBS | HEIGHT: 66.93 IN | DIASTOLIC BLOOD PRESSURE: 95 MMHG | HEART RATE: 115 BPM | RESPIRATION RATE: 18 BRPM | OXYGEN SATURATION: 98 % | BODY MASS INDEX: 23.23 KG/M2 | SYSTOLIC BLOOD PRESSURE: 168 MMHG

## 2021-02-18 PROCEDURE — 99072 ADDL SUPL MATRL&STAF TM PHE: CPT

## 2021-02-18 PROCEDURE — 99215 OFFICE O/P EST HI 40 MIN: CPT

## 2021-02-18 NOTE — HISTORY OF PRESENT ILLNESS
[de-identified] : Mr. Rolf Robert is 60 year old male with IgG Kappa multiple myeloma transferred care from Dr. Amy Vo. \par \par Patient initially diagnosed with Multiple myeloma in 2018 and received 3 cycles of RVD but developed a severe skin reaction and was switched to Pomalyst/Pomalidomide.   \par After 2 months he was recommended stem cell transplant-> he was completely against transplant and was lost to follow up\par \par In Feb 2020, he developed low back pain radiating down to both hips. CT Pelvis done demonstrating severe joint space narrowing involving the right hip, moderate osteophytic changes on the left. Abnormal appearance of osseous structures of the pelvis with multiple lytic foci throughout the pelvis with a permeative appearance of the cortex of bilateral iliac bones. Mild compression deformity at L4 age indeterminant noted. \par B/l hips pain persisted with 3/2020 X-ray of pelvis noted multifocal lytic lesions throughout the bone.  PT was then by neurosurgery on 3/30/20 who recommended rad onc with MRI on 3/31/20 noted moderate compression fracture deformity involving C7 vertebral body with retropulsion of bone and mild bony central canal stenosis without cord compression or epidural extension. A completely collapsed T2 vertebral body with mild retropulsion of bone into spinal canal with moderate central canal stenosis with indentation of the ventral cord. No significant epidural tumor extension present and no edema noted within the adjacent spinal cord. Moderate pathologic compression fracture of T6 without retropulsion of bone/cord compression. Mild compression fracture of T7 without retropulsion of bone. Mild of T8 with mild retropulsion of bone and mild central canal stenosis. Moderate pathologic compression fracture of T10 without retropulsion/compression. Minimal pathologic compression of inferior endplate of T11. Mild pathologic compression involving T12 and L3. Moderate pathologic compression fracture involving L4 stable compared to prior CT. Mild retropulsion of bone present.\par \par Patient had bone marrow biopsy performed among other MM tests which demonstrated plasma cell neoplasm > 70%. \par Hyperdiploidy and gains of chromosomes 3, 5, 9, 11, 15 and 19, as well as, rearrangements of 1p  are recurrently seen in plasma cell disorders.  Complex karyotypic changes are generally associated with an unfavorable clinical course\par FISH myeloma-  Three copies of CCND1 detected (15.5%)\par \par 4/2/20 skeletal survey demonstrated scattered lytic lesions in the calvarium, spine, and pelvis without fracture or dislocation. Multilevel spondylosis and vertebral wedge compression deformities noted.\par \par Patient was then seen by radiation oncologist Dr. Rica Villalpando on 4/24/20.\par \par  [de-identified] : He is seen today for Daratumumab # 22 W1 of Kyprolis \par Current treatment:  daratumumab kyprolis dexamethasone - (5/5/20-present) \par \par Patient reports of having some minimal swelling of b/l lower ext with extreme dry scaly skins on b/l ankles down to top of feet.  No pruritus, pain, or tenderness. His low back pain comes and goes, but unchanged. He has not been seen by a primary care doctor for years and last seen his cardiologist Dr. Jung was last year. \par \par He denies n/v, fever, headaches, dizziness, chest pain, SOB/HILLS, rash, bleeding. \par \par Weight = 120->121-> 124 lbs -> 128 -> 126 lbs -->124 -> 127  ->134 lbs -> 131 lbs ->136 lbs -> 146 lbs ->147lbs -> 146 ->147lbs ->150lbs ->148lbs ->149lbs -> 144 lbs -> 147 -> 140lbs -> 147 -> 145 lbs -> 148lbs\par \par

## 2021-02-18 NOTE — RESULTS/DATA
[FreeTextEntry1] : Labs reviewed, analyzed and discussed\par 2/18/21 wbc 8.7 hgb 12.9 hct 41.1 plts 521\par \par 1/6/2021 MRI L spine\par Improvement in abnormal bone marrow signal consistent with multiple myeloma.\par Multiple pathologic compression fractures. No acute compression fracture. Worsening of L4\par compression fracture since previous MRI 3/31/2020 with increasing retropulsion of bone resulting in\par right lateral recess stenosis and right L4-L5 foramen stenosis. There is a fluid "cleft" in the L4\par vertebral body suggesting underlying avascular necrosis.\par Although the compression fractures are pathologic fractures consistent with underlying myeloma,\par there is no evidence of tumor extending through vertebral body cortex into the epidural space.\par

## 2021-02-18 NOTE — CONSULT LETTER
[Dear  ___] : Dear  [unfilled], [Courtesy Letter:] : I had the pleasure of seeing your patient, [unfilled], in my office today. [Please see my note below.] : Please see my note below. [Consult Closing:] : Thank you very much for allowing me to participate in the care of this patient.  If you have any questions, please do not hesitate to contact me. [Sincerely,] : Sincerely, [FreeTextEntry3] : Aubrey Quinones MD, MPH\par Attending Physician\par Hematology Oncology\par [facility] [place]\par [facility]\par

## 2021-02-18 NOTE — ASSESSMENT
[FreeTextEntry1] : Relapsed IgG Kappa multiple myeloma \par Diagnosed 2018 s/p RVD x 3 cycles.\par Developed severe skin reaction to revlimid and was supposed to be switched ot pomalyst.\par However did not want stem cell transplant and decided to stop follow up\par Presented Feb 2020 with low back pain found to have diffuse bone metastases\par Bone marrow (3/20): > 70% clonal plasma cells\par Hyperdiploidy and gains of chromosomes 3, 5, 9, 11, 15 and 19, as well as, rearrangements of 1p  are recurrently seen in plasma cell disorders.  Complex karyotypic changes are generally associated with an unfavorable clinical course\par FISH myeloma-  Three copies of CCND1 detected (15.5%)\par \par #On daratuumab kyprolis dexamethasone ( 5/5/20-present) \par Daratumumab # 22 W1 of Kyprolis \par Tolerating well\par Labs reviewed and discussed\par Acyclovir 400mg bid \par He is now on daratumumab [id number] and kyprolis 3 weeks on 1 week off\par responding really well to treatment with undetected M spike. \par Patient's reassured again that he's responding well to treatment well with labs showing undetectable M spike and trending down paraproteins. \par \par #b/l lower ext edema - normal kidney function, LFTs, and is eating well. \par Patient has not had annual physical check up or follow up with cardiologist  - both PCP Dr. NEFF and Dr. Rodriguez's contact given to patient to schedule. \par Leg elevattions advised\par \par #psoriasis on ankles - heavy moisturizers advised. \par \par #Low back pain\par Refer to IR for L4 kyphoplasty but patient wants to continue to hold off. \par \par #Weight loss\par -gained about 30 lbs since started treatment - weight is holding. \par -advised to increase caloric intake\par \par #Bone metastases - \par pet/ct scan denied. bone scan as stated above\par Completed Radiation C6-T3 spine, completed 5/20/20\par continue with Oxycodone 5mg prn.\par Stressed to take Ca 600mg bid.\par given 1/21/21 \par \par #HTN - on Amlodipine 10 mg - advised to take daily and to follow up with Dr. Rodriguez. \par \par Follow up in 1 week for Daratumumab # 21  W2 of Kyprolis \par CBC, CMP, myeloma panel\par \par Contact\par Nadia Robert (sister) - [phone]\par \par

## 2021-02-18 NOTE — PHYSICAL EXAM
[Ambulatory and capable of all self care but unable to carry out any work activities] : Status 2- Ambulatory and capable of all self care but unable to carry out any work activities. Up and about more than 50% of waking hours [Normal] : affect appropriate [de-identified] : +psoriasis on ankles [de-identified] : +trace pitting edema of b/l lower ext with dry skin on ankles

## 2021-02-24 ENCOUNTER — APPOINTMENT (OUTPATIENT)
Dept: GERIATRICS | Facility: CLINIC | Age: 61
End: 2021-02-24

## 2021-02-25 ENCOUNTER — RESULT REVIEW (OUTPATIENT)
Age: 61
End: 2021-02-25

## 2021-02-25 ENCOUNTER — APPOINTMENT (OUTPATIENT)
Dept: HEMATOLOGY ONCOLOGY | Facility: CLINIC | Age: 61
End: 2021-02-25
Payer: MEDICAID

## 2021-02-25 VITALS
RESPIRATION RATE: 16 BRPM | HEIGHT: 66.93 IN | DIASTOLIC BLOOD PRESSURE: 87 MMHG | HEART RATE: 94 BPM | SYSTOLIC BLOOD PRESSURE: 138 MMHG | BODY MASS INDEX: 22.44 KG/M2 | TEMPERATURE: 98.7 F | OXYGEN SATURATION: 99 % | WEIGHT: 143 LBS

## 2021-02-25 PROCEDURE — 99215 OFFICE O/P EST HI 40 MIN: CPT

## 2021-02-25 PROCEDURE — 99072 ADDL SUPL MATRL&STAF TM PHE: CPT

## 2021-02-25 NOTE — HISTORY OF PRESENT ILLNESS
[de-identified] : Mr. Rolf Robert is 60 year old male with IgG Kappa multiple myeloma transferred care from Dr. Amy Vo. \par \par Patient initially diagnosed with Multiple myeloma in 2018 and received 3 cycles of RVD but developed a severe skin reaction and was switched to Pomalyst/Pomalidomide.   \par After 2 months he was recommended stem cell transplant-> he was completely against transplant and was lost to follow up\par \par In Feb 2020, he developed low back pain radiating down to both hips. CT Pelvis done demonstrating severe joint space narrowing involving the right hip, moderate osteophytic changes on the left. Abnormal appearance of osseous structures of the pelvis with multiple lytic foci throughout the pelvis with a permeative appearance of the cortex of bilateral iliac bones. Mild compression deformity at L4 age indeterminant noted. \par B/l hips pain persisted with 3/2020 X-ray of pelvis noted multifocal lytic lesions throughout the bone.  PT was then by neurosurgery on 3/30/20 who recommended rad onc with MRI on 3/31/20 noted moderate compression fracture deformity involving C7 vertebral body with retropulsion of bone and mild bony central canal stenosis without cord compression or epidural extension. A completely collapsed T2 vertebral body with mild retropulsion of bone into spinal canal with moderate central canal stenosis with indentation of the ventral cord. No significant epidural tumor extension present and no edema noted within the adjacent spinal cord. Moderate pathologic compression fracture of T6 without retropulsion of bone/cord compression. Mild compression fracture of T7 without retropulsion of bone. Mild of T8 with mild retropulsion of bone and mild central canal stenosis. Moderate pathologic compression fracture of T10 without retropulsion/compression. Minimal pathologic compression of inferior endplate of T11. Mild pathologic compression involving T12 and L3. Moderate pathologic compression fracture involving L4 stable compared to prior CT. Mild retropulsion of bone present.\par \par Patient had bone marrow biopsy performed among other MM tests which demonstrated plasma cell neoplasm > 70%. \par Hyperdiploidy and gains of chromosomes 3, 5, 9, 11, 15 and 19, as well as, rearrangements of 1p  are recurrently seen in plasma cell disorders.  Complex karyotypic changes are generally associated with an unfavorable clinical course\par FISH myeloma-  Three copies of CCND1 detected (15.5%)\par \par 4/2/20 skeletal survey demonstrated scattered lytic lesions in the calvarium, spine, and pelvis without fracture or dislocation. Multilevel spondylosis and vertebral wedge compression deformities noted.\par \par Patient was then seen by radiation oncologist Dr. Rica Villalpando on 4/24/20.\par \par \par 1/6/2021 MRI L spine\par Improvement in abnormal bone marrow signal consistent with multiple myeloma.\par Multiple pathologic compression fractures. No acute compression fracture. Worsening of L4\par compression fracture since previous MRI 3/31/2020 with increasing retropulsion of bone resulting in\par right lateral recess stenosis and right L4-L5 foramen stenosis. There is a fluid "cleft" in the L4\par vertebral body suggesting underlying avascular necrosis.\par Although the compression fractures are pathologic fractures consistent with underlying myeloma,\par there is no evidence of tumor extending through vertebral body cortex into the epidural space.\par  [de-identified] : He is seen today for Daratumumab # 22 W2 of Kyprolis \par Current treatment:  daratumumab kyprolis dexamethasone - (5/5/20-present) \par \par He has mild pain in the lower back, but better\par Mild edema BL Lower extremities\par \par Weight = 120->121-> 124 lbs -> 128 -> 126 lbs -->124 -> 127  ->134 lbs -> 131 lbs ->136 lbs -> 146 lbs ->147lbs -> 146 ->147lbs ->150lbs ->148lbs ->149lbs -> 144 lbs -> 147 -> 140lbs -> 147 -> 145 lbs -> 148lbs\par \par

## 2021-02-25 NOTE — ASSESSMENT
[FreeTextEntry1] : Relapsed IgG Kappa multiple myeloma \par Diagnosed 2018 s/p RVD x 3 cycles.\par Developed severe skin reaction to revlimid and was supposed to be switched ot pomalyst.\par However did not want stem cell transplant and decided to stop follow up\par Presented Feb 2020 with low back pain found to have diffuse bone metastases\par Bone marrow (3/20): > 70% clonal plasma cells\par Hyperdiploidy and gains of chromosomes 3, 5, 9, 11, 15 and 19, as well as, rearrangements of 1p  are recurrently seen in plasma cell disorders.  Complex karyotypic changes are generally associated with an unfavorable clinical course\par FISH myeloma-  Three copies of CCND1 detected (15.5%)\par \par #On daratuumab kyprolis dexamethasone ( 5/5/20-present) \par Daratumumab # 22 W2 of Kyprolis \par Tolerating well\par Labs reviewed and discussed\par Acyclovir 400mg bid \par He is now on daratumumab [id number] and kyprolis 3 weeks on 1 week off\par Patient's reassured again that he's responding well to treatment well with labs showing undetectable M spike and trending down paraproteins. \par \par b/l lower ext edema \par Likely from kyprolis\par No sob. Lung exam normal\par Compression stockings and leg elevation advised\par \par #Low back pain\par Refer to IR for L4 kyphoplasty but patient wants to continue to hold off. \par \par Bone metastases - \par pet/ct scan denied. bone scan as stated above\par Completed Radiation C6-T3 spine, completed 5/20/20\par continue with Oxycodone 5mg prn.\par Stressed to take Ca 600mg bid.\par given 2/25/21\par \par #HTN - on Amlodipine 10 mg - advised to take daily and to follow up with Dr. Rodriguez. \par \par Follow up in 1 week for Daratumumab # 21 W3 of Kyprolis \par CBC, CMP, \par \par Contact\par Nadia Robert (sister) - [phone]\par \par

## 2021-02-25 NOTE — PHYSICAL EXAM
[Ambulatory and capable of all self care but unable to carry out any work activities] : Status 2- Ambulatory and capable of all self care but unable to carry out any work activities. Up and about more than 50% of waking hours [Normal] : affect appropriate [de-identified] : +trace pitting edema of b/l lower ext with dry skin on ankles [de-identified] : +psoriasis on ankles

## 2021-02-25 NOTE — CONSULT LETTER
[Dear  ___] : Dear  [unfilled], [Courtesy Letter:] : I had the pleasure of seeing your patient, [unfilled], in my office today. [Please see my note below.] : Please see my note below. [Consult Closing:] : Thank you very much for allowing me to participate in the care of this patient.  If you have any questions, please do not hesitate to contact me. [Sincerely,] : Sincerely, [FreeTextEntry3] : Aubrey Quinones MD, MPH\par Attending Physician\par Hematology Oncology\par [facility] [place]\par [facility]\par

## 2021-03-04 ENCOUNTER — APPOINTMENT (OUTPATIENT)
Dept: HEMATOLOGY ONCOLOGY | Facility: CLINIC | Age: 61
End: 2021-03-04
Payer: MEDICAID

## 2021-03-04 ENCOUNTER — RESULT REVIEW (OUTPATIENT)
Age: 61
End: 2021-03-04

## 2021-03-04 VITALS
DIASTOLIC BLOOD PRESSURE: 88 MMHG | OXYGEN SATURATION: 99 % | SYSTOLIC BLOOD PRESSURE: 146 MMHG | TEMPERATURE: 98.5 F | RESPIRATION RATE: 18 BRPM | HEART RATE: 85 BPM | WEIGHT: 144 LBS | BODY MASS INDEX: 22.6 KG/M2 | HEIGHT: 66.93 IN

## 2021-03-04 DIAGNOSIS — R63.4 ABNORMAL WEIGHT LOSS: ICD-10-CM

## 2021-03-04 PROCEDURE — 99072 ADDL SUPL MATRL&STAF TM PHE: CPT

## 2021-03-04 PROCEDURE — 99215 OFFICE O/P EST HI 40 MIN: CPT

## 2021-03-04 RX ORDER — ACYCLOVIR 400 MG/1
400 TABLET ORAL
Refills: 0 | Status: DISCONTINUED | COMMUNITY
End: 2021-03-04

## 2021-03-04 NOTE — CONSULT LETTER
[Dear  ___] : Dear  [unfilled], [Courtesy Letter:] : I had the pleasure of seeing your patient, [unfilled], in my office today. [Please see my note below.] : Please see my note below. [Consult Closing:] : Thank you very much for allowing me to participate in the care of this patient.  If you have any questions, please do not hesitate to contact me. [Sincerely,] : Sincerely, [FreeTextEntry3] : Aubrey Quinones MD, MPH\par Attending Physician\par Hematology Oncology\par [facility] [place]\par [facility]\par

## 2021-03-04 NOTE — HISTORY OF PRESENT ILLNESS
[de-identified] : Mr. Rolf Robert is 60 year old male with IgG Kappa multiple myeloma transferred care from Dr. Amy Vo. \par \par Patient initially diagnosed with Multiple myeloma in 2018 and received 3 cycles of RVD but developed a severe skin reaction and was switched to Pomalyst/Pomalidomide.   \par After 2 months he was recommended stem cell transplant-> he was completely against transplant and was lost to follow up\par \par In Feb 2020, he developed low back pain radiating down to both hips. CT Pelvis done demonstrating severe joint space narrowing involving the right hip, moderate osteophytic changes on the left. Abnormal appearance of osseous structures of the pelvis with multiple lytic foci throughout the pelvis with a permeative appearance of the cortex of bilateral iliac bones. Mild compression deformity at L4 age indeterminant noted. \par B/l hips pain persisted with 3/2020 X-ray of pelvis noted multifocal lytic lesions throughout the bone.  PT was then by neurosurgery on 3/30/20 who recommended rad onc with MRI on 3/31/20 noted moderate compression fracture deformity involving C7 vertebral body with retropulsion of bone and mild bony central canal stenosis without cord compression or epidural extension. A completely collapsed T2 vertebral body with mild retropulsion of bone into spinal canal with moderate central canal stenosis with indentation of the ventral cord. No significant epidural tumor extension present and no edema noted within the adjacent spinal cord. Moderate pathologic compression fracture of T6 without retropulsion of bone/cord compression. Mild compression fracture of T7 without retropulsion of bone. Mild of T8 with mild retropulsion of bone and mild central canal stenosis. Moderate pathologic compression fracture of T10 without retropulsion/compression. Minimal pathologic compression of inferior endplate of T11. Mild pathologic compression involving T12 and L3. Moderate pathologic compression fracture involving L4 stable compared to prior CT. Mild retropulsion of bone present.\par \par Patient had bone marrow biopsy performed among other MM tests which demonstrated plasma cell neoplasm > 70%. \par Hyperdiploidy and gains of chromosomes 3, 5, 9, 11, 15 and 19, as well as, rearrangements of 1p  are recurrently seen in plasma cell disorders.  Complex karyotypic changes are generally associated with an unfavorable clinical course\par FISH myeloma-  Three copies of CCND1 detected (15.5%)\par \par 4/2/20 skeletal survey demonstrated scattered lytic lesions in the calvarium, spine, and pelvis without fracture or dislocation. Multilevel spondylosis and vertebral wedge compression deformities noted.\par \par Patient was then seen by radiation oncologist Dr. Rica Villalpando on 4/24/20.\par \par \par 1/6/2021 MRI L spine\par Improvement in abnormal bone marrow signal consistent with multiple myeloma.\par Multiple pathologic compression fractures. No acute compression fracture. Worsening of L4\par compression fracture since previous MRI 3/31/2020 with increasing retropulsion of bone resulting in\par right lateral recess stenosis and right L4-L5 foramen stenosis. There is a fluid "cleft" in the L4\par vertebral body suggesting underlying avascular necrosis.\par Although the compression fractures are pathologic fractures consistent with underlying myeloma,\par there is no evidence of tumor extending through vertebral body cortex into the epidural space.\par  [de-identified] : He is seen today for Daratumumab # 22 W3 of Kyprolis \par Current treatment:  daratumumab kyprolis dexamethasone - (5/5/20-present) \par \par Patient with chronic low back pain, generalized fatigue (mostly weakness on legs), and intermittent edema f b/l lower legs. \par He eats well with no weight loss\par Patient has some mild nausea after each treatment but he doesn't want to take anti-nausea meds, denies vomiting. \par \par \par Weight = 120->121-> 124 lbs -> 128 -> 126 lbs -->124 -> 127  ->134 lbs -> 131 lbs ->136 lbs -> 146 lbs ->147lbs -> 146 ->147lbs ->150lbs ->148lbs ->149lbs -> 144 lbs -> 147 -> 140lbs -> 147 -> 145 lbs -> 148lbs -> 143 lbs -> 144lbs\par \par

## 2021-03-04 NOTE — ASSESSMENT
[FreeTextEntry1] : Relapsed IgG Kappa multiple myeloma \par Diagnosed 2018 s/p RVD x 3 cycles.\par Developed severe skin reaction to revlimid and was supposed to be switched ot pomalyst.\par However did not want stem cell transplant and decided to stop follow up\par Presented Feb 2020 with low back pain found to have diffuse bone metastases\par Bone marrow (3/20): > 70% clonal plasma cells\par Hyperdiploidy and gains of chromosomes 3, 5, 9, 11, 15 and 19, as well as, rearrangements of 1p  are recurrently seen in plasma cell disorders.  Complex karyotypic changes are generally associated with an unfavorable clinical course\par FISH myeloma-  Three copies of CCND1 detected (15.5%)\par \par #On daratuumab kyprolis dexamethasone ( 5/5/20-present) \par Daratumumab # 22 W2 of Kyprolis \par Tolerating well\par Labs reviewed and discussed\par Acyclovir 400mg bid \par He is now on daratumumab [id number] and kyprolis 3 weeks on 1 week off\par Patient's reassured again that he's responding well to treatment well with labs showing undetectable M spike and trending down paraproteins \par His main worries are the bone lesions - advised him to have another restaging bone scan (last scan was Sept 2020) which he wants to think over.  \par \par b/l lower ext edema \par Likely from kyprolis\par No sob. Lung exam normal\par Compression stockings and leg elevation advised\par \par #Low back pain\par Refer to IR for L4 kyphoplasty but patient continues  to hold off for now.\par \par Bone metastases - \par pet/ct scan denied. bone scan as stated above - to have new bone scan which patient wants to reconsider\par Completed Radiation C6-T3 spine, completed 5/20/20\par continue with Oxycodone 5mg prn.\par Stressed to take Ca 600mg bid.\par given 2/25/21\par \par #HTN - on Amlodipine 10 mg - advised to take daily and to follow up with Dr. Rodriguez. \par \par d/w Dr. Quinones \par Follow up in 1 week for Daratumumab # 22 W1 of Kyprolis \par CBC, CMP, \par \par Contact\par Nadia Robert ([facility]) - [phone]\par \par

## 2021-03-04 NOTE — PHYSICAL EXAM
[Ambulatory and capable of all self care but unable to carry out any work activities] : Status 2- Ambulatory and capable of all self care but unable to carry out any work activities. Up and about more than 50% of waking hours [Normal] : affect appropriate [de-identified] : +trace pitting edema of b/l lower ext with dry skin on ankles [de-identified] : +psoriasis on ankles

## 2021-03-12 ENCOUNTER — APPOINTMENT (OUTPATIENT)
Dept: CARDIOLOGY | Facility: CLINIC | Age: 61
End: 2021-03-12

## 2021-03-18 ENCOUNTER — RESULT REVIEW (OUTPATIENT)
Age: 61
End: 2021-03-18

## 2021-03-18 ENCOUNTER — APPOINTMENT (OUTPATIENT)
Dept: HEMATOLOGY ONCOLOGY | Facility: CLINIC | Age: 61
End: 2021-03-18
Payer: MEDICAID

## 2021-03-18 VITALS
DIASTOLIC BLOOD PRESSURE: 90 MMHG | RESPIRATION RATE: 18 BRPM | SYSTOLIC BLOOD PRESSURE: 153 MMHG | HEIGHT: 66.93 IN | TEMPERATURE: 98.4 F | BODY MASS INDEX: 21.82 KG/M2 | HEART RATE: 67 BPM | WEIGHT: 139 LBS | OXYGEN SATURATION: 99 %

## 2021-03-18 PROCEDURE — 99072 ADDL SUPL MATRL&STAF TM PHE: CPT

## 2021-03-18 PROCEDURE — 99215 OFFICE O/P EST HI 40 MIN: CPT

## 2021-03-18 NOTE — ASSESSMENT
[FreeTextEntry1] : Relapsed IgG Kappa multiple myeloma \par Diagnosed 2018 s/p RVD x 3 cycles.\par Developed severe skin reaction to revlimid and was supposed to be switched ot pomalyst.\par However did not want stem cell transplant and decided to stop follow up\par Presented Feb 2020 with low back pain found to have diffuse bone metastases\par Bone marrow (3/20): > 70% clonal plasma cells\par Hyperdiploidy and gains of chromosomes 3, 5, 9, 11, 15 and 19, as well as, rearrangements of 1p  are recurrently seen in plasma cell disorders.  Complex karyotypic changes are generally associated with an unfavorable clinical course\par FISH myeloma-  Three copies of CCND1 detected (15.5%)\par \par #On daratuumab kyprolis dexamethasone ( 5/5/20-present) \par Daratumumab # 23  W1 of Kyprolis \par Tolerating well\par Labs reviewed and discussed\par Acyclovir 400mg bid \par He is now on daratumumab [id number] and kyprolis 3 weeks on 1 week off\par Patient is clinically doing much better since started treatment with weight gained and no longer needing walker. He is concerned that he's still not in complete remission and still needing treatment and needs reassurance that he's doing well with abs showing undetectable M spike and trending down paraproteins \par His main worries are the bone lesions - advised him to have another restaging bone scan (last scan was Sept 2020) which he has been postponing. \par \par b/l lower ext edema \par Likely from kyprolis\par No sob. Lung exam normal\par Compression stockings and leg elevation advised\par \par #Low back pain\par Refer to IR for L4 kyphoplasty but patient continues  to hold off for now.\par Oxycodone 5 mg prn\par \par Bone metastases - \par pet/ct scan denied. bone scan as stated above - to have new bone scan which patient wants to reconsider\par Completed Radiation C6-T3 spine, completed 5/20/20\par continue with Oxycodone 5mg prn.\par Stressed to take Ca 600mg bid.\par given 2/25/21\par \par #HTN - on Amlodipine 10 mg - advised to take daily and to follow up with Dr. Rodriguez. \par \par d/w Dr. Quinones \par Follow up in 1 week for Daratumumab # 22 W2 of Kyprolis \par CBC, CMP, \par \par Contact\par Nadia Robert (sister) - [phone]\par \par

## 2021-03-18 NOTE — CONSULT LETTER
[Courtesy Letter:] : I had the pleasure of seeing your patient, [unfilled], in my office today. [FreeTextEntry3] : Aubrey Quinones MD, MPH\par Attending Physician\par Hematology Oncology\par [facility] [place]\par [facility]\par

## 2021-03-18 NOTE — RESULTS/DATA
[FreeTextEntry1] : Labs reviewed, analyzed and discussed\par 3/18/21 wbc 7.5 Hgb 12.7 hct 40.7 ptls 564

## 2021-03-18 NOTE — HISTORY OF PRESENT ILLNESS
[de-identified] : Mr. Rolf Robert is 60 year old male with IgG Kappa multiple myeloma transferred care from Dr. Amy Vo. \par \par Patient initially diagnosed with Multiple myeloma in 2018 and received 3 cycles of RVD but developed a severe skin reaction and was switched to Pomalyst/Pomalidomide.   \par After 2 months he was recommended stem cell transplant-> he was completely against transplant and was lost to follow up\par \par In Feb 2020, he developed low back pain radiating down to both hips. CT Pelvis done demonstrating severe joint space narrowing involving the right hip, moderate osteophytic changes on the left. Abnormal appearance of osseous structures of the pelvis with multiple lytic foci throughout the pelvis with a permeative appearance of the cortex of bilateral iliac bones. Mild compression deformity at L4 age indeterminant noted. \par B/l hips pain persisted with 3/2020 X-ray of pelvis noted multifocal lytic lesions throughout the bone.  PT was then by neurosurgery on 3/30/20 who recommended rad onc with MRI on 3/31/20 noted moderate compression fracture deformity involving C7 vertebral body with retropulsion of bone and mild bony central canal stenosis without cord compression or epidural extension. A completely collapsed T2 vertebral body with mild retropulsion of bone into spinal canal with moderate central canal stenosis with indentation of the ventral cord. No significant epidural tumor extension present and no edema noted within the adjacent spinal cord. Moderate pathologic compression fracture of T6 without retropulsion of bone/cord compression. Mild compression fracture of T7 without retropulsion of bone. Mild of T8 with mild retropulsion of bone and mild central canal stenosis. Moderate pathologic compression fracture of T10 without retropulsion/compression. Minimal pathologic compression of inferior endplate of T11. Mild pathologic compression involving T12 and L3. Moderate pathologic compression fracture involving L4 stable compared to prior CT. Mild retropulsion of bone present.\par \par Patient had bone marrow biopsy performed among other MM tests which demonstrated plasma cell neoplasm > 70%. \par Hyperdiploidy and gains of chromosomes 3, 5, 9, 11, 15 and 19, as well as, rearrangements of 1p  are recurrently seen in plasma cell disorders.  Complex karyotypic changes are generally associated with an unfavorable clinical course\par FISH myeloma-  Three copies of CCND1 detected (15.5%)\par \par 4/2/20 skeletal survey demonstrated scattered lytic lesions in the calvarium, spine, and pelvis without fracture or dislocation. Multilevel spondylosis and vertebral wedge compression deformities noted.\par \par Patient was then seen by radiation oncologist Dr. Rica Villalpando on 4/24/20.\par \par \par 1/6/2021 MRI L spine\par Improvement in abnormal bone marrow signal consistent with multiple myeloma.\par Multiple pathologic compression fractures. No acute compression fracture. Worsening of L4\par compression fracture since previous MRI 3/31/2020 with increasing retropulsion of bone resulting in\par right lateral recess stenosis and right L4-L5 foramen stenosis. There is a fluid "cleft" in the L4\par vertebral body suggesting underlying avascular necrosis.\par Although the compression fractures are pathologic fractures consistent with underlying myeloma,\par there is no evidence of tumor extending through vertebral body cortex into the epidural space.\par  [de-identified] : He is seen today for Daratumumab # 23 W1 of Kyprolis \par Current treatment:  daratumumab kyprolis dexamethasone - (5/5/20-present) \par \par Patient has yet gotten his bone scan and has yet scheduled for follow up with Dr. Day. His b/l lower minimal edema stays the same but stating his gait has been different, jerking and weakness of right foot. He is, however, not using cane and refusing PT.  \par Nausea post chemo but no vomiting, refusing any anti-emetic. \par \par Weight = 120->121-> 124 lbs -> 128 -> 126 lbs -->124 -> 127  ->134 lbs -> 131 lbs ->136 lbs -> 146 lbs ->147lbs -> 146 ->147lbs ->150lbs ->148lbs ->149lbs -> 144 lbs -> 147 -> 140lbs -> 147 -> 145 lbs -> 148lbs -> 143 lbs -> 144lbs -> 139 lbs \par \par

## 2021-03-18 NOTE — PHYSICAL EXAM
[Ambulatory and capable of all self care but unable to carry out any work activities] : Status 2- Ambulatory and capable of all self care but unable to carry out any work activities. Up and about more than 50% of waking hours [Normal] : affect appropriate [de-identified] : +trace pitting edema of b/l lower ext with dry skin on ankles [de-identified] : +psoriasis on ankles

## 2021-03-25 ENCOUNTER — RESULT REVIEW (OUTPATIENT)
Age: 61
End: 2021-03-25

## 2021-03-25 ENCOUNTER — APPOINTMENT (OUTPATIENT)
Dept: HEMATOLOGY ONCOLOGY | Facility: CLINIC | Age: 61
End: 2021-03-25
Payer: MEDICAID

## 2021-03-25 VITALS
HEIGHT: 66.93 IN | HEART RATE: 94 BPM | OXYGEN SATURATION: 98 % | SYSTOLIC BLOOD PRESSURE: 151 MMHG | TEMPERATURE: 98.9 F | RESPIRATION RATE: 18 BRPM | DIASTOLIC BLOOD PRESSURE: 93 MMHG | WEIGHT: 148 LBS | BODY MASS INDEX: 23.23 KG/M2

## 2021-03-25 PROCEDURE — 99214 OFFICE O/P EST MOD 30 MIN: CPT

## 2021-03-25 PROCEDURE — 99072 ADDL SUPL MATRL&STAF TM PHE: CPT

## 2021-03-25 NOTE — CONSULT LETTER
[Courtesy Letter:] : I had the pleasure of seeing your patient, [unfilled], in my office today. [FreeTextEntry3] : Aubrey Quinones MD, MPH\par Attending Physician\par Hematology Oncology\par [facility] [place]\par [facility]\par

## 2021-03-25 NOTE — HISTORY OF PRESENT ILLNESS
[de-identified] : Mr. Rolf Robert is 60 year old male with IgG Kappa multiple myeloma transferred care from Dr. Amy Vo. \par \par Patient initially diagnosed with Multiple myeloma in 2018 and received 3 cycles of RVD but developed a severe skin reaction and was switched to Pomalyst/Pomalidomide.   \par After 2 months he was recommended stem cell transplant-> he was completely against transplant and was lost to follow up\par \par In Feb 2020, he developed low back pain radiating down to both hips. CT Pelvis done demonstrating severe joint space narrowing involving the right hip, moderate osteophytic changes on the left. Abnormal appearance of osseous structures of the pelvis with multiple lytic foci throughout the pelvis with a permeative appearance of the cortex of bilateral iliac bones. Mild compression deformity at L4 age indeterminant noted. \par B/l hips pain persisted with 3/2020 X-ray of pelvis noted multifocal lytic lesions throughout the bone.  PT was then by neurosurgery on 3/30/20 who recommended rad onc with MRI on 3/31/20 noted moderate compression fracture deformity involving C7 vertebral body with retropulsion of bone and mild bony central canal stenosis without cord compression or epidural extension. A completely collapsed T2 vertebral body with mild retropulsion of bone into spinal canal with moderate central canal stenosis with indentation of the ventral cord. No significant epidural tumor extension present and no edema noted within the adjacent spinal cord. Moderate pathologic compression fracture of T6 without retropulsion of bone/cord compression. Mild compression fracture of T7 without retropulsion of bone. Mild of T8 with mild retropulsion of bone and mild central canal stenosis. Moderate pathologic compression fracture of T10 without retropulsion/compression. Minimal pathologic compression of inferior endplate of T11. Mild pathologic compression involving T12 and L3. Moderate pathologic compression fracture involving L4 stable compared to prior CT. Mild retropulsion of bone present.\par \par Patient had bone marrow biopsy performed among other MM tests which demonstrated plasma cell neoplasm > 70%. \par Hyperdiploidy and gains of chromosomes 3, 5, 9, 11, 15 and 19, as well as, rearrangements of 1p  are recurrently seen in plasma cell disorders.  Complex karyotypic changes are generally associated with an unfavorable clinical course\par FISH myeloma-  Three copies of CCND1 detected (15.5%)\par \par 4/2/20 skeletal survey demonstrated scattered lytic lesions in the calvarium, spine, and pelvis without fracture or dislocation. Multilevel spondylosis and vertebral wedge compression deformities noted.\par \par Patient was then seen by radiation oncologist Dr. Rica Villalpando on 4/24/20.\par \par \par 1/6/2021 MRI L spine\par Improvement in abnormal bone marrow signal consistent with multiple myeloma.\par Multiple pathologic compression fractures. No acute compression fracture. Worsening of L4\par compression fracture since previous MRI 3/31/2020 with increasing retropulsion of bone resulting in\par right lateral recess stenosis and right L4-L5 foramen stenosis. There is a fluid "cleft" in the L4\par vertebral body suggesting underlying avascular necrosis.\par Although the compression fractures are pathologic fractures consistent with underlying myeloma,\par there is no evidence of tumor extending through vertebral body cortex into the epidural space.\par  [de-identified] : He is seen today for Daratumumab # 23 W2 of Kyprolis \par Current treatment:  daratumumab kyprolis dexamethasone - (5/5/20-present) \par \par Patient is here reports of feeling down due to not being able to work, he used to work in construction prior to getting sick. Admits of being depressed but doesn't want to be on any anti-depressant, he has yet scheduled to see a PCP. \par Low back pain changes in severity, now minimal and takes 1-2 Oxycodone per day. \par Nausea post chemo but no vomiting, refusing any anti-emetic. \par \par Weight = 120->121-> 124 lbs -> 128 -> 126 lbs -->124 -> 127  ->134 lbs -> 131 lbs ->136 lbs -> 146 lbs ->147lbs -> 146 ->147lbs ->150lbs ->148lbs ->149lbs -> 144 lbs -> 147 -> 140lbs -> 147 -> 145 lbs -> 148lbs -> 143 lbs -> 144lbs -> 139 lbs -> 148 lbs\par \par

## 2021-03-25 NOTE — ASSESSMENT
[FreeTextEntry1] : Relapsed IgG Kappa multiple myeloma \par Diagnosed 2018 s/p RVD x 3 cycles.\par Developed severe skin reaction to revlimid and was supposed to be switched ot pomalyst.\par However did not want stem cell transplant and decided to stop follow up\par Presented Feb 2020 with low back pain found to have diffuse bone metastases\par Bone marrow (3/20): > 70% clonal plasma cells\par Hyperdiploidy and gains of chromosomes 3, 5, 9, 11, 15 and 19, as well as, rearrangements of 1p  are recurrently seen in plasma cell disorders.  Complex karyotypic changes are generally associated with an unfavorable clinical course\par FISH myeloma-  Three copies of CCND1 detected (15.5%)\par \par #On daratuumab kyprolis dexamethasone ( 5/5/20-present) \par Daratumumab # 23  W2 of Kyprolis \par Tolerating well\par Labs reviewed and discussed\par Acyclovir 400mg bid \par He is now on daratumumab [id number] and kyprolis 3 weeks on 1 week off\par Patient is clinically doing much better since started treatment with weight gained and no longer needing walker. \par His main worries are the bone lesions - advised him to have another restaging bone scan (last scan was Sept 2020) which he'll schedule after this cycle. \par -FLCR increasing to 2.06 (previously 1.12) with M spike at 0.1 - will repeat in 2 weeks - if continues to be elevated, will consider another bone marrow. \par \par #depression and fatigue\par -does not want to be on any anti-depressant.\par reached out to sister to have her make appointment with Dr. Day. \par \par b/l lower ext edema \par Likely from kyprolis\par No sob. Lung exam normal\par Compression stockings and leg elevation advised\par \par #Low back pain\par Refer to IR for L4 kyphoplasty but patient continues  to hold off for now.\par Oxycodone 5 mg prn\par \par Bone metastases - \par pet/ct scan denied. bone scan as stated above - to have new bone scan which patient wants to reconsider\par Completed Radiation C6-T3 spine, completed 5/20/20\par continue with Oxycodone 5mg prn.\par Stressed to take Ca 600mg bid.\par given 2/25/21 - will next treatment\par \par #HTN - on Amlodipine 10 mg - advised to take daily and to follow up with Dr. Rodriguez. \par \par d/w Dr. Quinones \par Follow up in 1 week for Daratumumab # 22 W3 of Kyprolis \par CBC, CMP, \par \par Contact\par Nadia Robert ([facility]) - [phone]\par \par

## 2021-03-25 NOTE — PHYSICAL EXAM
[Ambulatory and capable of all self care but unable to carry out any work activities] : Status 2- Ambulatory and capable of all self care but unable to carry out any work activities. Up and about more than 50% of waking hours [Normal] : affect appropriate [de-identified] : +trace pitting edema of b/l lower ext with dry skin on ankles [de-identified] : +psoriasis on ankles

## 2021-04-01 ENCOUNTER — APPOINTMENT (OUTPATIENT)
Dept: HEMATOLOGY ONCOLOGY | Facility: CLINIC | Age: 61
End: 2021-04-01
Payer: MEDICAID

## 2021-04-01 ENCOUNTER — RESULT REVIEW (OUTPATIENT)
Age: 61
End: 2021-04-01

## 2021-04-01 VITALS
TEMPERATURE: 98.7 F | OXYGEN SATURATION: 98 % | BODY MASS INDEX: 22.13 KG/M2 | RESPIRATION RATE: 16 BRPM | HEART RATE: 98 BPM | SYSTOLIC BLOOD PRESSURE: 164 MMHG | DIASTOLIC BLOOD PRESSURE: 113 MMHG | WEIGHT: 141 LBS | HEIGHT: 66.93 IN

## 2021-04-01 PROCEDURE — 99072 ADDL SUPL MATRL&STAF TM PHE: CPT

## 2021-04-01 PROCEDURE — 99215 OFFICE O/P EST HI 40 MIN: CPT

## 2021-04-02 NOTE — ASSESSMENT
[FreeTextEntry1] : Relapsed IgG Kappa multiple myeloma \par Diagnosed 2018 s/p RVD x 3 cycles.\par Developed severe skin reaction to revlimid and was supposed to be switched ot pomalyst.\par However did not want stem cell transplant and decided to stop follow up\par Presented Feb 2020 with low back pain found to have diffuse bone metastases\par Bone marrow (3/20): > 70% clonal plasma cells\par Hyperdiploidy and gains of chromosomes 3, 5, 9, 11, 15 and 19, as well as, rearrangements of 1p  are recurrently seen in plasma cell disorders.  Complex karyotypic changes are generally associated with an unfavorable clinical course\par FISH myeloma-  Three copies of CCND1 detected (15.5%)\par \par #On daratuumab kyprolis dexamethasone ( 5/5/20-present) \par Daratumumab # 23  W3 of Kyprolis \par Tolerating well\par Labs reviewed and discussed\par Acyclovir 400mg bid \par He is now on daratumumab [id number] and kyprolis 3 weeks on 1 week off\par Patient is clinically doing much better since started treatment with weight gained and no longer needing walker\par His main worries are the bone lesions - advised him to have another restaging bone scan (last scan was Sept 2020) which he'll schedule after this cycle. \par -FLCR increasing to 2.06 (previously 1.12) with M spike at 0.1 - will repeat in 2 weeks - if continues to be elevated, will consider another bone marrow. \par -to have MRI of lumbar prior to next treatment\par -His treatment plans and clinically conditions reassured again, Dr. Quinones also spoke to patient's sister as well. \par \par #depression and fatigue\par -Starting patient on Remeron 15 mg daily - advised to give it a try for a few weeks. Patient agrees. \par -PT recommended again but patient still refuses.\par -encouraged to be more active and seek more social supports (therapy groups or even a part time job if he can).\par \par b/l lower ext edema \par Likely from kyprolis\par No sob. Lung exam normal\par Compression stockings and leg elevation advised\par \par #Low back pain\par Refer to IR for L4 kyphoplasty but patient continues  to refuse.\par Oxycodone 5 mg prn\par \par Bone metastases - \par pet/ct scan denied. bone scan as stated above - to have new bone scan which patient wants to reconsider\par Completed Radiation C6-T3 spine, completed 5/20/20\par continue with Oxycodone 5mg prn.\par Stressed to take Ca 600mg bid.\par given 2/25/21 - will next treatment\par \par #HTN - on Amlodipine 10 mg - advised to take daily and to follow up with Dr. Rodriguez. \par \par d/w Dr. Quinones \par Follow up in 2 weeks for Daratumumab # 23 W1 of Kyprolis \par CBC, CMP, \par \par Contact\par Nadia Robert (sister) - [phone]\par \par

## 2021-04-02 NOTE — PHYSICAL EXAM
[Ambulatory and capable of all self care but unable to carry out any work activities] : Status 2- Ambulatory and capable of all self care but unable to carry out any work activities. Up and about more than 50% of waking hours [Normal] : RRR, normal S1S2, no murmurs, rubs, gallops [de-identified] : +psoriasis on ankles

## 2021-04-02 NOTE — CONSULT LETTER
[Courtesy Letter:] : I had the pleasure of seeing your patient, [unfilled], in my office today. [FreeTextEntry3] : Aubrey Quinones MD, MPH\par Attending Physician\par Hematology Oncology\par [facility] [place]\par [facility]\par

## 2021-04-02 NOTE — HISTORY OF PRESENT ILLNESS
[de-identified] : Mr. Rolf Robert is 60 year old male with IgG Kappa multiple myeloma transferred care from Dr. Amy Vo. \par \par Patient initially diagnosed with Multiple myeloma in 2018 and received 3 cycles of RVD but developed a severe skin reaction and was switched to Pomalyst/Pomalidomide.   \par After 2 months he was recommended stem cell transplant-> he was completely against transplant and was lost to follow up\par \par In Feb 2020, he developed low back pain radiating down to both hips. CT Pelvis done demonstrating severe joint space narrowing involving the right hip, moderate osteophytic changes on the left. Abnormal appearance of osseous structures of the pelvis with multiple lytic foci throughout the pelvis with a permeative appearance of the cortex of bilateral iliac bones. Mild compression deformity at L4 age indeterminant noted. \par B/l hips pain persisted with 3/2020 X-ray of pelvis noted multifocal lytic lesions throughout the bone.  PT was then by neurosurgery on 3/30/20 who recommended rad onc with MRI on 3/31/20 noted moderate compression fracture deformity involving C7 vertebral body with retropulsion of bone and mild bony central canal stenosis without cord compression or epidural extension. A completely collapsed T2 vertebral body with mild retropulsion of bone into spinal canal with moderate central canal stenosis with indentation of the ventral cord. No significant epidural tumor extension present and no edema noted within the adjacent spinal cord. Moderate pathologic compression fracture of T6 without retropulsion of bone/cord compression. Mild compression fracture of T7 without retropulsion of bone. Mild of T8 with mild retropulsion of bone and mild central canal stenosis. Moderate pathologic compression fracture of T10 without retropulsion/compression. Minimal pathologic compression of inferior endplate of T11. Mild pathologic compression involving T12 and L3. Moderate pathologic compression fracture involving L4 stable compared to prior CT. Mild retropulsion of bone present.\par \par Patient had bone marrow biopsy performed among other MM tests which demonstrated plasma cell neoplasm > 70%. \par Hyperdiploidy and gains of chromosomes 3, 5, 9, 11, 15 and 19, as well as, rearrangements of 1p  are recurrently seen in plasma cell disorders.  Complex karyotypic changes are generally associated with an unfavorable clinical course\par FISH myeloma-  Three copies of CCND1 detected (15.5%)\par \par 4/2/20 skeletal survey demonstrated scattered lytic lesions in the calvarium, spine, and pelvis without fracture or dislocation. Multilevel spondylosis and vertebral wedge compression deformities noted.\par \par Patient was then seen by radiation oncologist Dr. Rica Villalpando on 4/24/20.\par \par \par 1/6/2021 MRI L spine\par Improvement in abnormal bone marrow signal consistent with multiple myeloma.\par Multiple pathologic compression fractures. No acute compression fracture. Worsening of L4\par compression fracture since previous MRI 3/31/2020 with increasing retropulsion of bone resulting in\par right lateral recess stenosis and right L4-L5 foramen stenosis. 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 He has yet tried PT. Low back pain changes in severity, now minimal and takes 1-2 Oxycodone per day. \par Nausea post chemo but no vomiting, refusing any anti-emetic. \par \par Weight = 120->121-> 124 lbs -> 128 -> 126 lbs -->124 -> 127  ->134 lbs -> 131 lbs ->136 lbs -> 146 lbs ->147lbs -> 146 ->147lbs ->150lbs ->148lbs ->149lbs -> 144 lbs -> 147 -> 140lbs -> 147 -> 145 lbs -> 148lbs -> 143 lbs -> 144lbs -> 139 lbs -> 141lbs.\par \par

## 2021-04-15 ENCOUNTER — APPOINTMENT (OUTPATIENT)
Dept: HEMATOLOGY ONCOLOGY | Facility: CLINIC | Age: 61
End: 2021-04-15

## 2021-04-22 ENCOUNTER — APPOINTMENT (OUTPATIENT)
Dept: HEMATOLOGY ONCOLOGY | Facility: CLINIC | Age: 61
End: 2021-04-22
Payer: MEDICAID

## 2021-04-22 ENCOUNTER — RESULT REVIEW (OUTPATIENT)
Age: 61
End: 2021-04-22

## 2021-04-22 VITALS
SYSTOLIC BLOOD PRESSURE: 151 MMHG | HEIGHT: 66.93 IN | HEART RATE: 108 BPM | BODY MASS INDEX: 23.39 KG/M2 | RESPIRATION RATE: 16 BRPM | OXYGEN SATURATION: 100 % | WEIGHT: 149 LBS | DIASTOLIC BLOOD PRESSURE: 96 MMHG | TEMPERATURE: 97.6 F

## 2021-04-22 DIAGNOSIS — Z00.00 ENCOUNTER FOR GENERAL ADULT MEDICAL EXAMINATION W/OUT ABNORMAL FINDINGS: ICD-10-CM

## 2021-04-22 DIAGNOSIS — F32.9 MAJOR DEPRESSIVE DISORDER, SINGLE EPISODE, UNSPECIFIED: ICD-10-CM

## 2021-04-22 PROCEDURE — 99072 ADDL SUPL MATRL&STAF TM PHE: CPT

## 2021-04-22 PROCEDURE — 99214 OFFICE O/P EST MOD 30 MIN: CPT

## 2021-04-22 NOTE — PHYSICAL EXAM
[Ambulatory and capable of all self care but unable to carry out any work activities] : Status 2- Ambulatory and capable of all self care but unable to carry out any work activities. Up and about more than 50% of waking hours [Normal] : affect appropriate [de-identified] : +psoriasis on ankles

## 2021-04-22 NOTE — CONSULT LETTER
[Courtesy Letter:] : I had the pleasure of seeing your patient, [unfilled], in my office today. [FreeTextEntry3] : Aubrey Quinones MD, MPH\par Attending Physician\par Hematology Oncology\par [facility] [place]\par [facility]\par

## 2021-04-22 NOTE — HISTORY OF PRESENT ILLNESS
[de-identified] : Mr. Rolf Robert is 60 year old male with IgG Kappa multiple myeloma transferred care from Dr. Amy Vo. \par \par Patient initially diagnosed with Multiple myeloma in 2018 and received 3 cycles of RVD but developed a severe skin reaction and was switched to Pomalyst/Pomalidomide.   \par After 2 months he was recommended stem cell transplant-> he was completely against transplant and was lost to follow up\par \par In Feb 2020, he developed low back pain radiating down to both hips. CT Pelvis done demonstrating severe joint space narrowing involving the right hip, moderate osteophytic changes on the left. Abnormal appearance of osseous structures of the pelvis with multiple lytic foci throughout the pelvis with a permeative appearance of the cortex of bilateral iliac bones. Mild compression deformity at L4 age indeterminant noted. \par B/l hips pain persisted with 3/2020 X-ray of pelvis noted multifocal lytic lesions throughout the bone.  PT was then by neurosurgery on 3/30/20 who recommended rad onc with MRI on 3/31/20 noted moderate compression fracture deformity involving C7 vertebral body with retropulsion of bone and mild bony central canal stenosis without cord compression or epidural extension. A completely collapsed T2 vertebral body with mild retropulsion of bone into spinal canal with moderate central canal stenosis with indentation of the ventral cord. No significant epidural tumor extension present and no edema noted within the adjacent spinal cord. Moderate pathologic compression fracture of T6 without retropulsion of bone/cord compression. Mild compression fracture of T7 without retropulsion of bone. Mild of T8 with mild retropulsion of bone and mild central canal stenosis. Moderate pathologic compression fracture of T10 without retropulsion/compression. Minimal pathologic compression of inferior endplate of T11. Mild pathologic compression involving T12 and L3. Moderate pathologic compression fracture involving L4 stable compared to prior CT. Mild retropulsion of bone present.\par \par Patient had bone marrow biopsy performed among other MM tests which demonstrated plasma cell neoplasm > 70%. \par Hyperdiploidy and gains of chromosomes 3, 5, 9, 11, 15 and 19, as well as, rearrangements of 1p  are recurrently seen in plasma cell disorders.  Complex karyotypic changes are generally associated with an unfavorable clinical course\par FISH myeloma-  Three copies of CCND1 detected (15.5%)\par \par 4/2/20 skeletal survey demonstrated scattered lytic lesions in the calvarium, spine, and pelvis without fracture or dislocation. Multilevel spondylosis and vertebral wedge compression deformities noted.\par \par Patient was then seen by radiation oncologist Dr. Rica Villalpando on 4/24/20.\par \par \par 1/6/2021 MRI L spine\par Improvement in abnormal bone marrow signal consistent with multiple myeloma.\par Multiple pathologic compression fractures. No acute compression fracture. Worsening of L4\par compression fracture since previous MRI 3/31/2020 with increasing retropulsion of bone resulting in\par right lateral recess stenosis and right L4-L5 foramen stenosis. There is a fluid "cleft" in the L4\par vertebral body suggesting underlying avascular necrosis.\par Although the compression fractures are pathologic fractures consistent with underlying myeloma,\par there is no evidence of tumor extending through vertebral body cortex into the epidural space.\par  [de-identified] : He is seen today for Daratumumab # 24 W1 of Kyprolis \par Current treatment:  daratumumab kyprolis dexamethasone - (5/5/20-present) \par \par Missed his appointment last week due to transportation issues. \par Patient with worries and depression, not taking his Remeron daily as instructed. He also has not having his MRI done. \par Spoke with sister Camille who reports and admits that patient has been depressive. \par Continues to eat well with weight gained. \par \par Weight = 120->121-> 124 lbs -> 128 -> 126 lbs -->124 -> 127  ->134 lbs -> 131 lbs ->136 lbs -> 146 lbs ->147lbs -> 146 ->147lbs ->150lbs ->148lbs ->149lbs -> 144 lbs -> 147 -> 140lbs -> 147 -> 145 lbs -> 148lbs -> 143 lbs -> 144lbs -> 139 lbs -> 141lbs -> 149 lbs\par \par

## 2021-04-27 ENCOUNTER — APPOINTMENT (OUTPATIENT)
Dept: INTERNAL MEDICINE | Facility: CLINIC | Age: 61
End: 2021-04-27

## 2021-05-06 ENCOUNTER — APPOINTMENT (OUTPATIENT)
Dept: HEMATOLOGY ONCOLOGY | Facility: CLINIC | Age: 61
End: 2021-05-06
Payer: MEDICAID

## 2021-05-06 ENCOUNTER — RESULT REVIEW (OUTPATIENT)
Age: 61
End: 2021-05-06

## 2021-05-06 VITALS
WEIGHT: 149 LBS | SYSTOLIC BLOOD PRESSURE: 137 MMHG | BODY MASS INDEX: 23.39 KG/M2 | RESPIRATION RATE: 18 BRPM | TEMPERATURE: 98.2 F | OXYGEN SATURATION: 98 % | HEART RATE: 98 BPM | HEIGHT: 66.93 IN | DIASTOLIC BLOOD PRESSURE: 92 MMHG

## 2021-05-06 PROCEDURE — 99214 OFFICE O/P EST MOD 30 MIN: CPT

## 2021-05-06 PROCEDURE — 99072 ADDL SUPL MATRL&STAF TM PHE: CPT

## 2021-05-06 NOTE — CONSULT LETTER
[Courtesy Letter:] : I had the pleasure of seeing your patient, [unfilled], in my office today. [FreeTextEntry3] : Aubrey Quinones MD, MPH\par Attending Physician\par Hematology Oncology\par [facility] [place]\par [facility]\par

## 2021-05-06 NOTE — ASSESSMENT
[FreeTextEntry1] : Relapsed IgG Kappa multiple myeloma \par Diagnosed 2018 s/p RVD x 3 cycles.\par Developed severe skin reaction to revlimid and was supposed to be switched ot pomalyst.\par However did not want stem cell transplant and decided to stop follow up\par Presented Feb 2020 with low back pain found to have diffuse bone metastases\par Bone marrow (3/20): > 70% clonal plasma cells\par Hyperdiploidy and gains of chromosomes 3, 5, 9, 11, 15 and 19, as well as, rearrangements of 1p  are recurrently seen in plasma cell disorders.  Complex karyotypic changes are generally associated with an unfavorable clinical course\par FISH myeloma-  Three copies of CCND1 detected (15.5%)\par \par #On daratuumab kyprolis dexamethasone ( 5/5/20-present) \par Daratumumab # 24  W3 of Kyprolis - skipped W2\par Labs reviewed and discussed\par Acyclovir 400mg bid \par He is now on daratumumab [id number] and kyprolis 3 weeks on 1 week off\par Patient is clinically doing much better since started treatment with weight gained and no longer needing walker\par -FLCR trended up to around 2 from 1. \par -patient advised to have repeat bone marrow but he declined. \par -Patient to have repeat Pet/Ct due worsening low back pain, neck pain, and b/l abdominal pain. \par \par #depression and fatigue\par -advised to be compliant with  Remeron 15 mg daily \par -encouraged to be more active and seek more social supports (therapy groups or even a part time job if he can).\par \par b/l lower ext edema \par Likely from kyprolis\par No sob. Lung exam normal\par Compression stockings and leg elevation advised\par \par #Low back pain\par Refer to IR for L4 kyphoplasty but patient continues  to refuse.\par Oxycodone 5 mg prn\par \par Bone metastases - \par pet/ct scan denied. bone scan as stated above - to have new bone scan which patient wants to reconsider\par Completed Radiation C6-T3 spine, completed 5/20/20\par continue with Oxycodone 5mg prn.\par Stressed to take Ca 600mg bid.\par Given 4/1/21\par \par #HTN - on Amlodipine 10 mg - advised to take daily and to follow up with Dr. Rodriguez. \par \par d/w Dr. Quinones \par Follow up in 1 week1 for Daratumumab # 24 W1 of Kyprolis \par CBC, CMP, \par \par Contact\par Nadia Robert ([facility]) - [phone]\par \par

## 2021-05-06 NOTE — HISTORY OF PRESENT ILLNESS
[de-identified] : Mr. Rolf Robert is 60 year old male with IgG Kappa multiple myeloma transferred care from Dr. Amy Vo. \par \par Patient initially diagnosed with Multiple myeloma in 2018 and received 3 cycles of RVD but developed a severe skin reaction and was switched to Pomalyst/Pomalidomide.   \par After 2 months he was recommended stem cell transplant-> he was completely against transplant and was lost to follow up\par \par In Feb 2020, he developed low back pain radiating down to both hips. CT Pelvis done demonstrating severe joint space narrowing involving the right hip, moderate osteophytic changes on the left. Abnormal appearance of osseous structures of the pelvis with multiple lytic foci throughout the pelvis with a permeative appearance of the cortex of bilateral iliac bones. Mild compression deformity at L4 age indeterminant noted. \par B/l hips pain persisted with 3/2020 X-ray of pelvis noted multifocal lytic lesions throughout the bone.  PT was then by neurosurgery on 3/30/20 who recommended rad onc with MRI on 3/31/20 noted moderate compression fracture deformity involving C7 vertebral body with retropulsion of bone and mild bony central canal stenosis without cord compression or epidural extension. A completely collapsed T2 vertebral body with mild retropulsion of bone into spinal canal with moderate central canal stenosis with indentation of the ventral cord. No significant epidural tumor extension present and no edema noted within the adjacent spinal cord. Moderate pathologic compression fracture of T6 without retropulsion of bone/cord compression. Mild compression fracture of T7 without retropulsion of bone. Mild of T8 with mild retropulsion of bone and mild central canal stenosis. Moderate pathologic compression fracture of T10 without retropulsion/compression. Minimal pathologic compression of inferior endplate of T11. Mild pathologic compression involving T12 and L3. Moderate pathologic compression fracture involving L4 stable compared to prior CT. Mild retropulsion of bone present.\par \par Patient had bone marrow biopsy performed among other MM tests which demonstrated plasma cell neoplasm > 70%. \par Hyperdiploidy and gains of chromosomes 3, 5, 9, 11, 15 and 19, as well as, rearrangements of 1p  are recurrently seen in plasma cell disorders.  Complex karyotypic changes are generally associated with an unfavorable clinical course\par FISH myeloma-  Three copies of CCND1 detected (15.5%)\par \par 4/2/20 skeletal survey demonstrated scattered lytic lesions in the calvarium, spine, and pelvis without fracture or dislocation. Multilevel spondylosis and vertebral wedge compression deformities noted.\par \par Patient was then seen by radiation oncologist Dr. Rica Villalpando on 4/24/20.\par \par \par 1/6/2021 MRI L spine\par Improvement in abnormal bone marrow signal consistent with multiple myeloma.\par Multiple pathologic compression fractures. No acute compression fracture. Worsening of L4\par compression fracture since previous MRI 3/31/2020 with increasing retropulsion of bone resulting in\par right lateral recess stenosis and right L4-L5 foramen stenosis. There is a fluid "cleft" in the L4\par vertebral body suggesting underlying avascular necrosis.\par Although the compression fractures are pathologic fractures consistent with underlying myeloma,\par there is no evidence of tumor extending through vertebral body cortex into the epidural space.\par  [de-identified] : He is seen today for Daratumumab # 24 W3 of Kyprolis - skipped Week 2 last week. \par Current treatment:  daratumumab kyprolis dexamethasone - (5/5/20-present) \par \par Patient skipped his last week treatment due to transportation again. He continues to have low back  and b/l abdominal pain with tightness, comes and goes.  \par Still not compliant with Remeron daily.\par \par Weight = 120->121-> 124 lbs -> 128 -> 126 lbs -->124 -> 127  ->134 lbs -> 131 lbs ->136 lbs -> 146 lbs ->147lbs -> 146 ->147lbs ->150lbs ->148lbs ->149lbs -> 144 lbs -> 147 -> 140lbs -> 147 -> 145 lbs -> 148lbs -> 143 lbs -> 144lbs -> 139 lbs -> 141lbs -> 149 lbs\par \par

## 2021-05-26 ENCOUNTER — APPOINTMENT (OUTPATIENT)
Dept: HEMATOLOGY ONCOLOGY | Facility: CLINIC | Age: 61
End: 2021-05-26

## 2021-06-02 ENCOUNTER — APPOINTMENT (OUTPATIENT)
Dept: HEMATOLOGY ONCOLOGY | Facility: CLINIC | Age: 61
End: 2021-06-02

## 2021-06-02 ENCOUNTER — NON-APPOINTMENT (OUTPATIENT)
Age: 61
End: 2021-06-02

## 2021-06-08 ENCOUNTER — NON-APPOINTMENT (OUTPATIENT)
Age: 61
End: 2021-06-08

## 2021-06-09 ENCOUNTER — APPOINTMENT (OUTPATIENT)
Dept: HEMATOLOGY ONCOLOGY | Facility: CLINIC | Age: 61
End: 2021-06-09
Payer: MEDICAID

## 2021-06-09 ENCOUNTER — RESULT REVIEW (OUTPATIENT)
Age: 61
End: 2021-06-09

## 2021-06-09 VITALS
HEIGHT: 66.93 IN | DIASTOLIC BLOOD PRESSURE: 87 MMHG | OXYGEN SATURATION: 99 % | RESPIRATION RATE: 16 BRPM | HEART RATE: 99 BPM | BODY MASS INDEX: 22.44 KG/M2 | SYSTOLIC BLOOD PRESSURE: 137 MMHG | WEIGHT: 143 LBS | TEMPERATURE: 97.6 F

## 2021-06-09 PROCEDURE — 99214 OFFICE O/P EST MOD 30 MIN: CPT

## 2021-06-10 NOTE — CONSULT LETTER
[FreeTextEntry3] : Aubrey Quinones MD, MPH\par Attending Physician\par Hematology Oncology\par [facility] [place]\par [facility]\par

## 2021-06-10 NOTE — HISTORY OF PRESENT ILLNESS
[de-identified] : Mr. Rolf Robert is 60 year old male with IgG Kappa multiple myeloma transferred care from Dr. Amy Vo. \par \par Patient initially diagnosed with Multiple myeloma in 2018 and received 3 cycles of RVD but developed a severe skin reaction and was switched to Pomalyst/Pomalidomide.   \par After 2 months he was recommended stem cell transplant-> he was completely against transplant and was lost to follow up\par \par In Feb 2020, he developed low back pain radiating down to both hips. CT Pelvis done demonstrating severe joint space narrowing involving the right hip, moderate osteophytic changes on the left. Abnormal appearance of osseous structures of the pelvis with multiple lytic foci throughout the pelvis with a permeative appearance of the cortex of bilateral iliac bones. Mild compression deformity at L4 age indeterminant noted. \par B/l hips pain persisted with 3/2020 X-ray of pelvis noted multifocal lytic lesions throughout the bone.  PT was then by neurosurgery on 3/30/20 who recommended rad onc with MRI on 3/31/20 noted moderate compression fracture deformity involving C7 vertebral body with retropulsion of bone and mild bony central canal stenosis without cord compression or epidural extension. A completely collapsed T2 vertebral body with mild retropulsion of bone into spinal canal with moderate central canal stenosis with indentation of the ventral cord. No significant epidural tumor extension present and no edema noted within the adjacent spinal cord. Moderate pathologic compression fracture of T6 without retropulsion of bone/cord compression. Mild compression fracture of T7 without retropulsion of bone. Mild of T8 with mild retropulsion of bone and mild central canal stenosis. Moderate pathologic compression fracture of T10 without retropulsion/compression. Minimal pathologic compression of inferior endplate of T11. Mild pathologic compression involving T12 and L3. Moderate pathologic compression fracture involving L4 stable compared to prior CT. Mild retropulsion of bone present.\par \par Patient had bone marrow biopsy performed among other MM tests which demonstrated plasma cell neoplasm > 70%. \par Hyperdiploidy and gains of chromosomes 3, 5, 9, 11, 15 and 19, as well as, rearrangements of 1p  are recurrently seen in plasma cell disorders.  Complex karyotypic changes are generally associated with an unfavorable clinical course\par FISH myeloma-  Three copies of CCND1 detected (15.5%)\par \par 4/2/20 skeletal survey demonstrated scattered lytic lesions in the calvarium, spine, and pelvis without fracture or dislocation. Multilevel spondylosis and vertebral wedge compression deformities noted.\par \par Patient was then seen by radiation oncologist Dr. Rica Villalpando on 4/24/20.\par \par \par 1/6/2021 MRI L spine\par Improvement in abnormal bone marrow signal consistent with multiple myeloma.\par Multiple pathologic compression fractures. No acute compression fracture. Worsening of L4\par compression fracture since previous MRI 3/31/2020 with increasing retropulsion of bone resulting in\par right lateral recess stenosis and right L4-L5 foramen stenosis. There is a fluid "cleft" in the L4\par vertebral body suggesting underlying avascular necrosis.\par Although the compression fractures are pathologic fractures consistent with underlying myeloma,\par there is no evidence of tumor extending through vertebral body cortex into the epidural space.\par  [de-identified] : He is seen today for Daratumumab # 25 W1 of Kyprolis\par Current treatment:  daratumumab kyprolis dexamethasone - (5/5/20 - present) \par \par Patient has skipped his treatment since 5/6/21 due to transportation issues.. His sister has not scheduled him for Pet/Ct scan. He still has persistent left abdominal pain, low back pain, and lately noticing he has been losing muscle around his shoulders with more curvature of upper thoracic and cervical spines. \par He stopped Amlodipine in the last few days due to running out of medication. \par \par Weight = 120->121-> 124 lbs -> 128 -> 126 lbs -->124 -> 127  ->134 lbs -> 131 lbs ->136 lbs -> 146 lbs ->147lbs -> 146 ->147lbs ->150lbs ->148lbs ->149lbs -> 144 lbs -> 147 -> 140lbs -> 147 -> 145 lbs -> 148lbs -> 143 lbs -> 144lbs -> 139 lbs -> 141lbs -> 149 lbs -> 143 lbs \par \par

## 2021-06-10 NOTE — RESULTS/DATA
[FreeTextEntry1] : Labs reviewed, analyzed and discussed\par 6/9/21 wbc 6.7 Hgb 12.9 hct 40.6 plts 445

## 2021-06-10 NOTE — ASSESSMENT
[FreeTextEntry1] : Relapsed IgG Kappa multiple myeloma \par Diagnosed 2018 s/p RVD x 3 cycles.\par Developed severe skin reaction to revlimid and was supposed to be switched ot pomalyst.\par However did not want stem cell transplant and decided to stop follow up\par Presented Feb 2020 with low back pain found to have diffuse bone metastases\par Bone marrow (3/20): > 70% clonal plasma cells\par Hyperdiploidy and gains of chromosomes 3, 5, 9, 11, 15 and 19, as well as, rearrangements of 1p  are recurrently seen in plasma cell disorders.  Complex karyotypic changes are generally associated with an unfavorable clinical course\par FISH myeloma-  Three copies of CCND1 detected (15.5%)\par \par #On daratuumab kyprolis dexamethasone ( 5/5/20-present) \par Daratumumab # 25  W1 of Kyprolis \par Labs reviewed and discussed\par Acyclovir 400mg bid \par He is now on daratumumab [id number] and kyprolis 3 weeks on 1 week off\par Patient is clinically doing much better since started treatment with weight gained and no longer needing walker\par -FLCR trended up to around 2 from 1. \par -patient advised to have repeat bone marrow but he declined. \par -Patient to have repeat Pet/Ct due worsening low back pain, neck pain, and b/l abdominal pain - has yet made an appointment. \par \par #depression and fatigue\par -Started patient on Remeron which he has not been compliant on so advised to d/c. \par -encouraged to be more active and seek more social supports (therapy groups or even a part time job if he can).\par \par b/l lower ext edema \par Likely from kyprolis\par No sob. Lung exam normal\par Compression stockings and leg elevation advised\par \par #Low back pain\par Refer to IR for L4 kyphoplasty but patient continues  to refuse.\par Oxycodone 5 mg prn\par \par Bone metastases - \par pet/ct scan denied. bone scan as stated above - to have new bone scan which patient wants to reconsider\par Completed Radiation C6-T3 spine, completed 5/20/20\par continue with Oxycodone 5mg prn.\par Stressed to take Ca 600mg bid.\par Given 6/10/21\par \par #HTN - on Amlodipine 10 mg - advised to take daily and to follow up with Dr. Rodriguez. \par \par d/w Dr. Quinones \par Follow up in 1 week for w2 of Kyprolis \par CBC, CMP, \par \par Contact\par Nadia Robert (sister) - [phone]\par \par

## 2021-06-12 NOTE — REVIEW OF SYSTEMS
[FreeTextEntry2] : Review of systems negative except as outlined in HPI [Abdomen Masses] : No abdominal masses [Abdomen Tenderness] : ~T ~M Abdominal tenderness [Respiratory Effort] : normal respiratory effort [Normal Rate and Rhythm] : normal rate and rhythm [de-identified] : awake, alert and in no acute distress [de-identified] : mild lower abdominal TTP.  Soft, obese, non distended.

## 2021-06-16 ENCOUNTER — RESULT REVIEW (OUTPATIENT)
Age: 61
End: 2021-06-16

## 2021-06-16 ENCOUNTER — APPOINTMENT (OUTPATIENT)
Dept: HEMATOLOGY ONCOLOGY | Facility: CLINIC | Age: 61
End: 2021-06-16

## 2021-06-16 ENCOUNTER — APPOINTMENT (OUTPATIENT)
Dept: HEMATOLOGY ONCOLOGY | Facility: CLINIC | Age: 61
End: 2021-06-16
Payer: MEDICAID

## 2021-06-16 VITALS
WEIGHT: 145 LBS | TEMPERATURE: 97.5 F | RESPIRATION RATE: 18 BRPM | HEIGHT: 66.93 IN | HEART RATE: 96 BPM | OXYGEN SATURATION: 99 % | BODY MASS INDEX: 22.76 KG/M2 | SYSTOLIC BLOOD PRESSURE: 138 MMHG | DIASTOLIC BLOOD PRESSURE: 93 MMHG

## 2021-06-16 DIAGNOSIS — R29.890 LOSS OF HEIGHT: ICD-10-CM

## 2021-06-16 PROCEDURE — 99215 OFFICE O/P EST HI 40 MIN: CPT

## 2021-06-16 NOTE — ASSESSMENT
[FreeTextEntry1] : Relapsed IgG Kappa multiple myeloma \par Diagnosed 2018 s/p RVD x 3 cycles.\par Developed severe skin reaction to revlimid and was supposed to be switched ot pomalyst.\par However did not want stem cell transplant and decided to stop follow up\par Presented Feb 2020 with low back pain found to have diffuse bone metastases\par Bone marrow (3/20): > 70% clonal plasma cells\par Hyperdiploidy and gains of chromosomes 3, 5, 9, 11, 15 and 19, as well as, rearrangements of 1p  are recurrently seen in plasma cell disorders.  Complex karyotypic changes are generally associated with an unfavorable clinical course\par FISH myeloma-  Three copies of CCND1 detected (15.5%)\par #On daratuumab kyprolis dexamethasone ( 5/5/20-present) \par Daratumumab # 25  W2 of Kyprolis \par Labs reviewed and discussed\par Acyclovir 400mg bid \par He is now on daratumumab [id number] and kyprolis 3 weeks on 1 week off\par Patient is clinically doing much better since started treatment with weight gained and no longer needing walker\par -FLCR trended up to around 2 from 1. \par M spike - unable to quantitate\par -patient advised to have repeat bone marrow but he declined. \par -Patient to have repeat Pet/Ct due worsening low back pain, neck pain, and b/l abdominal pain - has yet made an appointment. \par \par Loss of height\par Excessive abdominal fat\par Explained that loss of height is from myeloma and is likely going to be permanent\par Abdominal fat is related to his diet and body habitus\par Advised to establish with PCP- referral given\par \par Low back pain\par Needs to have PET/CT\par Tylenol PRN\par If worse- use tramadol\par \par #depression and fatigue\par -Started patient on Remeron which he has not been compliant on so advised to d/c. \par -encouraged to be more active and seek more social supports (therapy groups or even a part time job if he can).\par \par b/l lower ext edema \par Likely from kyprolis\par No sob. Lung exam normal\par Compression stockings and leg elevation advised\par \par #Low back pain\par Refer to IR for L4 kyphoplasty but patient continues  to refuse.\par Oxycodone 5 mg prn\par \par Bone metastases - \par pet/ct scan denied. bone scan as stated above - to have new bone scan which patient wants to reconsider\par Completed Radiation C6-T3 spine, completed 5/20/20\par continue with Oxycodone 5mg prn.\par Stressed to take Ca 600mg bid.\par Given 6/10/21\par \par #HTN - on Amlodipine 10 mg - advised to take daily and to follow up with Dr. Rodriguez. \par \par Follow up in 1 week for w3 of Kyprolis. Myeloma panel on the day he starts a new cycle \par CBC, CMP, \par \par Contact\par Nadia Robert (sister) - [phone]\par \par

## 2021-06-16 NOTE — HISTORY OF PRESENT ILLNESS
[de-identified] : Mr. Rolf Robert is 60 year old male with IgG Kappa multiple myeloma transferred care from Dr. Amy Vo. \par \par Patient initially diagnosed with Multiple myeloma in 2018 and received 3 cycles of RVD but developed a severe skin reaction and was switched to Pomalyst/Pomalidomide.   \par After 2 months he was recommended stem cell transplant-> he was completely against transplant and was lost to follow up\par \par In Feb 2020, he developed low back pain radiating down to both hips. CT Pelvis done demonstrating severe joint space narrowing involving the right hip, moderate osteophytic changes on the left. Abnormal appearance of osseous structures of the pelvis with multiple lytic foci throughout the pelvis with a permeative appearance of the cortex of bilateral iliac bones. Mild compression deformity at L4 age indeterminant noted. \par B/l hips pain persisted with 3/2020 X-ray of pelvis noted multifocal lytic lesions throughout the bone.  PT was then by neurosurgery on 3/30/20 who recommended rad onc with MRI on 3/31/20 noted moderate compression fracture deformity involving C7 vertebral body with retropulsion of bone and mild bony central canal stenosis without cord compression or epidural extension. A completely collapsed T2 vertebral body with mild retropulsion of bone into spinal canal with moderate central canal stenosis with indentation of the ventral cord. No significant epidural tumor extension present and no edema noted within the adjacent spinal cord. Moderate pathologic compression fracture of T6 without retropulsion of bone/cord compression. Mild compression fracture of T7 without retropulsion of bone. Mild of T8 with mild retropulsion of bone and mild central canal stenosis. Moderate pathologic compression fracture of T10 without retropulsion/compression. Minimal pathologic compression of inferior endplate of T11. Mild pathologic compression involving T12 and L3. Moderate pathologic compression fracture involving L4 stable compared to prior CT. Mild retropulsion of bone present.\par \par Patient had bone marrow biopsy performed among other MM tests which demonstrated plasma cell neoplasm > 70%. \par Hyperdiploidy and gains of chromosomes 3, 5, 9, 11, 15 and 19, as well as, rearrangements of 1p  are recurrently seen in plasma cell disorders.  Complex karyotypic changes are generally associated with an unfavorable clinical course\par FISH myeloma-  Three copies of CCND1 detected (15.5%)\par \par 4/2/20 skeletal survey demonstrated scattered lytic lesions in the calvarium, spine, and pelvis without fracture or dislocation. Multilevel spondylosis and vertebral wedge compression deformities noted.\par \par Patient was then seen by radiation oncologist Dr. Rica Villalpando on 4/24/20.\par \par \par 1/6/2021 MRI L spine\par Improvement in abnormal bone marrow signal consistent with multiple myeloma.\par Multiple pathologic compression fractures. No acute compression fracture. Worsening of L4\par compression fracture since previous MRI 3/31/2020 with increasing retropulsion of bone resulting in\par right lateral recess stenosis and right L4-L5 foramen stenosis. There is a fluid "cleft" in the L4\par vertebral body suggesting underlying avascular necrosis.\par Although the compression fractures are pathologic fractures consistent with underlying myeloma,\par there is no evidence of tumor extending through vertebral body cortex into the epidural space.\par \par Patient has skipped his treatment since 5/6/21 due to transportation issues.. His sister has not scheduled him for Pet/Ct scan. He still has persistent left abdominal pain, low back pain, and lately noticing he has been losing muscle around his shoulders with more curvature of upper thoracic and cervical spines. \par He stopped Amlodipine in the last few days due to running out of medication. \par  [de-identified] : He is seen today for Daratumumab # 25 W2 of Kyprolis\par Current treatment:  daratumumab kyprolis dexamethasone - (5/5/20 - present) \par \par He co pain lower back\par He does not take anything for pain\par He has not yet scheduled PET/CT which has been pending for a while now\par \par He complains of his "beer belly" and the fact that he has lost height since the diagnosis of myeloma\par \par Weight = 120->121-> 124 lbs -> 128 -> 126 lbs -->124 -> 127  ->134 lbs -> 131 lbs ->136 lbs -> 146 lbs ->147lbs -> 146 ->147lbs ->150lbs ->148lbs ->149lbs -> 144 lbs -> 147 -> 140lbs -> 147 -> 145 lbs -> 148lbs -> 143 lbs -> 144lbs -> 139 lbs -> 141lbs -> 149 lbs -> 143 lbs \par \par

## 2021-06-16 NOTE — CONSULT LETTER
[FreeTextEntry3] : Aubrey Quinones MD, MPH\par Attending Physician\par Hematology Oncology\par [facility]\par [facility]\par

## 2021-06-23 ENCOUNTER — RESULT REVIEW (OUTPATIENT)
Age: 61
End: 2021-06-23

## 2021-06-23 ENCOUNTER — APPOINTMENT (OUTPATIENT)
Dept: HEMATOLOGY ONCOLOGY | Facility: CLINIC | Age: 61
End: 2021-06-23
Payer: MEDICAID

## 2021-06-23 VITALS
HEIGHT: 66.93 IN | DIASTOLIC BLOOD PRESSURE: 109 MMHG | BODY MASS INDEX: 22.13 KG/M2 | OXYGEN SATURATION: 99 % | WEIGHT: 141 LBS | TEMPERATURE: 99 F | SYSTOLIC BLOOD PRESSURE: 150 MMHG | RESPIRATION RATE: 18 BRPM | HEART RATE: 84 BPM

## 2021-06-23 PROCEDURE — 99214 OFFICE O/P EST MOD 30 MIN: CPT

## 2021-06-23 NOTE — HISTORY OF PRESENT ILLNESS
[de-identified] : Mr. Rolf Robert is 60 year old male with IgG Kappa multiple myeloma transferred care from Dr. Amy Vo. \par \par Patient initially diagnosed with Multiple myeloma in 2018 and received 3 cycles of RVD but developed a severe skin reaction and was switched to Pomalyst/Pomalidomide.   \par After 2 months he was recommended stem cell transplant-> he was completely against transplant and was lost to follow up\par \par In Feb 2020, he developed low back pain radiating down to both hips. CT Pelvis done demonstrating severe joint space narrowing involving the right hip, moderate osteophytic changes on the left. Abnormal appearance of osseous structures of the pelvis with multiple lytic foci throughout the pelvis with a permeative appearance of the cortex of bilateral iliac bones. Mild compression deformity at L4 age indeterminant noted. \par B/l hips pain persisted with 3/2020 X-ray of pelvis noted multifocal lytic lesions throughout the bone.  PT was then by neurosurgery on 3/30/20 who recommended rad onc with MRI on 3/31/20 noted moderate compression fracture deformity involving C7 vertebral body with retropulsion of bone and mild bony central canal stenosis without cord compression or epidural extension. A completely collapsed T2 vertebral body with mild retropulsion of bone into spinal canal with moderate central canal stenosis with indentation of the ventral cord. No significant epidural tumor extension present and no edema noted within the adjacent spinal cord. Moderate pathologic compression fracture of T6 without retropulsion of bone/cord compression. Mild compression fracture of T7 without retropulsion of bone. Mild of T8 with mild retropulsion of bone and mild central canal stenosis. Moderate pathologic compression fracture of T10 without retropulsion/compression. Minimal pathologic compression of inferior endplate of T11. Mild pathologic compression involving T12 and L3. Moderate pathologic compression fracture involving L4 stable compared to prior CT. Mild retropulsion of bone present.\par \par Patient had bone marrow biopsy performed among other MM tests which demonstrated plasma cell neoplasm > 70%. \par Hyperdiploidy and gains of chromosomes 3, 5, 9, 11, 15 and 19, as well as, rearrangements of 1p  are recurrently seen in plasma cell disorders.  Complex karyotypic changes are generally associated with an unfavorable clinical course\par FISH myeloma-  Three copies of CCND1 detected (15.5%)\par \par 4/2/20 skeletal survey demonstrated scattered lytic lesions in the calvarium, spine, and pelvis without fracture or dislocation. Multilevel spondylosis and vertebral wedge compression deformities noted.\par \par Patient was then seen by radiation oncologist Dr. Rica Villalpando on 4/24/20.\par \par \par 1/6/2021 MRI L spine\par Improvement in abnormal bone marrow signal consistent with multiple myeloma.\par Multiple pathologic compression fractures. No acute compression fracture. Worsening of L4\par compression fracture since previous MRI 3/31/2020 with increasing retropulsion of bone resulting in\par right lateral recess stenosis and right L4-L5 foramen stenosis. There is a fluid "cleft" in the L4\par vertebral body suggesting underlying avascular necrosis.\par Although the compression fractures are pathologic fractures consistent with underlying myeloma,\par there is no evidence of tumor extending through vertebral body cortex into the epidural space.\par \par Patient has skipped his treatment since 5/6/21 due to transportation issues.. His sister has not scheduled him for Pet/Ct scan. He still has persistent left abdominal pain, low back pain, and lately noticing he has been losing muscle around his shoulders with more curvature of upper thoracic and cervical spines. \par He stopped Amlodipine in the last few days due to running out of medication. \par  [de-identified] : He is seen today for Daratumumab # 25 W3 of Kyprolis\par Current treatment:  daratumumab kyprolis dexamethasone - (5/5/20 - present) \par \par Patient is clinically doing the same, still has intermittent low back pain and is unhappy that he's not gaining muscle, losing height, and the distension of his belly. \par He is relying on his sister to make all his medical along with transportation appointment, his Pet/CT scan has yet scheduled. \par \par Weight = 120->121-> 124 lbs -> 128 -> 126 lbs -->124 -> 127  ->134 lbs -> 131 lbs ->136 lbs -> 146 lbs ->147lbs -> 146 ->147lbs ->150lbs ->148lbs ->149lbs -> 144 lbs -> 147 -> 140lbs -> 147 -> 145 lbs -> 148lbs -> 143 lbs -> 144lbs -> 139 lbs -> 141lbs -> 149 lbs -> 143 lbs \par \par

## 2021-06-23 NOTE — RESULTS/DATA
[FreeTextEntry1] : Labs reviewed, analyzed and discussed\par 6/23/21 wbc 6.9 Hgb 14.0 hct 44.2 plts 264

## 2021-06-23 NOTE — ASSESSMENT
[FreeTextEntry1] : Relapsed IgG Kappa multiple myeloma \par Diagnosed 2018 s/p RVD x 3 cycles.\par Developed severe skin reaction to revlimid and was supposed to be switched ot pomalyst.\par However did not want stem cell transplant and decided to stop follow up\par Presented Feb 2020 with low back pain found to have diffuse bone metastases\par Bone marrow (3/20): > 70% clonal plasma cells\par Hyperdiploidy and gains of chromosomes 3, 5, 9, 11, 15 and 19, as well as, rearrangements of 1p  are recurrently seen in plasma cell disorders.  Complex karyotypic changes are generally associated with an unfavorable clinical course\par FISH myeloma-  Three copies of CCND1 detected (15.5%)\par #On daratuumab kyprolis dexamethasone ( 5/5/20-present) \par Daratumumab # 25  W2 of Kyprolis \par Labs reviewed and discussed\par Acyclovir 400mg bid \par He is now on daratumumab [id number] and kyprolis 3 weeks on 1 week off\par Patient is clinically doing much better since started treatment with weight gained and no longer needing walker\par -FLCR trended up to around 2 from 1. \par M spike - unable to quantitate\par -patient advised to have repeat bone marrow but he declined. \par -Patient to have repeat Pet/Ct due worsening low back pain, neck pain, and b/l abdominal pain - has yet made an appointment. \par \par #Loss of height\par Excessive abdominal fat\par Explained that loss of height is from myeloma and is likely going to be permanent\par Abdominal fat is related to his diet and body habitus\par Advised to establish with PCP- referral given\par \par #depression and fatigue\par -Started patient on Remeron which he has not been compliant on so advised to d/c. \par -encouraged to be more active and seek more social supports (therapy groups or even a part time job if he can).\par \par b/l lower ext edema \par Likely from kyprolis\par No sob. Lung exam normal\par Compression stockings and leg elevation advised\par \par #Low back pain - needs to get PET/Ct scan done\par Refer to IR for L4 kyphoplasty but patient continues  to refuse.\par Oxycodone 5 mg prn\par \par Bone metastases - \par pet/ct scan denied. bone scan as stated above - to have new bone scan which patient wants to reconsider\par Completed Radiation C6-T3 spine, completed 5/20/20\par continue with Oxycodone 5mg prn.\par Stressed to take Ca 600mg bid.\par Given 6/10/21\par \par #HTN - on Amlodipine 10 mg - advised to take daily and to follow up with Dr. Rodriguez. \par \par Follow up in 2 week for w1 of Kyprolis. Myeloma panel on the day he starts a new cycle \par CBC, CMP, \par \par Contact\par Charla-Raya Robert (sister) - [phone]\par \par

## 2021-06-23 NOTE — CONSULT LETTER
[Courtesy Letter:] : I had the pleasure of seeing your patient, [unfilled], in my office today. [FreeTextEntry3] : Aubrey Quinones MD, MPH\par Attending Physician\par Hematology Oncology\par [facility] [place]\par [facility]\par

## 2021-07-07 ENCOUNTER — RESULT REVIEW (OUTPATIENT)
Age: 61
End: 2021-07-07

## 2021-07-07 ENCOUNTER — APPOINTMENT (OUTPATIENT)
Dept: HEMATOLOGY ONCOLOGY | Facility: CLINIC | Age: 61
End: 2021-07-07
Payer: MEDICAID

## 2021-07-07 VITALS
WEIGHT: 144 LBS | HEIGHT: 66.93 IN | DIASTOLIC BLOOD PRESSURE: 90 MMHG | SYSTOLIC BLOOD PRESSURE: 131 MMHG | TEMPERATURE: 98.7 F | RESPIRATION RATE: 18 BRPM | OXYGEN SATURATION: 98 % | BODY MASS INDEX: 22.6 KG/M2 | HEART RATE: 97 BPM

## 2021-07-07 PROCEDURE — 99215 OFFICE O/P EST HI 40 MIN: CPT

## 2021-07-07 NOTE — ASSESSMENT
[FreeTextEntry1] : Relapsed IgG Kappa multiple myeloma \par Diagnosed 2018 s/p RVD x 3 cycles.\par Developed severe skin reaction to revlimid and was supposed to be switched ot pomalyst.\par However did not want stem cell transplant and decided to stop follow up\par Presented Feb 2020 with low back pain found to have diffuse bone metastases\par Bone marrow (3/20): > 70% clonal plasma cells\par Hyperdiploidy and gains of chromosomes 3, 5, 9, 11, 15 and 19, as well as, rearrangements of 1p  are recurrently seen in plasma cell disorders.  Complex karyotypic changes are generally associated with an unfavorable clinical course\par FISH myeloma-  Three copies of CCND1 detected (15.5%)\par #On daratuumab kyprolis dexamethasone ( 5/5/20-present) \par Daratumumab # 26 W1 of Kyprolis \par Labs reviewed and discussed\par Acyclovir 400mg bid \par He is now on daratumumab [id number] and kyprolis 3 weeks on 1 week off\par Patient is clinically doing much better since started treatment with weight gained and no longer needing walker\par -FLCR trended up to around 2 from 1. \par M spike - unable to quantitate\par PET/CT (7/21) due worsening low back pain, neck pain, and b/l abdominal pain - No acute findings\par \par #Loss of height\par Excessive abdominal fat\par Explained that loss of height is from myeloma and is likely going to be permanent\par Abdominal fat is related to his diet and body habitus\par Advised to establish with PCP- referral given\par \par #depression and fatigue\par -Started patient on Remeron which he has not been compliant on so advised to d/c. \par -encouraged to be more active and seek more social supports (therapy groups or even a part time job if he can).\par \par b/l lower ext edema \par Likely from kyprolis\par No sob. Lung exam normal\par Compression stockings and leg elevation advised\par \par #Low back pain - needs to get PET/Ct scan done\par Refer to IR for L4 kyphoplasty but patient continues  to refuse.\par Oxycodone 5 mg prn\par \par Bone metastases - \par pet/ct scan denied. bone scan as stated above - to have new bone scan which patient wants to reconsider\par Completed Radiation C6-T3 spine, completed 5/20/20\par continue with Oxycodone 5mg prn.\par Stressed to take Ca 600mg bid.\par Given 6/10/21\par \par #HTN - on Amlodipine 10 mg - advised to take daily and to follow up with Dr. Rodriguez. \par \par He will have treatment 3 weeks on 1 week off\par WIll follow up only on Day 1 of each cycle\par Follow up in 4 weeks for C27W1 DKd\par CBC, CMP, myeloma panel\par \par Contact\par Nadia Robert (sister) - [phone]\par \par

## 2021-07-07 NOTE — CONSULT LETTER
[Courtesy Letter:] : I had the pleasure of seeing your patient, [unfilled], in my office today. [FreeTextEntry3] : Aubrey Quinones MD, MPH\par Attending Physician\par Hematology Oncology\par [facility] [place]\par [facility]\par

## 2021-07-07 NOTE — HISTORY OF PRESENT ILLNESS
[de-identified] : Mr. Rolf Robert is 60 year old male with IgG Kappa multiple myeloma transferred care from Dr. Amy Vo. \par \par Patient initially diagnosed with Multiple myeloma in 2018 and received 3 cycles of RVD but developed a severe skin reaction and was switched to Pomalyst/Pomalidomide.   \par After 2 months he was recommended stem cell transplant-> he was completely against transplant and was lost to follow up\par \par In Feb 2020, he developed low back pain radiating down to both hips. CT Pelvis done demonstrating severe joint space narrowing involving the right hip, moderate osteophytic changes on the left. Abnormal appearance of osseous structures of the pelvis with multiple lytic foci throughout the pelvis with a permeative appearance of the cortex of bilateral iliac bones. Mild compression deformity at L4 age indeterminant noted. \par B/l hips pain persisted with 3/2020 X-ray of pelvis noted multifocal lytic lesions throughout the bone.  PT was then by neurosurgery on 3/30/20 who recommended rad onc with MRI on 3/31/20 noted moderate compression fracture deformity involving C7 vertebral body with retropulsion of bone and mild bony central canal stenosis without cord compression or epidural extension. A completely collapsed T2 vertebral body with mild retropulsion of bone into spinal canal with moderate central canal stenosis with indentation of the ventral cord. No significant epidural tumor extension present and no edema noted within the adjacent spinal cord. Moderate pathologic compression fracture of T6 without retropulsion of bone/cord compression. Mild compression fracture of T7 without retropulsion of bone. Mild of T8 with mild retropulsion of bone and mild central canal stenosis. Moderate pathologic compression fracture of T10 without retropulsion/compression. Minimal pathologic compression of inferior endplate of T11. Mild pathologic compression involving T12 and L3. Moderate pathologic compression fracture involving L4 stable compared to prior CT. Mild retropulsion of bone present.\par \par Patient had bone marrow biopsy performed among other MM tests which demonstrated plasma cell neoplasm > 70%. \par Hyperdiploidy and gains of chromosomes 3, 5, 9, 11, 15 and 19, as well as, rearrangements of 1p  are recurrently seen in plasma cell disorders.  Complex karyotypic changes are generally associated with an unfavorable clinical course\par FISH myeloma-  Three copies of CCND1 detected (15.5%)\par \par 4/2/20 skeletal survey demonstrated scattered lytic lesions in the calvarium, spine, and pelvis without fracture or dislocation. Multilevel spondylosis and vertebral wedge compression deformities noted.\par \par Patient was then seen by radiation oncologist Dr. Rica Villalpando on 4/24/20.\par \par \par 1/6/2021 MRI L spine\par Improvement in abnormal bone marrow signal consistent with multiple myeloma.\par Multiple pathologic compression fractures. No acute compression fracture. Worsening of L4\par compression fracture since previous MRI 3/31/2020 with increasing retropulsion of bone resulting in\par right lateral recess stenosis and right L4-L5 foramen stenosis. There is a fluid "cleft" in the L4\par vertebral body suggesting underlying avascular necrosis.\par Although the compression fractures are pathologic fractures consistent with underlying myeloma,\par there is no evidence of tumor extending through vertebral body cortex into the epidural space.\par \par Patient has skipped his treatment since 5/6/21 due to transportation issues.. His sister has not scheduled him for Pet/Ct scan. He still has persistent left abdominal pain, low back pain, and lately noticing he has been losing muscle around his shoulders with more curvature of upper thoracic and cervical spines. \par He stopped Amlodipine in the last few days due to running out of medication. \par  [de-identified] : He is seen today for Daratumumab # 26 W1 of DKdKyprolis\par Current treatment:  daratumumab kyprolis dexamethasone - (5/5/20 - present) \par \par He feels good \par Continues to have similar complaints\par 1) His weight keeps getting distributed around his belly -> advised to exercise\par 2) He has lost height -> Explained it is from chronic fractures and there is likely nothing that can be done about this. Offered to refer to ortho and physical therapy. he declines\par 3) "He is loosing his veins from chemo" - Advised to continue with the regimen given his excellent response thus far. Discussed PICC vs chemoport. He declines\par \par He read about Flax seeds as a cure for multiple myeloma. Explained that MM is not a curable disease for now. He can have flax seed as an adjunct to his ongoing treatment but should not stop the treatment and switch to natural options. \par \par PET/CT (7/21)\par Reviewed and discussed, Has chronic bone lesions, no acute findings\par \par Weight = 120->121-> 124 lbs -> 128 -> 126 lbs -->124 -> 127  ->134 lbs -> 131 lbs ->136 lbs -> 146 lbs ->147lbs -> 146 ->147lbs ->150lbs ->148lbs ->149lbs -> 144 lbs -> 147 -> 140lbs -> 147 -> 145 lbs -> 148lbs -> 143 lbs -> 144lbs -> 139 lbs -> 141lbs -> 149 lbs -> 143 lbs -> 144 lbs\par \par

## 2021-08-04 ENCOUNTER — RESULT REVIEW (OUTPATIENT)
Age: 61
End: 2021-08-04

## 2021-08-04 ENCOUNTER — APPOINTMENT (OUTPATIENT)
Dept: HEMATOLOGY ONCOLOGY | Facility: CLINIC | Age: 61
End: 2021-08-04
Payer: MEDICAID

## 2021-08-04 VITALS
BODY MASS INDEX: 22.44 KG/M2 | RESPIRATION RATE: 18 BRPM | HEIGHT: 66.93 IN | SYSTOLIC BLOOD PRESSURE: 142 MMHG | WEIGHT: 143 LBS | OXYGEN SATURATION: 97 % | DIASTOLIC BLOOD PRESSURE: 97 MMHG | TEMPERATURE: 97 F | HEART RATE: 97 BPM

## 2021-08-04 PROCEDURE — 99214 OFFICE O/P EST MOD 30 MIN: CPT

## 2021-08-04 RX ORDER — MIRTAZAPINE 15 MG/1
15 TABLET, FILM COATED ORAL
Qty: 30 | Refills: 1 | Status: DISCONTINUED | COMMUNITY
Start: 2021-04-01 | End: 2021-08-04

## 2021-08-04 NOTE — RESULTS/DATA
[FreeTextEntry1] : Labs reviewed, analyzed and discussed\par 8/4/21 wbc 6.5 Hgb 13.5 hct 42.0 plts 427

## 2021-08-04 NOTE — ASSESSMENT
[FreeTextEntry1] : Relapsed IgG Kappa multiple myeloma \par Diagnosed 2018 s/p RVD x 3 cycles.\par Developed severe skin reaction to revlimid and was supposed to be switched ot pomalyst.\par However did not want stem cell transplant and decided to stop follow up\par Presented Feb 2020 with low back pain found to have diffuse bone metastases\par Bone marrow (3/20): > 70% clonal plasma cells\par Hyperdiploidy and gains of chromosomes 3, 5, 9, 11, 15 and 19, as well as, rearrangements of 1p  are recurrently seen in plasma cell disorders.  Complex karyotypic changes are generally associated with an unfavorable clinical course\par FISH myeloma-  Three copies of CCND1 detected (15.5%)\par #On daratuumab kyprolis dexamethasone ( 5/5/20-present) \par Daratumumab # 27 W1 of Kyprolis \par Labs reviewed and discussed\par Acyclovir 400mg bid \par He is now on daratumumab [id number] and kyprolis 3 weeks on 1 week off\par Patient is clinically doing much better since started treatment with weight gained and no longer needing walker\par -FLCR trended up to around 2 from 1. \par M spike - unable to quantitate\par No monoclonal band identified in July 2021. \par PET/CT (7/21) due worsening low back pain, neck pain, and b/l abdominal pain - No acute findings\par interrupted treatments in [place] due to tranporstation issue. \par Extensive detailed conversation regarding his MM and treatment plan \par \par #Loss of height\par Excessive abdominal fat\par Explained that loss of height is from myeloma and is likely going to be permanent\par Abdominal fat is related to his diet and body habitus\par Advised to establish with PCP- referral given - sister has yet made appointment for patient. \par \par #depression and fatigue\par -Started patient on Remeron which he has not been compliant on so advised to d/c. \par -encouraged to be more active and seek more social supports (therapy groups or even a part time job if he can).\par \par b/l lower ext edema \par Likely from kyprolis\par No sob. Lung exam normal\par Compression stockings and leg elevation advised\par \par #Low back pain - needs to get PET/Ct scan done\par Refer to IR for L4 kyphoplasty but patient continues  to refuse.\par Oxycodone 5 mg prn\par \par Bone metastases - \par pet/ct scan denied. bone scan as stated above - to have new bone scan which patient wants to reconsider\par Completed Radiation C6-T3 spine, completed 5/20/20\par continue with Oxycodone 5mg prn.\par Stressed to take Ca 600mg bid.\par Given 8/4/21\par \par #HTN - on Amlodipine 10 mg - advised to take daily and to follow up with Dr. Rodriguez. \par \par He will have treatment 3 weeks on 1 week off\par WIll follow up only on Day 1 of each cycle\par Follow up in 4 weeks for C28W1 DKd\par CBC, CMP, myeloma panel\par \par Contact\par Nadia Robert (sister) - [phone]\par \par

## 2021-08-04 NOTE — HISTORY OF PRESENT ILLNESS
[de-identified] : Mr. Rolf Robert is 60 year old male with IgG Kappa multiple myeloma transferred care from Dr. Amy Vo. \par \par Patient initially diagnosed with Multiple myeloma in 2018 and received 3 cycles of RVD but developed a severe skin reaction and was switched to Pomalyst/Pomalidomide.   \par After 2 months he was recommended stem cell transplant-> he was completely against transplant and was lost to follow up\par \par In Feb 2020, he developed low back pain radiating down to both hips. CT Pelvis done demonstrating severe joint space narrowing involving the right hip, moderate osteophytic changes on the left. Abnormal appearance of osseous structures of the pelvis with multiple lytic foci throughout the pelvis with a permeative appearance of the cortex of bilateral iliac bones. Mild compression deformity at L4 age indeterminant noted. \par B/l hips pain persisted with 3/2020 X-ray of pelvis noted multifocal lytic lesions throughout the bone.  PT was then by neurosurgery on 3/30/20 who recommended rad onc with MRI on 3/31/20 noted moderate compression fracture deformity involving C7 vertebral body with retropulsion of bone and mild bony central canal stenosis without cord compression or epidural extension. A completely collapsed T2 vertebral body with mild retropulsion of bone into spinal canal with moderate central canal stenosis with indentation of the ventral cord. No significant epidural tumor extension present and no edema noted within the adjacent spinal cord. Moderate pathologic compression fracture of T6 without retropulsion of bone/cord compression. Mild compression fracture of T7 without retropulsion of bone. Mild of T8 with mild retropulsion of bone and mild central canal stenosis. Moderate pathologic compression fracture of T10 without retropulsion/compression. Minimal pathologic compression of inferior endplate of T11. Mild pathologic compression involving T12 and L3. Moderate pathologic compression fracture involving L4 stable compared to prior CT. Mild retropulsion of bone present.\par \par Patient had bone marrow biopsy performed among other MM tests which demonstrated plasma cell neoplasm > 70%. \par Hyperdiploidy and gains of chromosomes 3, 5, 9, 11, 15 and 19, as well as, rearrangements of 1p  are recurrently seen in plasma cell disorders.  Complex karyotypic changes are generally associated with an unfavorable clinical course\par FISH myeloma-  Three copies of CCND1 detected (15.5%)\par \par 4/2/20 skeletal survey demonstrated scattered lytic lesions in the calvarium, spine, and pelvis without fracture or dislocation. Multilevel spondylosis and vertebral wedge compression deformities noted.\par \par Patient was then seen by radiation oncologist Dr. Rica Villalpando on 4/24/20.\par \par \par 1/6/2021 MRI L spine\par Improvement in abnormal bone marrow signal consistent with multiple myeloma.\par Multiple pathologic compression fractures. No acute compression fracture. Worsening of L4\par compression fracture since previous MRI 3/31/2020 with increasing retropulsion of bone resulting in\par right lateral recess stenosis and right L4-L5 foramen stenosis. There is a fluid "cleft" in the L4\par vertebral body suggesting underlying avascular necrosis.\par Although the compression fractures are pathologic fractures consistent with underlying myeloma,\par there is no evidence of tumor extending through vertebral body cortex into the epidural space.\par \par Patient has skipped his treatment since 5/6/21 due to transportation issues.. His sister has not scheduled him for Pet/Ct scan. He still has persistent left abdominal pain, low back pain, and lately noticing he has been losing muscle around his shoulders with more curvature of upper thoracic and cervical spines. \par He stopped Amlodipine in the last few days due to running out of medication. \par  [de-identified] : He is seen today for Daratumumab # 27 W1 of DKdKyprolis\par Current treatment:  daratumumab kyprolis dexamethasone - (5/5/20 - present) \par \par He skipped two weeks treatment due to transportation issues.\par Still upset that he's not bulking up, continues to have chronic low back pain, and difficulty with peripheral access for chemo treatment but refusing PICC lines or mediport. \par He's is concerning  about his vitamin, cholesterol, and wellness status but sister has yet scheduled for him to establish care with a PCP. \par \par PET/CT (7/21)\par Reviewed and discussed, Has chronic bone lesions, no acute findings\par \par Weight = 120->121-> 124 lbs -> 128 -> 126 lbs -->124 -> 127  ->134 lbs -> 131 lbs ->136 lbs -> 146 lbs ->147lbs -> 146 ->147lbs ->150lbs ->148lbs ->149lbs -> 144 lbs -> 147 -> 140lbs -> 147 -> 145 lbs -> 148lbs -> 143 lbs -> 144lbs -> 139 lbs -> 141lbs -> 149 lbs -> 143 lbs -> 144 lbs\par \par

## 2021-09-01 ENCOUNTER — APPOINTMENT (OUTPATIENT)
Dept: HEMATOLOGY ONCOLOGY | Facility: CLINIC | Age: 61
End: 2021-09-01
Payer: MEDICAID

## 2021-09-01 ENCOUNTER — RESULT REVIEW (OUTPATIENT)
Age: 61
End: 2021-09-01

## 2021-09-01 ENCOUNTER — APPOINTMENT (OUTPATIENT)
Dept: HEMATOLOGY ONCOLOGY | Facility: CLINIC | Age: 61
End: 2021-09-01

## 2021-09-01 VITALS
HEART RATE: 89 BPM | RESPIRATION RATE: 16 BRPM | HEIGHT: 66.93 IN | OXYGEN SATURATION: 100 % | TEMPERATURE: 98 F | DIASTOLIC BLOOD PRESSURE: 90 MMHG | SYSTOLIC BLOOD PRESSURE: 146 MMHG | BODY MASS INDEX: 22.93 KG/M2 | WEIGHT: 146.1 LBS

## 2021-09-01 PROCEDURE — 99215 OFFICE O/P EST HI 40 MIN: CPT

## 2021-09-01 NOTE — HISTORY OF PRESENT ILLNESS
[de-identified] : Mr. Rolf Robert is 60 year old male with IgG Kappa multiple myeloma transferred care from Dr. Amy Vo. \par \par Patient initially diagnosed with Multiple myeloma in 2018 and received 3 cycles of RVD but developed a severe skin reaction and was switched to Pomalyst/Pomalidomide.   \par After 2 months he was recommended stem cell transplant-> he was completely against transplant and was lost to follow up\par \par In Feb 2020, he developed low back pain radiating down to both hips. CT Pelvis done demonstrating severe joint space narrowing involving the right hip, moderate osteophytic changes on the left. Abnormal appearance of osseous structures of the pelvis with multiple lytic foci throughout the pelvis with a permeative appearance of the cortex of bilateral iliac bones. Mild compression deformity at L4 age indeterminant noted. \par B/l hips pain persisted with 3/2020 X-ray of pelvis noted multifocal lytic lesions throughout the bone.  PT was then by neurosurgery on 3/30/20 who recommended rad onc with MRI on 3/31/20 noted moderate compression fracture deformity involving C7 vertebral body with retropulsion of bone and mild bony central canal stenosis without cord compression or epidural extension. A completely collapsed T2 vertebral body with mild retropulsion of bone into spinal canal with moderate central canal stenosis with indentation of the ventral cord. No significant epidural tumor extension present and no edema noted within the adjacent spinal cord. Moderate pathologic compression fracture of T6 without retropulsion of bone/cord compression. Mild compression fracture of T7 without retropulsion of bone. Mild of T8 with mild retropulsion of bone and mild central canal stenosis. Moderate pathologic compression fracture of T10 without retropulsion/compression. Minimal pathologic compression of inferior endplate of T11. Mild pathologic compression involving T12 and L3. Moderate pathologic compression fracture involving L4 stable compared to prior CT. Mild retropulsion of bone present.\par \par Patient had bone marrow biopsy performed among other MM tests which demonstrated plasma cell neoplasm > 70%. \par Hyperdiploidy and gains of chromosomes 3, 5, 9, 11, 15 and 19, as well as, rearrangements of 1p  are recurrently seen in plasma cell disorders.  Complex karyotypic changes are generally associated with an unfavorable clinical course\par FISH myeloma-  Three copies of CCND1 detected (15.5%)\par \par 4/2/20 skeletal survey demonstrated scattered lytic lesions in the calvarium, spine, and pelvis without fracture or dislocation. Multilevel spondylosis and vertebral wedge compression deformities noted.\par \par Patient was then seen by radiation oncologist Dr. Rica Villalpando on 4/24/20.\par \par \par 1/6/2021 MRI L spine\par Improvement in abnormal bone marrow signal consistent with multiple myeloma.\par Multiple pathologic compression fractures. No acute compression fracture. Worsening of L4\par compression fracture since previous MRI 3/31/2020 with increasing retropulsion of bone resulting in\par right lateral recess stenosis and right L4-L5 foramen stenosis. There is a fluid "cleft" in the L4\par vertebral body suggesting underlying avascular necrosis.\par Although the compression fractures are pathologic fractures consistent with underlying myeloma,\par there is no evidence of tumor extending through vertebral body cortex into the epidural space.\par \par Patient has skipped his treatment since 5/6/21 due to transportation issues.. His sister has not scheduled him for Pet/Ct scan. He still has persistent left abdominal pain, low back pain, and lately noticing he has been losing muscle around his shoulders with more curvature of upper thoracic and cervical spines. \par He stopped Amlodipine in the last few days due to running out of medication. \par \par PET/CT (7/21)\par Reviewed and discussed, Has chronic bone lesions, no acute findings\par  [de-identified] : He is seen today for Daratumumab # 28 W1 of DKd\par Current treatment:  daratumumab kyprolis dexamethasone - (5/5/20 - present) \par \par He continues to have similar complaints - not bulking up, chronic low back pain, and difficulty with peripheral access for chemo treatment\par Continues to refuse PICC lines or mediport.\par Does not want to change chemo due to the amazing overall response\par  \par \par Weight = 120->121-> 124 lbs -> 128 -> 126 lbs -->124 -> 127  ->134 lbs -> 131 lbs ->136 lbs -> 146 lbs ->147lbs -> 146 ->147lbs ->150lbs ->148lbs ->149lbs -> 144 lbs -> 147 -> 140lbs -> 147 -> 145 lbs -> 148lbs -> 143 lbs -> 144lbs -> 139 lbs -> 141lbs -> 149 lbs -> 143 lbs -> 144 lbs\par \par

## 2021-09-01 NOTE — ASSESSMENT
[FreeTextEntry1] : Relapsed IgG Kappa multiple myeloma \par Diagnosed 2018 s/p RVD x 3 cycles.\par Developed severe skin reaction to revlimid and was supposed to be switched ot pomalyst.\par However did not want stem cell transplant and decided to stop follow up\par Presented Feb 2020 with low back pain found to have diffuse bone metastases\par Bone marrow (3/20): > 70% clonal plasma cells\par Hyperdiploidy and gains of chromosomes 3, 5, 9, 11, 15 and 19, as well as, rearrangements of 1p  are recurrently seen in plasma cell disorders.  Complex karyotypic changes are generally associated with an unfavorable clinical course\par FISH myeloma-  Three copies of CCND1 detected (15.5%)\par #On daratuumab kyprolis dexamethasone ( 5/5/20-present) \par CR since July 2021\par \par Clinically he continues to have similar questions\par He is aware of the answers but declines recommendations such as chemoport placement, radiation for painful bone mets etc\par Daratumumab # 28W1 of DKd \par Labs reviewed and discussed\par Acyclovir 400mg bid \par He is now on daratumumab K2kargx and kyprolis 3 weeks on 1 week off\par Patient is clinically doing much better since started treatment with weight gained and no longer needing walker\par CR since July 2021. \par PET/CT (7/21) due worsening low back pain, neck pain, and b/l abdominal pain - No acute findings\par \par #Loss of height\par Excessive abdominal fat\par Explained that loss of height is from myeloma and is likely going to be permanent\par Abdominal fat is related to his diet and body habitus\par Advised to establish with PCP- referral given - sister has yet made appointment for patient. \par \par #depression and fatigue\par -Started patient on Remeron which he has not been compliant on so advised to d/c. \par -encouraged to be more active and seek more social supports (therapy groups or even a part time job if he can).\par \par b/l lower ext edema \par Likely from kyprolis\par No sob. Lung exam normal\par Compression stockings and leg elevation advised\par \par #Low back pain - needs to get PET/Ct scan done\par Refer to IR for L4 kyphoplasty but patient continues  to refuse.\par Oxycodone 5 mg prn\par \par Bone metastases - \par pet/ct scan denied. bone scan as stated above - to have new bone scan which patient wants to reconsider\par Completed Radiation C6-T3 spine, completed 5/20/20\par continue with Oxycodone 5mg prn.\par Stressed to take Ca 600mg bid.\par Given 9/1/21\par \par #HTN - on Amlodipine 10 mg - advised to take daily and to follow up with Dr. Rodriguez. \par \par He will have treatment 3 weeks on 1 week off\par WIll follow up only on Day 1 of each cycle\par Follow up in 4 weeks for C29W1 DKd\par CBC, CMP, myeloma panel\par \par Contact\par Nadia Robert (sister) - [phone]\par \par

## 2021-09-01 NOTE — CONSULT LETTER
[FreeTextEntry3] : Aubrey Quinones MD, MPH\par Attending Physician\par Hematology Oncology\par [facility] [place]\par [facility]\par

## 2021-09-15 ENCOUNTER — NON-APPOINTMENT (OUTPATIENT)
Age: 61
End: 2021-09-15

## 2021-09-23 NOTE — ED PROVIDER NOTE - NO SIGNIFICANT PAST SURGICAL HISTORY
Assessment/Plan:    A-fib [facility])  Patient to continue rate control with diltiazem and also the eliquis Patient to see cardiology    Benign essential hypertension  Blood pressure is stable on medication Patient to continue current medications as directed I will see her in 6 months    Hashimoto's thyroiditis  US is due in spring 2022 Patient continue currnet care and see me in 6 months    History of colon cancer  Colonoscopy was in 2018 Patient will contact surgeon regarding followup    History of lung cancer  Continue specialist followup    Mixed hyperlipidemia  Lipids are at goal Continue medication and followup as scheduled in 6 months    Osteopenia of multiple sites  Continue with calcium and vitamin D     Overweight  Weight is stable discussed diet and exercise Followup in 6 months    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  Patient to have mammogram as scheduled and see Dr Margaux Gonzalez for folllowup    Postoperative hypothyroidism  TSH is stable continue meds    Thyroid nodule  Biopsy was negative for cancer Repeat US in Spring 2022 as scheduled       Diagnoses and all orders for this visit:    Benign essential hypertension    Permanent atrial fibrillation ([address] )    Hashimoto's thyroiditis    Mixed hyperlipidemia    Osteopenia of multiple sites    Overweight    Postoperative hypothyroidism    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    History of colon cancer    History of lung cancer    Thyroid nodule    Other orders  -     cyanocobalamin (VITAMIN B-12) 1000 MCG tablet; Take 1,000 mcg by mouth          Subjective:   Chief Complaint   Patient presents with    Hypothyroidism    Hypertension    Hyperlipidemia          Patient ID: Jose Garcia is a 80 y o  female      Patient is here for followup of atrial fibrillation hypertension hyperlipidemia post surgical hypothyroidism with thyroid nodule on left GERD her weight and also her history of ductal carcinoma of the breast ,history of colon cancer in 1977 and also her history of lung cancer Patient has multiple specialty doctors and is up to date with those visits She had mammogram and followup for her breast in April  She has followup US of thyroid scheduled for 3/21/2022 as she had a thyroid nodule biopsy this year that was negative Patient sees Dr Aki Deng for her atrial fibrillation and was there in last one month She will be having ECHO done Patient is maintained on blood thinner for stroke prophylaxis and is rate controlled Patient last labs showed lipids and thyroid at goal Patient GERD is stable she had EGD in 2016 with biopsy She is due for colonosopy Patient followup regarding her lung cancer history was in spring also and things are good Patinet is active she is exercising Her weight is stable Patient has no complaints today       The following portions of the patient's history were reviewed and updated as appropriate: allergies, current medications, past family history, past medical history, past social history, past surgical history and problem list     Review of Systems   Constitutional: Negative for fatigue, fever and unexpected weight change  HENT: Negative for congestion, sinus pain and trouble swallowing  Eyes: Negative for discharge and visual disturbance  Respiratory: Negative for cough, chest tightness, shortness of breath and wheezing  Cardiovascular: Negative for chest pain, palpitations and leg swelling  Gastrointestinal: Negative for abdominal pain, blood in stool, constipation, diarrhea, nausea and vomiting  Genitourinary: Negative for difficulty urinating, dysuria, frequency and hematuria  Musculoskeletal: Negative for arthralgias, gait problem and joint swelling  Skin: Negative for rash and wound  Allergic/Immunologic: Negative for environmental allergies and food allergies  Neurological: Negative for dizziness, syncope, weakness, numbness and headaches  Hematological: Negative for adenopathy  Does not bruise/bleed easily  Psychiatric/Behavioral: Negative for confusion, decreased concentration and sleep disturbance  The patient is not nervous/anxious  Objective:      /68   Temp 97 9 °F (36 6 °C)   Ht 5' 4" (1 626 m)   Wt 74 4 kg (164 lb)   BMI 28 15 kg/m²          Physical Exam  Constitutional:       Appearance: Normal appearance  She is well-developed  HENT:      Head: Normocephalic and atraumatic  Right Ear: Hearing, tympanic membrane and external ear normal       Left Ear: Hearing, tympanic membrane and external ear normal    Eyes:      Extraocular Movements: Extraocular movements intact  Conjunctiva/sclera: Conjunctivae normal       Pupils: Pupils are equal, round, and reactive to light  Neck:      Thyroid: No thyromegaly  Cardiovascular:      Rate and Rhythm: Normal rate  Rhythm irregular  Pulses: Normal pulses  Heart sounds: Normal heart sounds  Pulmonary:      Effort: Pulmonary effort is normal       Breath sounds: Normal breath sounds  No wheezing or rales  Abdominal:      General: Bowel sounds are normal  There is no distension  Palpations: Abdomen is soft  Tenderness: There is no abdominal tenderness  Musculoskeletal:         General: No tenderness  Cervical back: Normal range of motion and neck supple  Lymphadenopathy:      Cervical: No cervical adenopathy  Skin:     General: Skin is warm and dry  Findings: No rash  Neurological:      General: No focal deficit present  Mental Status: She is alert and oriented to person, place, and time  Cranial Nerves: No cranial nerve deficit  Coordination: Coordination normal    Psychiatric:         Mood and Affect: Mood normal          Behavior: Behavior normal          Thought Content:  Thought content normal          Judgment: Judgment normal  <<----- Click to add NO significant Past Surgical History

## 2021-09-29 ENCOUNTER — APPOINTMENT (OUTPATIENT)
Dept: HEMATOLOGY ONCOLOGY | Facility: CLINIC | Age: 61
End: 2021-09-29

## 2021-10-20 ENCOUNTER — RESULT REVIEW (OUTPATIENT)
Age: 61
End: 2021-10-20

## 2021-10-20 ENCOUNTER — APPOINTMENT (OUTPATIENT)
Dept: HEMATOLOGY ONCOLOGY | Facility: CLINIC | Age: 61
End: 2021-10-20
Payer: MEDICAID

## 2021-10-20 VITALS
TEMPERATURE: 98.6 F | DIASTOLIC BLOOD PRESSURE: 108 MMHG | WEIGHT: 148.31 LBS | OXYGEN SATURATION: 99 % | HEIGHT: 66.93 IN | SYSTOLIC BLOOD PRESSURE: 153 MMHG | RESPIRATION RATE: 18 BRPM | HEART RATE: 95 BPM | BODY MASS INDEX: 23.28 KG/M2

## 2021-10-20 PROCEDURE — 99214 OFFICE O/P EST MOD 30 MIN: CPT | Mod: 25

## 2021-10-20 RX ORDER — OXYCODONE 5 MG/1
5 TABLET ORAL
Qty: 30 | Refills: 0 | Status: DISCONTINUED | COMMUNITY
End: 2021-10-20

## 2021-10-20 RX ORDER — TRAMADOL HYDROCHLORIDE 50 MG/1
50 TABLET, COATED ORAL
Qty: 45 | Refills: 0 | Status: DISCONTINUED | COMMUNITY
Start: 2021-06-16 | End: 2021-10-20

## 2021-10-20 RX ORDER — CALCIUM CARBONATE/VITAMIN D3 600 MG-20
600-800 TABLET ORAL
Qty: 60 | Refills: 3 | Status: ACTIVE | COMMUNITY
Start: 2020-10-22 | End: 1900-01-01

## 2021-10-20 NOTE — ASSESSMENT
[FreeTextEntry1] : Relapsed IgG Kappa multiple myeloma \par Diagnosed 2018 s/p RVD x 3 cycles.\par Developed severe skin reaction to revlimid and was supposed to be switched ot pomalyst.\par However did not want stem cell transplant and decided to stop follow up\par Presented Feb 2020 with low back pain found to have diffuse bone metastases\par Bone marrow (3/20): > 70% clonal plasma cells\par Hyperdiploidy and gains of chromosomes 3, 5, 9, 11, 15 and 19, as well as, rearrangements of 1p  are recurrently seen in plasma cell disorders.  Complex karyotypic changes are generally associated with an unfavorable clinical course\par FISH myeloma-  Three copies of CCND1 detected (15.5%)\par #On daratuumab kyprolis dexamethasone ( 5/5/20-present) \par CR since July 2021\par \par Clinically he continues to have similar questions\par He is aware of the answers but declines recommendations such as chemoport placement, radiation for painful bone mets etc\par Daratumumab # 29W1 of DKd \par Labs reviewed and discussed\par Acyclovir 400mg bid \par He is now on daratumumab [id number] and kyprolis 3 weeks on 1 week off\par Patient is clinically doing much better since started treatment with weight gained and no longer needing walker\par CR since July 2021. \par PET/CT (7/21) due worsening low back pain, neck pain, and b/l abdominal pain - No acute findings\par He has been off treatmetn periordically due to transportation issues. \par Advised to stick to schedule, a copy of his schedule also sent to his sister so she can make transportation arrangement. We also connect patient to be seen by our  Quin Phipps. \par \par #Loss of height\par Excessive abdominal fat\par Explained that loss of height is from myeloma and is likely going to be permanent\par Abdominal fat is related to his diet and body habitus\par Advised to establish with PCP- referral given - sister has yet made appointment for patient. \par \par #depression and fatigue\par -Started patient on Remeron which he has not been compliant on so advised to d/c. \par -encouraged to be more active and seek more social supports (therapy groups or even a part time job if he can).\par \par b/l lower ext edema  - resolved \par Likely from kyprolis\par No sob. Lung exam normal\par Compression stockings and leg elevation advised\par \par #Low back pain \par Refer to IR for L4 kyphoplasty but patient continues  to refuse.\par Oxycodone 5 mg prn\par \par Bone metastases - \par pet/ct scan denied. bone scan as stated above - to have new bone scan which patient wants to reconsider\par Completed Radiation C6-T3 spine, completed 5/20/20\par continue with Oxycodone 5mg prn.\par Stressed to take Ca 600mg bid.\par Given today 10/20/21\par \par #HTN - on Amlodipine 10 mg - advised to take daily and to follow up with Dr. Rodriguez. \par \par He will have treatment 3 weeks on 1 week off\par WIll follow up only on Day 1 of each cycle\par Follow up in 4 weeks for C29W1 DKd\par CBC, CMP, myeloma panel\par \par Contact\par Nadia Robert (sister) - [phone]\par \par

## 2021-10-20 NOTE — RESULTS/DATA
[FreeTextEntry1] : Labs reviewed, analyzed and discussed\par 10/20/21 wbc 6.3 Hgb 11.6 hct 36.6 plts 680

## 2021-10-20 NOTE — CONSULT LETTER
[Courtesy Letter:] : I had the pleasure of seeing your patient, [unfilled], in my office today. [FreeTextEntry3] : Aubrey Quinones MD, MPH\par Attending Physician\par Hematology Oncology\par [facility] [place]\par [facility]\par

## 2021-10-20 NOTE — HISTORY OF PRESENT ILLNESS
[de-identified] : Mr. Rolf Robert is 60 year old male with IgG Kappa multiple myeloma transferred care from Dr. Amy Vo. \par \par Patient initially diagnosed with Multiple myeloma in 2018 and received 3 cycles of RVD but developed a severe skin reaction and was switched to Pomalyst/Pomalidomide.   \par After 2 months he was recommended stem cell transplant-> he was completely against transplant and was lost to follow up\par \par In Feb 2020, he developed low back pain radiating down to both hips. CT Pelvis done demonstrating severe joint space narrowing involving the right hip, moderate osteophytic changes on the left. Abnormal appearance of osseous structures of the pelvis with multiple lytic foci throughout the pelvis with a permeative appearance of the cortex of bilateral iliac bones. Mild compression deformity at L4 age indeterminant noted. \par B/l hips pain persisted with 3/2020 X-ray of pelvis noted multifocal lytic lesions throughout the bone.  PT was then by neurosurgery on 3/30/20 who recommended rad onc with MRI on 3/31/20 noted moderate compression fracture deformity involving C7 vertebral body with retropulsion of bone and mild bony central canal stenosis without cord compression or epidural extension. A completely collapsed T2 vertebral body with mild retropulsion of bone into spinal canal with moderate central canal stenosis with indentation of the ventral cord. No significant epidural tumor extension present and no edema noted within the adjacent spinal cord. Moderate pathologic compression fracture of T6 without retropulsion of bone/cord compression. Mild compression fracture of T7 without retropulsion of bone. Mild of T8 with mild retropulsion of bone and mild central canal stenosis. Moderate pathologic compression fracture of T10 without retropulsion/compression. Minimal pathologic compression of inferior endplate of T11. Mild pathologic compression involving T12 and L3. Moderate pathologic compression fracture involving L4 stable compared to prior CT. Mild retropulsion of bone present.\par \par Patient had bone marrow biopsy performed among other MM tests which demonstrated plasma cell neoplasm > 70%. \par Hyperdiploidy and gains of chromosomes 3, 5, 9, 11, 15 and 19, as well as, rearrangements of 1p  are recurrently seen in plasma cell disorders.  Complex karyotypic changes are generally associated with an unfavorable clinical course\par FISH myeloma-  Three copies of CCND1 detected (15.5%)\par \par 4/2/20 skeletal survey demonstrated scattered lytic lesions in the calvarium, spine, and pelvis without fracture or dislocation. Multilevel spondylosis and vertebral wedge compression deformities noted.\par \par Patient was then seen by radiation oncologist Dr. Rica Villalpando on 4/24/20.\par \par \par 1/6/2021 MRI L spine\par Improvement in abnormal bone marrow signal consistent with multiple myeloma.\par Multiple pathologic compression fractures. No acute compression fracture. Worsening of L4\par compression fracture since previous MRI 3/31/2020 with increasing retropulsion of bone resulting in\par right lateral recess stenosis and right L4-L5 foramen stenosis. There is a fluid "cleft" in the L4\par vertebral body suggesting underlying avascular necrosis.\par Although the compression fractures are pathologic fractures consistent with underlying myeloma,\par there is no evidence of tumor extending through vertebral body cortex into the epidural space.\par \par Patient has skipped his treatment since 5/6/21 due to transportation issues.. His sister has not scheduled him for Pet/Ct scan. He still has persistent left abdominal pain, low back pain, and lately noticing he has been losing muscle around his shoulders with more curvature of upper thoracic and cervical spines. \par He stopped Amlodipine in the last few days due to running out of medication. \par \par PET/CT (7/21)\par Reviewed and discussed, Has chronic bone lesions, no acute findings\par  [de-identified] : He is seen today for Daratumumab # 29 W1 of DKd\par Current treatment:  daratumumab kyprolis dexamethasone - (5/5/20 - present) \par \par Patient has not been able to come since his last visit on 9/15/21 due to transportation issues. He has also stopped taking all PO medications due to running out medications.  He continues to have right abdominal and low back pain - taking Tylenol and Motrin as needed. \par He wants to switch over to oral treatment due to poor peripheral access, does not want mediport placement. \par \par Weight = 120->121-> 124 lbs -> 128 -> 126 lbs -->124 -> 127  ->134 lbs -> 131 lbs ->136 lbs -> 146 lbs ->147lbs -> 146 ->147lbs ->150lbs ->148lbs ->149lbs -> 144 lbs -> 147 -> 140lbs -> 147 -> 145 lbs -> 148lbs -> 143 lbs -> 144lbs -> 139 lbs -> 141lbs -> 149 lbs -> 143 lbs -> 144 lbs -> 148 lbs \par \par

## 2021-11-17 ENCOUNTER — RESULT REVIEW (OUTPATIENT)
Age: 61
End: 2021-11-17

## 2021-11-17 ENCOUNTER — APPOINTMENT (OUTPATIENT)
Dept: HEMATOLOGY ONCOLOGY | Facility: CLINIC | Age: 61
End: 2021-11-17
Payer: MEDICAID

## 2021-11-17 VITALS
RESPIRATION RATE: 18 BRPM | SYSTOLIC BLOOD PRESSURE: 140 MMHG | DIASTOLIC BLOOD PRESSURE: 90 MMHG | HEIGHT: 66.93 IN | WEIGHT: 153 LBS | BODY MASS INDEX: 24.01 KG/M2 | OXYGEN SATURATION: 98 % | TEMPERATURE: 98.7 F | HEART RATE: 110 BPM

## 2021-11-17 PROCEDURE — 99215 OFFICE O/P EST HI 40 MIN: CPT

## 2021-11-17 NOTE — CONSULT LETTER
[Courtesy Letter:] : I had the pleasure of seeing your patient, [unfilled], in my office today. [FreeTextEntry3] : Aubrey Quinones MD, MPH\par Attending Physician\par Hematology Oncology\par [facility] [place]\par [facility]\par

## 2021-11-17 NOTE — ASSESSMENT
[FreeTextEntry1] : Relapsed IgG Kappa multiple myeloma \par Diagnosed 2018 s/p RVD x 3 cycles.\par Developed severe skin reaction to revlimid and was supposed to be switched ot pomalyst.\par However did not want stem cell transplant and decided to stop follow up\par Presented Feb 2020 with low back pain found to have diffuse bone metastases\par Bone marrow (3/20): > 70% clonal plasma cells\par Hyperdiploidy and gains of chromosomes 3, 5, 9, 11, 15 and 19, as well as, rearrangements of 1p  are recurrently seen in plasma cell disorders.  Complex karyotypic changes are generally associated with an unfavorable clinical course\par FISH myeloma-  Three copies of CCND1 detected (15.5%)\par #On daratuumab kyprolis dexamethasone ( 5/5/20-present) \par Daratumumab # 30W1 of DKd \par He is aware of the answers but declines recommendations such as chemoport placement, radiation for painful bone mets etc\par Labs reviewed and discussed\par Acyclovir 400mg bid \par He is now on daratumumab [id number] and kyprolis 3 weeks on 1 week off\par Patient is clinically doing much better since started treatment with weight gained and no longer needing walker\par CR since July 2021. \par PET/CT (7/21) due worsening low back pain, neck pain, and b/l abdominal pain - No acute findings\par He has been off treatment periodically due to transportation issues. \par \par #b/l lower ext edema, HTN, and shortness of breath on exertion\par he's not compliant with amlodipine \par Stressed to follow up with PCP and/or cardiologist - he states he doesn’t make MD appointment or transportations - his sister Camille does it all for him. Reached out to sister again. \par \par #Loss of height\par Excessive abdominal fat\par Explained that loss of height is from myeloma and is likely going to be permanent\par Abdominal fat is related to his diet and body habitus\par Advised to establish with PCP- referral given - sister has yet made appointment for patient. \par \par #depression and fatigue\par -Started patient on Remeron which he has not been compliant on so advised to d/c. \par -encouraged to be more active and seek more social supports (therapy groups or even a part time job if he can).\par \par #Low back pain \par Refer to IR for L4 kyphoplasty but patient continues  to refuse.\par Oxycodone 5 mg prn\par \par Bone metastases - \par pet/ct scan denied. bone scan as stated above - to have new bone scan which patient wants to reconsider\par Completed Radiation C6-T3 spine, completed 5/20/20\par continue with Oxycodone 5mg prn.\par Stressed to take Ca 600mg bid.\par Given today 11/17/21\par \par #HTN - on Amlodipine 10 mg - advised to take daily and to follow up with Dr. Rodriguez. \par \par He will have treatment 3 weeks on 1 week off\par WIll follow up only on Day 1 of each cycle\par Follow up in 4 weeks for C29W1 DKd\par CBC, CMP, myeloma panel\par \par Contact\par CharlaMart Jakob (sister) - [phone]\par \par

## 2021-11-17 NOTE — HISTORY OF PRESENT ILLNESS
[de-identified] : Mr. Rolf Robert is 60 year old male with IgG Kappa multiple myeloma transferred care from Dr. Amy Vo. \par \par Patient initially diagnosed with Multiple myeloma in 2018 and received 3 cycles of RVD but developed a severe skin reaction and was switched to Pomalyst/Pomalidomide.   \par After 2 months he was recommended stem cell transplant-> he was completely against transplant and was lost to follow up\par \par In Feb 2020, he developed low back pain radiating down to both hips. CT Pelvis done demonstrating severe joint space narrowing involving the right hip, moderate osteophytic changes on the left. Abnormal appearance of osseous structures of the pelvis with multiple lytic foci throughout the pelvis with a permeative appearance of the cortex of bilateral iliac bones. Mild compression deformity at L4 age indeterminant noted. \par B/l hips pain persisted with 3/2020 X-ray of pelvis noted multifocal lytic lesions throughout the bone.  PT was then by neurosurgery on 3/30/20 who recommended rad onc with MRI on 3/31/20 noted moderate compression fracture deformity involving C7 vertebral body with retropulsion of bone and mild bony central canal stenosis without cord compression or epidural extension. A completely collapsed T2 vertebral body with mild retropulsion of bone into spinal canal with moderate central canal stenosis with indentation of the ventral cord. No significant epidural tumor extension present and no edema noted within the adjacent spinal cord. Moderate pathologic compression fracture of T6 without retropulsion of bone/cord compression. Mild compression fracture of T7 without retropulsion of bone. Mild of T8 with mild retropulsion of bone and mild central canal stenosis. Moderate pathologic compression fracture of T10 without retropulsion/compression. Minimal pathologic compression of inferior endplate of T11. Mild pathologic compression involving T12 and L3. Moderate pathologic compression fracture involving L4 stable compared to prior CT. Mild retropulsion of bone present.\par \par Patient had bone marrow biopsy performed among other MM tests which demonstrated plasma cell neoplasm > 70%. \par Hyperdiploidy and gains of chromosomes 3, 5, 9, 11, 15 and 19, as well as, rearrangements of 1p  are recurrently seen in plasma cell disorders.  Complex karyotypic changes are generally associated with an unfavorable clinical course\par FISH myeloma-  Three copies of CCND1 detected (15.5%)\par \par 4/2/20 skeletal survey demonstrated scattered lytic lesions in the calvarium, spine, and pelvis without fracture or dislocation. Multilevel spondylosis and vertebral wedge compression deformities noted.\par \par Patient was then seen by radiation oncologist Dr. Rica Villalpando on 4/24/20.\par \par \par 1/6/2021 MRI L spine\par Improvement in abnormal bone marrow signal consistent with multiple myeloma.\par Multiple pathologic compression fractures. No acute compression fracture. Worsening of L4\par compression fracture since previous MRI 3/31/2020 with increasing retropulsion of bone resulting in\par right lateral recess stenosis and right L4-L5 foramen stenosis. There is a fluid "cleft" in the L4\par vertebral body suggesting underlying avascular necrosis.\par Although the compression fractures are pathologic fractures consistent with underlying myeloma,\par there is no evidence of tumor extending through vertebral body cortex into the epidural space.\par \par Patient has skipped his treatment since 5/6/21 due to transportation issues.. His sister has not scheduled him for Pet/Ct scan. He still has persistent left abdominal pain, low back pain, and lately noticing he has been losing muscle around his shoulders with more curvature of upper thoracic and cervical spines. \par He stopped Amlodipine in the last few days due to running out of medication. \par \par PET/CT (7/21)\par Reviewed and discussed, Has chronic bone lesions, no acute findings\par  [de-identified] : He is seen today for Daratumumab # 30 W1 of DKd\par Current treatment:  daratumumab kyprolis dexamethasone - (5/5/20 - present) \par \par Patient reports of worsening of b/l lower ext edema and shortness of breath on exertion with abdominal distension, gained 5 lbs in the last month. \par Patient states his sister has yet scheduled for him to follow up with a PCP (Dr. Day) and barely answering his phone calls. He is unable to schedule for any office visits, his sister is the one that's arranging for his MD visit and transportation. \par Continues to have low back pain. \par Worries that his skin drying and being fatigue. \par Denies n/v, fever, headaches, dizziness, bleeding, SOB. \par 98% 02 sat \par \par Weight = 120->121-> 124 lbs -> 128 -> 126 lbs -->124 -> 127  ->134 lbs -> 131 lbs ->136 lbs -> 146 lbs ->147lbs -> 146 ->147lbs ->150lbs ->148lbs ->149lbs -> 144 lbs -> 147 -> 140lbs -> 147 -> 145 lbs -> 148lbs -> 143 lbs -> 144lbs -> 139 lbs -> 141lbs -> 149 lbs -> 143 lbs -> 144 lbs -> 148 lbs -> 153 lbs \par \par

## 2021-12-09 DIAGNOSIS — R60.0 LOCALIZED EDEMA: ICD-10-CM

## 2021-12-15 ENCOUNTER — APPOINTMENT (OUTPATIENT)
Dept: HEMATOLOGY ONCOLOGY | Facility: CLINIC | Age: 61
End: 2021-12-15

## 2022-01-12 ENCOUNTER — RESULT REVIEW (OUTPATIENT)
Age: 62
End: 2022-01-12

## 2022-01-12 ENCOUNTER — APPOINTMENT (OUTPATIENT)
Dept: HEMATOLOGY ONCOLOGY | Facility: CLINIC | Age: 62
End: 2022-01-12
Payer: MEDICAID

## 2022-01-12 VITALS
HEART RATE: 105 BPM | TEMPERATURE: 98.9 F | SYSTOLIC BLOOD PRESSURE: 132 MMHG | WEIGHT: 147.56 LBS | RESPIRATION RATE: 16 BRPM | DIASTOLIC BLOOD PRESSURE: 88 MMHG | BODY MASS INDEX: 23.16 KG/M2 | OXYGEN SATURATION: 96 % | HEIGHT: 66.93 IN

## 2022-01-12 PROCEDURE — 99214 OFFICE O/P EST MOD 30 MIN: CPT

## 2022-01-12 RX ORDER — VITAMIN B COMPLEX
2500 TABLET ORAL
Qty: 30 | Refills: 11 | Status: DISCONTINUED | COMMUNITY
Start: 2020-05-12 | End: 2022-01-12

## 2022-01-12 RX ORDER — AMLODIPINE BESYLATE 10 MG/1
10 TABLET ORAL DAILY
Qty: 30 | Refills: 3 | Status: DISCONTINUED | COMMUNITY
Start: 2020-11-27 | End: 2022-01-12

## 2022-01-12 NOTE — ASSESSMENT
[FreeTextEntry1] : Relapsed IgG Kappa multiple myeloma \par Diagnosed 2018 s/p RVD x 3 cycles.\par Developed severe skin reaction to revlimid and was supposed to be switched ot pomalyst.\par However did not want stem cell transplant and decided to stop follow up\par Presented Feb 2020 with low back pain found to have diffuse bone metastases\par Bone marrow (3/20): > 70% clonal plasma cells\par Hyperdiploidy and gains of chromosomes 3, 5, 9, 11, 15 and 19, as well as, rearrangements of 1p  are recurrently seen in plasma cell disorders.  Complex karyotypic changes are generally associated with an unfavorable clinical course\par FISH myeloma-  Three copies of CCND1 detected (15.5%)\par \par #On daratuumab kyprolis dexamethasone ( 5/5/20-present) \par Daratumumab # 31W1 of DKd \par He is aware of the answers but declines recommendations such as chemoport placement, radiation for painful bone mets etc\par Labs reviewed and discussed\par Acyclovir 400mg bid \par He is now on daratumumab [id number] and kyprolis 3 weeks on 1 week off\par Patient is clinically doing much better since started treatment with weight gained and no longer needing walker\par CR since July 2021. \par PET/CT (7/21) due worsening low back pain, neck pain, and b/l abdominal pain - No acute findings\par He has been off treatment periodically due to transportation issues - most recent skipping treatment from 11/17/21 - 1/12/22\par -Advised to stick to chemo treatment\par -patient wants PO chemo treatment but given his past history of medication compliant, we just don't think it's his best interest to start on PO medications that patient would not be able to comply on. \par \par #b/l lower ext edema, HTN, and shortness of breath on exertion\par he's not compliant with amlodipine \par seen by his local PCP who started him on Lisinopril \par \par #Loss of height\par Excessive abdominal fat\par Explained that loss of height is from myeloma and is likely going to be permanent\par Abdominal fat is related to his diet and body habitus\par Advised to establish with PCP- referral given - sister has yet made appointment for patient. \par \par #depression and fatigue\par -Started patient on Remeron which he has not been compliant on so advised to d/c. \par -encouraged to be more active and seek more social supports (therapy groups or even a part time job if he can).\par \par #Low back pain \par Refer to IR for L4 kyphoplasty but patient continues  to refuse.\par now on Tylenol only\par \par Bone metastases - \par pet/ct scan denied. bone scan as stated above - to have new bone scan which patient wants to reconsider\par Completed Radiation C6-T3 spine, completed 5/20/20\par continue with Oxycodone 5mg prn.\par Stressed to take Ca 600mg bid.\par Given today 11/17/21\par Will give with next week treatment 1/19/22\par \par #HTN - now on Lisinopril\par \par He will have treatment 3 weeks on 1 week off\par WIll follow up only on Day 1 of each cycle\par Follow up in 4 weeks for C29W1 DKd\par CBC, CMP, myeloma panel\par \par Contact\par CharlaRobertoRaya Robetr (sister) - [phone]\par \par

## 2022-01-12 NOTE — CONSULT LETTER
[Courtesy Letter:] : I had the pleasure of seeing your patient, [unfilled], in my office today. [FreeTextEntry3] : Aubrey Quinones MD, MPH\par Attending Physician\par Hematology Oncology\par [facility] [place]\par [facility]\par

## 2022-01-12 NOTE — HISTORY OF PRESENT ILLNESS
[de-identified] : Mr. Rolf Robert is 60 year old male with IgG Kappa multiple myeloma transferred care from Dr. Amy Vo. \par \par Patient initially diagnosed with Multiple myeloma in 2018 and received 3 cycles of RVD but developed a severe skin reaction and was switched to Pomalyst/Pomalidomide.   \par After 2 months he was recommended stem cell transplant-> he was completely against transplant and was lost to follow up\par \par In Feb 2020, he developed low back pain radiating down to both hips. CT Pelvis done demonstrating severe joint space narrowing involving the right hip, moderate osteophytic changes on the left. Abnormal appearance of osseous structures of the pelvis with multiple lytic foci throughout the pelvis with a permeative appearance of the cortex of bilateral iliac bones. Mild compression deformity at L4 age indeterminant noted. \par B/l hips pain persisted with 3/2020 X-ray of pelvis noted multifocal lytic lesions throughout the bone.  PT was then by neurosurgery on 3/30/20 who recommended rad onc with MRI on 3/31/20 noted moderate compression fracture deformity involving C7 vertebral body with retropulsion of bone and mild bony central canal stenosis without cord compression or epidural extension. A completely collapsed T2 vertebral body with mild retropulsion of bone into spinal canal with moderate central canal stenosis with indentation of the ventral cord. No significant epidural tumor extension present and no edema noted within the adjacent spinal cord. Moderate pathologic compression fracture of T6 without retropulsion of bone/cord compression. Mild compression fracture of T7 without retropulsion of bone. Mild of T8 with mild retropulsion of bone and mild central canal stenosis. Moderate pathologic compression fracture of T10 without retropulsion/compression. Minimal pathologic compression of inferior endplate of T11. Mild pathologic compression involving T12 and L3. Moderate pathologic compression fracture involving L4 stable compared to prior CT. Mild retropulsion of bone present.\par \par Patient had bone marrow biopsy performed among other MM tests which demonstrated plasma cell neoplasm > 70%. \par Hyperdiploidy and gains of chromosomes 3, 5, 9, 11, 15 and 19, as well as, rearrangements of 1p  are recurrently seen in plasma cell disorders.  Complex karyotypic changes are generally associated with an unfavorable clinical course\par FISH myeloma-  Three copies of CCND1 detected (15.5%)\par \par 4/2/20 skeletal survey demonstrated scattered lytic lesions in the calvarium, spine, and pelvis without fracture or dislocation. Multilevel spondylosis and vertebral wedge compression deformities noted.\par \par Patient was then seen by radiation oncologist Dr. Rica Villalpando on 4/24/20.\par \par \par 1/6/2021 MRI L spine\par Improvement in abnormal bone marrow signal consistent with multiple myeloma.\par Multiple pathologic compression fractures. No acute compression fracture. Worsening of L4\par compression fracture since previous MRI 3/31/2020 with increasing retropulsion of bone resulting in\par right lateral recess stenosis and right L4-L5 foramen stenosis. There is a fluid "cleft" in the L4\par vertebral body suggesting underlying avascular necrosis.\par Although the compression fractures are pathologic fractures consistent with underlying myeloma,\par there is no evidence of tumor extending through vertebral body cortex into the epidural space.\par \par Patient has skipped his treatment since 5/6/21 due to transportation issues.. His sister has not scheduled him for Pet/Ct scan. He still has persistent left abdominal pain, low back pain, and lately noticing he has been losing muscle around his shoulders with more curvature of upper thoracic and cervical spines. \par He stopped Amlodipine in the last few days due to running out of medication. \par \par PET/CT (7/21)\par Reviewed and discussed, Has chronic bone lesions, no acute findings\par  [de-identified] : He is seen today for Daratumumab # 31 W1 of DKd  - patient skipped treatment since 11/17/21 due to transportation issues\par Current treatment:  daratumumab kyprolis dexamethasone - (5/5/20 - present) \par \par Patient skipped treatment from 11/17/21 - 1/12/21 due to transportation issues. He is no longer living with his sister but she's still helping him arranging transportation for all his doctor's visits. \par He was seen by his PCP locally who has prescribed him Lisinopril.\par Intermittent low back pain stays the same, improved with Tylenol prn. \par Patient wants to stop all IV treatment, prefers PO tablets due to poor peripheral vieins. \par Denies n/v, fever, headaches, dizziness, bleeding, SOB. \par \par Weight = 120->121-> 124 lbs -> 128 -> 126 lbs -->124 -> 127  ->134 lbs -> 131 lbs ->136 lbs -> 146 lbs ->147lbs -> 146 ->147lbs ->150lbs ->148lbs ->149lbs -> 144 lbs -> 147 -> 140lbs -> 147 -> 145 lbs -> 148lbs -> 143 lbs -> 144lbs -> 139 lbs -> 141lbs -> 149 lbs -> 143 lbs -> 144 lbs -> 148 lbs -> 153 lbs  -> 147 lbs \par \par

## 2022-02-09 ENCOUNTER — RESULT REVIEW (OUTPATIENT)
Age: 62
End: 2022-02-09

## 2022-02-09 ENCOUNTER — APPOINTMENT (OUTPATIENT)
Dept: HEMATOLOGY ONCOLOGY | Facility: CLINIC | Age: 62
End: 2022-02-09
Payer: MEDICAID

## 2022-02-09 VITALS
HEART RATE: 100 BPM | BODY MASS INDEX: 23.07 KG/M2 | TEMPERATURE: 99.3 F | OXYGEN SATURATION: 97 % | RESPIRATION RATE: 18 BRPM | SYSTOLIC BLOOD PRESSURE: 151 MMHG | WEIGHT: 147 LBS | HEIGHT: 66.93 IN | DIASTOLIC BLOOD PRESSURE: 99 MMHG

## 2022-02-09 DIAGNOSIS — C79.51 NEOPLASM RELATED PAIN (ACUTE) (CHRONIC): ICD-10-CM

## 2022-02-09 DIAGNOSIS — G89.3 NEOPLASM RELATED PAIN (ACUTE) (CHRONIC): ICD-10-CM

## 2022-02-09 PROCEDURE — 99215 OFFICE O/P EST HI 40 MIN: CPT

## 2022-02-09 NOTE — REASON FOR VISIT
[Follow-Up Visit] : a follow-up visit for [FreeTextEntry2] : Multiple Myeloma DISPLAY PLAN FREE TEXT DISPLAY PLAN FREE TEXT DISPLAY PLAN FREE TEXT DISPLAY PLAN FREE TEXT DISPLAY PLAN FREE TEXT DISPLAY PLAN FREE TEXT DISPLAY PLAN FREE TEXT DISPLAY PLAN FREE TEXT DISPLAY PLAN FREE TEXT DISPLAY PLAN FREE TEXT

## 2022-02-09 NOTE — ASSESSMENT
[FreeTextEntry1] : Relapsed IgG Kappa multiple myeloma \par Diagnosed 2018 s/p RVD x 3 cycles.\par Developed severe skin reaction to revlimid and was supposed to be switched ot pomalyst.\par However did not want stem cell transplant and decided to stop follow up\par Presented Feb 2020 with low back pain found to have diffuse bone metastases\par Bone marrow (3/20): > 70% clonal plasma cells\par Hyperdiploidy and gains of chromosomes 3, 5, 9, 11, 15 and 19, as well as, rearrangements of 1p  are recurrently seen in plasma cell disorders.  Complex karyotypic changes are generally associated with an unfavorable clinical course\par FISH myeloma-  Three copies of CCND1 detected (15.5%)\par \par #On daratuumab kyprolis dexamethasone ( 5/5/20-present) \par Daratumumab # 32W1 of DKd \par He is aware of the answers but declines recommendations such as chemoport placement, radiation for painful bone mets etc\par Labs reviewed and discussed\par Acyclovir 400mg bid \par He is now on daratumumab [id number] and kyprolis 3 weeks on 1 week off\par Patient is clinically doing much better since started treatment with weight gained and no longer needing walker\par CR since July 2021. \par PET/CT (7/21) due worsening low back pain, neck pain, and b/l abdominal pain - No acute findings\par He has been off treatment periodically due to transportation issues - most recent skipping treatment from 11/17/21 - 1/12/22\par -Advised to stick to chemo treatment\par \par Depression and fatigue\par -Started patient on Remeron which he has not been compliant on so advised to d/c. \par -encouraged to be more active and seek more social supports (therapy groups or even a part time job if he can).\par \par Low back pain \par Refer to IR for L4 kyphoplasty but patient continues  to refuse.\par now on Tylenol only\par \par Bone metastases - \par pet/ct scan denied. bone scan as stated above - to have new bone scan which patient wants to reconsider\par Completed Radiation C6-T3 spine, completed 5/20/20\par continue with Oxycodone 5mg prn.\par Stressed to take Ca 600mg bid.\par \par HTN - now on Lisinopril\par \par He will have treatment 3 weeks on 1 week off\par WIll follow up only on Day 1 of each cycle\par Follow up in 4 weeks for C33W1 DKd\par CBC, CMP, myeloma panel\par \par Contact\par Nadia Robert (sister) - [phone]\par \par

## 2022-02-09 NOTE — CONSULT LETTER
[Courtesy Letter:] : I had the pleasure of seeing your patient, [unfilled], in my office today. [FreeTextEntry3] : Aubrey Quinones MD, MPH\par Attending Physician\par Hematology Oncology\par [facility] [place]\par [facility]\par

## 2022-02-09 NOTE — HISTORY OF PRESENT ILLNESS
[de-identified] : Mr. Rolf Robert is 60 year old male with IgG Kappa multiple myeloma transferred care from Dr. Amy Vo. \par \par Patient initially diagnosed with Multiple myeloma in 2018 and received 3 cycles of RVD but developed a severe skin reaction and was switched to Pomalyst/Pomalidomide.   \par After 2 months he was recommended stem cell transplant-> he was completely against transplant and was lost to follow up\par \par In Feb 2020, he developed low back pain radiating down to both hips. CT Pelvis done demonstrating severe joint space narrowing involving the right hip, moderate osteophytic changes on the left. Abnormal appearance of osseous structures of the pelvis with multiple lytic foci throughout the pelvis with a permeative appearance of the cortex of bilateral iliac bones. Mild compression deformity at L4 age indeterminant noted. \par B/l hips pain persisted with 3/2020 X-ray of pelvis noted multifocal lytic lesions throughout the bone.  PT was then by neurosurgery on 3/30/20 who recommended rad onc with MRI on 3/31/20 noted moderate compression fracture deformity involving C7 vertebral body with retropulsion of bone and mild bony central canal stenosis without cord compression or epidural extension. A completely collapsed T2 vertebral body with mild retropulsion of bone into spinal canal with moderate central canal stenosis with indentation of the ventral cord. No significant epidural tumor extension present and no edema noted within the adjacent spinal cord. Moderate pathologic compression fracture of T6 without retropulsion of bone/cord compression. Mild compression fracture of T7 without retropulsion of bone. Mild of T8 with mild retropulsion of bone and mild central canal stenosis. Moderate pathologic compression fracture of T10 without retropulsion/compression. Minimal pathologic compression of inferior endplate of T11. Mild pathologic compression involving T12 and L3. Moderate pathologic compression fracture involving L4 stable compared to prior CT. Mild retropulsion of bone present.\par \par Patient had bone marrow biopsy performed among other MM tests which demonstrated plasma cell neoplasm > 70%. \par Hyperdiploidy and gains of chromosomes 3, 5, 9, 11, 15 and 19, as well as, rearrangements of 1p  are recurrently seen in plasma cell disorders.  Complex karyotypic changes are generally associated with an unfavorable clinical course\par FISH myeloma-  Three copies of CCND1 detected (15.5%)\par \par 4/2/20 skeletal survey demonstrated scattered lytic lesions in the calvarium, spine, and pelvis without fracture or dislocation. Multilevel spondylosis and vertebral wedge compression deformities noted.\par \par Patient was then seen by radiation oncologist Dr. Rica Villalpando on 4/24/20.\par \par \par 1/6/2021 MRI L spine\par Improvement in abnormal bone marrow signal consistent with multiple myeloma.\par Multiple pathologic compression fractures. No acute compression fracture. Worsening of L4\par compression fracture since previous MRI 3/31/2020 with increasing retropulsion of bone resulting in\par right lateral recess stenosis and right L4-L5 foramen stenosis. There is a fluid "cleft" in the L4\par vertebral body suggesting underlying avascular necrosis.\par Although the compression fractures are pathologic fractures consistent with underlying myeloma,\par there is no evidence of tumor extending through vertebral body cortex into the epidural space.\par \par Patient has skipped his treatment since 5/6/21 due to transportation issues.. His sister has not scheduled him for Pet/Ct scan. He still has persistent left abdominal pain, low back pain, and lately noticing he has been losing muscle around his shoulders with more curvature of upper thoracic and cervical spines. \par He stopped Amlodipine in the last few days due to running out of medication. \par \par PET/CT (7/21)\par Reviewed and discussed, Has chronic bone lesions, no acute findings\par \par Patient skipped treatment from 11/17/21 - 1/12/21 due to transportation issues. He is no longer living with his sister but she's still helping him arranging transportation for all his doctor's visits. \par  [de-identified] : He is seen today for Daratumumab # 32 W1 of DKd  - patient skipped treatment since 11/17/21 due to transportation issues\par Current treatment: daratumumab kyprolis dexamethasone - (5/5/20 - present) \par \par He is now in shelter\par His sister arranges his rides\par Still does not want chemoport\par \par Weight = 120->121-> 124 lbs -> 128 -> 126 lbs -->124 -> 127  ->134 lbs -> 131 lbs ->136 lbs -> 146 lbs ->147lbs -> 146 ->147lbs ->150lbs ->148lbs ->149lbs -> 144 lbs -> 147 -> 140lbs -> 147 -> 145 lbs -> 148lbs -> 143 lbs -> 144lbs -> 139 lbs -> 141lbs -> 149 lbs -> 143 lbs -> 144 lbs -> 148 lbs -> 153 lbs  -> 147 lbs -> 147 lbs\par \par

## 2022-02-16 ENCOUNTER — NON-APPOINTMENT (OUTPATIENT)
Age: 62
End: 2022-02-16

## 2022-02-16 ENCOUNTER — RESULT REVIEW (OUTPATIENT)
Age: 62
End: 2022-02-16

## 2022-02-24 ENCOUNTER — NON-APPOINTMENT (OUTPATIENT)
Age: 62
End: 2022-02-24

## 2022-03-09 ENCOUNTER — RESULT REVIEW (OUTPATIENT)
Age: 62
End: 2022-03-09

## 2022-03-09 ENCOUNTER — NON-APPOINTMENT (OUTPATIENT)
Age: 62
End: 2022-03-09

## 2022-03-09 ENCOUNTER — APPOINTMENT (OUTPATIENT)
Dept: HEMATOLOGY ONCOLOGY | Facility: CLINIC | Age: 62
End: 2022-03-09
Payer: MEDICAID

## 2022-03-09 VITALS
SYSTOLIC BLOOD PRESSURE: 173 MMHG | WEIGHT: 149 LBS | BODY MASS INDEX: 23.39 KG/M2 | RESPIRATION RATE: 16 BRPM | HEIGHT: 66.93 IN | OXYGEN SATURATION: 98 % | DIASTOLIC BLOOD PRESSURE: 102 MMHG | HEART RATE: 98 BPM | TEMPERATURE: 98.7 F

## 2022-03-09 PROCEDURE — 99214 OFFICE O/P EST MOD 30 MIN: CPT

## 2022-03-10 NOTE — CONSULT LETTER
[Courtesy Letter:] : I had the pleasure of seeing your patient, [unfilled], in my office today. [FreeTextEntry3] : Aubrey Quinones MD, MPH\par Attending Physician\par Hematology Oncology\par [facility] [place]\par [facility]\par

## 2022-03-10 NOTE — ASSESSMENT
[FreeTextEntry1] : Relapsed IgG Kappa multiple myeloma \par Diagnosed 2018 s/p RVD x 3 cycles.\par Developed severe skin reaction to revlimid and was supposed to be switched ot pomalyst.\par However did not want stem cell transplant and decided to stop follow up\par Presented Feb 2020 with low back pain found to have diffuse bone metastases\par Bone marrow (3/20): > 70% clonal plasma cells\par Hyperdiploidy and gains of chromosomes 3, 5, 9, 11, 15 and 19, as well as, rearrangements of 1p  are recurrently seen in plasma cell disorders.  Complex karyotypic changes are generally associated with an unfavorable clinical course\par FISH myeloma-  Three copies of CCND1 detected (15.5%)\par \par #On daratuumab kyprolis dexamethasone ( 5/5/20-present) \par Daratumumab # 33 W1 of DKd \par Labs reviewed and discussed\par advised to continue with Acyclovir 400mg bid \par He is now on daratumumab [id number] and kyprolis 3 weeks on 1 week off\par Patient is clinically doing much better since started treatment with weight gained and no longer needing walker\par CR since July 2021. \par PET/CT (7/21) due worsening low back pain, neck pain, and b/l abdominal pain - No acute findings\par He has been off treatment periodically due to transportation issues - most recent skipping treatment from 11/17/21 - 1/12/22\par -Patient was sent to ED after infusion nurses noticed bed bugs on his clothes - he was advised to wash all his clothes and self care.  Quin Phipps was asked to be involved. \par \par Depression and fatigue\par -Started patient on Remeron which he has not been compliant on so advised to d/c. \par -encouraged to be more active and seek more social supports (therapy groups or even a part time job if he can).\par \par Low back pain \par Refer to IR for L4 kyphoplasty but patient continues  to refuse.\par now on Tylenol only\par \par Bone metastases - \par pet/ct scan denied. bone scan as stated above - to have new bone scan which patient wants to reconsider\par Completed Radiation C6-T3 spine, completed 5/20/20\par continue with Oxycodone 5mg prn.\par Stressed to take Ca 600mg bid.\par \par HTN - now on Lisinopril\par \par He will have treatment 3 weeks on 1 week off\par WIll follow up only on Day 1 of each cycle\par Follow up in 4 weeks for C34W1 DKd\par CBC, CMP, myeloma panel\par \par Contact\par Nadia Robert (sister) - [phone]\par \par

## 2022-03-10 NOTE — HISTORY OF PRESENT ILLNESS
[de-identified] : Mr. Rolf Robert is 60 year old male with IgG Kappa multiple myeloma transferred care from Dr. Amy Vo. \par \par Patient initially diagnosed with Multiple myeloma in 2018 and received 3 cycles of RVD but developed a severe skin reaction and was switched to Pomalyst/Pomalidomide.   \par After 2 months he was recommended stem cell transplant-> he was completely against transplant and was lost to follow up\par \par In Feb 2020, he developed low back pain radiating down to both hips. CT Pelvis done demonstrating severe joint space narrowing involving the right hip, moderate osteophytic changes on the left. Abnormal appearance of osseous structures of the pelvis with multiple lytic foci throughout the pelvis with a permeative appearance of the cortex of bilateral iliac bones. Mild compression deformity at L4 age indeterminant noted. \par B/l hips pain persisted with 3/2020 X-ray of pelvis noted multifocal lytic lesions throughout the bone.  PT was then by neurosurgery on 3/30/20 who recommended rad onc with MRI on 3/31/20 noted moderate compression fracture deformity involving C7 vertebral body with retropulsion of bone and mild bony central canal stenosis without cord compression or epidural extension. A completely collapsed T2 vertebral body with mild retropulsion of bone into spinal canal with moderate central canal stenosis with indentation of the ventral cord. No significant epidural tumor extension present and no edema noted within the adjacent spinal cord. Moderate pathologic compression fracture of T6 without retropulsion of bone/cord compression. Mild compression fracture of T7 without retropulsion of bone. Mild of T8 with mild retropulsion of bone and mild central canal stenosis. Moderate pathologic compression fracture of T10 without retropulsion/compression. Minimal pathologic compression of inferior endplate of T11. Mild pathologic compression involving T12 and L3. Moderate pathologic compression fracture involving L4 stable compared to prior CT. Mild retropulsion of bone present.\par \par Patient had bone marrow biopsy performed among other MM tests which demonstrated plasma cell neoplasm > 70%. \par Hyperdiploidy and gains of chromosomes 3, 5, 9, 11, 15 and 19, as well as, rearrangements of 1p  are recurrently seen in plasma cell disorders.  Complex karyotypic changes are generally associated with an unfavorable clinical course\par FISH myeloma-  Three copies of CCND1 detected (15.5%)\par \par 4/2/20 skeletal survey demonstrated scattered lytic lesions in the calvarium, spine, and pelvis without fracture or dislocation. Multilevel spondylosis and vertebral wedge compression deformities noted.\par \par Patient was then seen by radiation oncologist Dr. Rica Villalpando on 4/24/20.\par \par \par 1/6/2021 MRI L spine\par Improvement in abnormal bone marrow signal consistent with multiple myeloma.\par Multiple pathologic compression fractures. No acute compression fracture. Worsening of L4\par compression fracture since previous MRI 3/31/2020 with increasing retropulsion of bone resulting in\par right lateral recess stenosis and right L4-L5 foramen stenosis. There is a fluid "cleft" in the L4\par vertebral body suggesting underlying avascular necrosis.\par Although the compression fractures are pathologic fractures consistent with underlying myeloma,\par there is no evidence of tumor extending through vertebral body cortex into the epidural space.\par \par Patient has skipped his treatment since 5/6/21 due to transportation issues.. His sister has not scheduled him for Pet/Ct scan. He still has persistent left abdominal pain, low back pain, and lately noticing he has been losing muscle around his shoulders with more curvature of upper thoracic and cervical spines. \par He stopped Amlodipine in the last few days due to running out of medication. \par \par PET/CT (7/21)\par Reviewed and discussed, Has chronic bone lesions, no acute findings\par \par Patient skipped treatment from 11/17/21 - 1/12/21 due to transportation issues. He is no longer living with his sister but she's still helping him arranging transportation for all his doctor's visits. \par  [de-identified] : He is seen today for Daratumumab # 33 W1 of DKd  - patient skipped treatment since 11/17/21 due to transportation issues\par Current treatment: daratumumab kyprolis dexamethasone - (5/5/20 - present) \par \par Patient is currently at the shelter, his sister Camille called over the phone and states patient is no really communicating with her other than asking for her to set up for transportation for his doctor's visits. \par He has been seen by new PCP locally who has prescribed him new BP medications (can't recall the name).\par Overall doing well but still inquiring new chemo treatment that doesn't require IV. \par \par Weight = 120->121-> 124 lbs -> 128 -> 126 lbs -->124 -> 127  ->134 lbs -> 131 lbs ->136 lbs -> 146 lbs ->147lbs -> 146 ->147lbs ->150lbs ->148lbs ->149lbs -> 144 lbs -> 147 -> 140lbs -> 147 -> 145 lbs -> 148lbs -> 143 lbs -> 144lbs -> 139 lbs -> 141lbs -> 149 lbs -> 143 lbs -> 144 lbs -> 148 lbs -> 153 lbs  -> 147 lbs -> 147 lbs\par \par

## 2022-03-14 ENCOUNTER — NON-APPOINTMENT (OUTPATIENT)
Age: 62
End: 2022-03-14

## 2022-03-15 ENCOUNTER — NON-APPOINTMENT (OUTPATIENT)
Age: 62
End: 2022-03-15

## 2022-03-16 ENCOUNTER — NON-APPOINTMENT (OUTPATIENT)
Age: 62
End: 2022-03-16

## 2022-03-16 ENCOUNTER — RESULT REVIEW (OUTPATIENT)
Age: 62
End: 2022-03-16

## 2022-03-22 ENCOUNTER — NON-APPOINTMENT (OUTPATIENT)
Age: 62
End: 2022-03-22

## 2022-03-23 ENCOUNTER — NON-APPOINTMENT (OUTPATIENT)
Age: 62
End: 2022-03-23

## 2022-03-29 ENCOUNTER — NON-APPOINTMENT (OUTPATIENT)
Age: 62
End: 2022-03-29

## 2022-04-06 ENCOUNTER — NON-APPOINTMENT (OUTPATIENT)
Age: 62
End: 2022-04-06

## 2022-04-06 ENCOUNTER — RESULT REVIEW (OUTPATIENT)
Age: 62
End: 2022-04-06

## 2022-04-06 ENCOUNTER — APPOINTMENT (OUTPATIENT)
Dept: HEMATOLOGY ONCOLOGY | Facility: CLINIC | Age: 62
End: 2022-04-06
Payer: MEDICAID

## 2022-04-06 VITALS
SYSTOLIC BLOOD PRESSURE: 144 MMHG | DIASTOLIC BLOOD PRESSURE: 96 MMHG | TEMPERATURE: 99.3 F | RESPIRATION RATE: 16 BRPM | HEART RATE: 82 BPM | WEIGHT: 150.5 LBS | BODY MASS INDEX: 23.62 KG/M2 | OXYGEN SATURATION: 98 % | HEIGHT: 66.93 IN

## 2022-04-06 DIAGNOSIS — S32.040A WEDGE COMPRESSION FRACTURE OF FOURTH LUMBAR VERTEBRA, INITIAL ENCOUNTER FOR CLOSED FRACTURE: ICD-10-CM

## 2022-04-06 PROCEDURE — 99215 OFFICE O/P EST HI 40 MIN: CPT | Mod: 25

## 2022-04-07 PROBLEM — S32.040A COMPRESSION FRACTURE OF L4 VERTEBRA: Status: ACTIVE | Noted: 2021-01-07

## 2022-04-07 NOTE — HISTORY OF PRESENT ILLNESS
[de-identified] : Mr. Rolf Robert is 60 year old male with IgG Kappa multiple myeloma transferred care from Dr. Amy Vo. \par \par Patient initially diagnosed with Multiple myeloma in 2018 and received 3 cycles of RVD but developed a severe skin reaction and was switched to Pomalyst/Pomalidomide.   \par After 2 months he was recommended stem cell transplant-> he was completely against transplant and was lost to follow up\par \par In Feb 2020, he developed low back pain radiating down to both hips. CT Pelvis done demonstrating severe joint space narrowing involving the right hip, moderate osteophytic changes on the left. Abnormal appearance of osseous structures of the pelvis with multiple lytic foci throughout the pelvis with a permeative appearance of the cortex of bilateral iliac bones. Mild compression deformity at L4 age indeterminant noted. \par B/l hips pain persisted with 3/2020 X-ray of pelvis noted multifocal lytic lesions throughout the bone.  PT was then by neurosurgery on 3/30/20 who recommended rad onc with MRI on 3/31/20 noted moderate compression fracture deformity involving C7 vertebral body with retropulsion of bone and mild bony central canal stenosis without cord compression or epidural extension. A completely collapsed T2 vertebral body with mild retropulsion of bone into spinal canal with moderate central canal stenosis with indentation of the ventral cord. No significant epidural tumor extension present and no edema noted within the adjacent spinal cord. Moderate pathologic compression fracture of T6 without retropulsion of bone/cord compression. Mild compression fracture of T7 without retropulsion of bone. Mild of T8 with mild retropulsion of bone and mild central canal stenosis. Moderate pathologic compression fracture of T10 without retropulsion/compression. Minimal pathologic compression of inferior endplate of T11. Mild pathologic compression involving T12 and L3. Moderate pathologic compression fracture involving L4 stable compared to prior CT. Mild retropulsion of bone present.\par \par Patient had bone marrow biopsy performed among other MM tests which demonstrated plasma cell neoplasm > 70%. \par Hyperdiploidy and gains of chromosomes 3, 5, 9, 11, 15 and 19, as well as, rearrangements of 1p  are recurrently seen in plasma cell disorders.  Complex karyotypic changes are generally associated with an unfavorable clinical course\par FISH myeloma-  Three copies of CCND1 detected (15.5%)\par \par 4/2/20 skeletal survey demonstrated scattered lytic lesions in the calvarium, spine, and pelvis without fracture or dislocation. Multilevel spondylosis and vertebral wedge compression deformities noted.\par \par Patient was then seen by radiation oncologist Dr. Rica Villalpando on 4/24/20.\par \par \par 1/6/2021 MRI L spine\par Improvement in abnormal bone marrow signal consistent with multiple myeloma.\par Multiple pathologic compression fractures. No acute compression fracture. Worsening of L4\par compression fracture since previous MRI 3/31/2020 with increasing retropulsion of bone resulting in\par right lateral recess stenosis and right L4-L5 foramen stenosis. There is a fluid "cleft" in the L4\par vertebral body suggesting underlying avascular necrosis.\par Although the compression fractures are pathologic fractures consistent with underlying myeloma,\par there is no evidence of tumor extending through vertebral body cortex into the epidural space.\par \par Patient has skipped his treatment since 5/6/21 due to transportation issues.. His sister has not scheduled him for Pet/Ct scan. He still has persistent left abdominal pain, low back pain, and lately noticing he has been losing muscle around his shoulders with more curvature of upper thoracic and cervical spines. \par He stopped Amlodipine in the last few days due to running out of medication. \par \par PET/CT (7/21)\par Reviewed and discussed, Has chronic bone lesions, no acute findings\par \par Patient skipped treatment from 11/17/21 - 1/12/21 due to transportation issues. He is no longer living with his sister but she's still helping him arranging transportation for all his doctor's visits. \par  [de-identified] : He is seen today for Daratumumab # 34 W1 of DKd  - patient skipped treatment since 11/17/21 due to transportation issues\par Current treatment: daratumumab kyprolis dexamethasone - (5/5/20 - present) \par \par Patient reports of not generally not feeling well with fatigue and weakness, nausea, and left hip/thigh pain along with his low back. No changes in gait. He is still at shelter, comes to his sister's house to shower before coming to the infusion.\par Patient has yet vaccinated, getting rapid Covid swab before each treatment. \par \par Weight = 120->121-> 124 lbs -> 128 -> 126 lbs -->124 -> 127  ->134 lbs -> 131 lbs ->136 lbs -> 146 lbs ->147lbs -> 146 ->147lbs ->150lbs ->148lbs ->149lbs -> 144 lbs -> 147 -> 140lbs -> 147 -> 145 lbs -> 148lbs -> 143 lbs -> 144lbs -> 139 lbs -> 141lbs -> 149 lbs -> 143 lbs -> 144 lbs -> 148 lbs -> 153 lbs  -> 147 lbs -> 147 lbs -> 150 lbs \par \par

## 2022-04-07 NOTE — ASSESSMENT
[FreeTextEntry1] : Relapsed IgG Kappa multiple myeloma \par Diagnosed 2018 s/p RVD x 3 cycles.\par Developed severe skin reaction to revlimid and was supposed to be switched ot pomalyst.\par However did not want stem cell transplant and decided to stop follow up\par Presented Feb 2020 with low back pain found to have diffuse bone metastases\par Bone marrow (3/20): > 70% clonal plasma cells\par Hyperdiploidy and gains of chromosomes 3, 5, 9, 11, 15 and 19, as well as, rearrangements of 1p  are recurrently seen in plasma cell disorders.  Complex karyotypic changes are generally associated with an unfavorable clinical course\par FISH myeloma-  Three copies of CCND1 detected (15.5%)\par \par #On daratuumab kyprolis dexamethasone ( 5/5/20-present) \par Daratumumab # 34 W1 of DKd \par Labs reviewed and discussed\par advised to continue with Acyclovir 400mg bid - pattient claims on he's on the medication\par He is now on daratumumab [id number] and kyprolis 3 weeks on 1 week off\par Patient is clinically doing much better since started treatment with weight gained and no longer needing walker\par CR since July 2021. \par PET/CT (7/21) due worsening low back pain, neck pain, and b/l abdominal pain - No acute findings\par He has been off treatment periodically due to transportation issues with longest halt btw 11/17/21 - 1/12/22\par Labs reviewed, analyzed, and discussed with patient\par -He is now with some nausea, fatigue, left hip and low back pain - declined antiemetic and not compliant with his  medications, doesn't follow up with PCP for wellness check up. He's also having lots of social issues (living at the shelter and severely depressed as per his sister but declined any supports from her or anyone else). \par -Explained to him that we should continue with same treatment since he's responding to treatment with low paraproteins. \par -To have new Pet/CT scan\par \par Depression and fatigue\par -Started patient on Remeron which he has not been compliant on so advised to d/c. \par -encouraged to be more active and seek more social supports (therapy groups or even a part time job if he can).\par \par Low back pain \par Refer to IR for L4 kyphoplasty but patient continues  to refuse.\par now on Tylenol only\par \par Bone metastases - \par pet/ct scan was denied in the past. bone scan as stated above \par Will try to get new approval for Pet/CT scan\par Completed Radiation C6-T3 spine, completed 5/20/20\par Stressed to take Ca 600mg bid.\par \par HTN - now on Lisinopril\par \par He will have treatment 3 weeks on 1 week off\par WIll follow up only on Day 1 of each cycle\par Follow up in 4 weeks for C34W1 DKd\par CBC, CMP, myeloma panel\par \par Contact\par Nadia Robert (sister) - [phone]\par \par

## 2022-04-07 NOTE — CONSULT LETTER
[Courtesy Letter:] : I had the pleasure of seeing your patient, [unfilled], in my office today. [FreeTextEntry3] : Aubrey Quinones MD, MPH\par Attending Physician\par Hematology Oncology\par [facility] [place]\par [facility]\par

## 2022-04-12 ENCOUNTER — NON-APPOINTMENT (OUTPATIENT)
Age: 62
End: 2022-04-12

## 2022-04-13 ENCOUNTER — NON-APPOINTMENT (OUTPATIENT)
Age: 62
End: 2022-04-13

## 2022-04-13 ENCOUNTER — RESULT REVIEW (OUTPATIENT)
Age: 62
End: 2022-04-13

## 2022-04-20 ENCOUNTER — RESULT REVIEW (OUTPATIENT)
Age: 62
End: 2022-04-20

## 2022-05-04 ENCOUNTER — RESULT REVIEW (OUTPATIENT)
Age: 62
End: 2022-05-04

## 2022-05-04 ENCOUNTER — APPOINTMENT (OUTPATIENT)
Dept: HEMATOLOGY ONCOLOGY | Facility: CLINIC | Age: 62
End: 2022-05-04

## 2022-05-11 ENCOUNTER — APPOINTMENT (OUTPATIENT)
Dept: HEMATOLOGY ONCOLOGY | Facility: CLINIC | Age: 62
End: 2022-05-11
Payer: MEDICAID

## 2022-05-11 ENCOUNTER — RESULT REVIEW (OUTPATIENT)
Age: 62
End: 2022-05-11

## 2022-05-11 DIAGNOSIS — R53.81 OTHER MALAISE: ICD-10-CM

## 2022-05-11 PROCEDURE — 99215 OFFICE O/P EST HI 40 MIN: CPT | Mod: 25

## 2022-05-12 PROBLEM — R53.81 PHYSICAL DECONDITIONING: Status: ACTIVE | Noted: 2020-07-16

## 2022-05-12 NOTE — ASSESSMENT
[FreeTextEntry1] : Relapsed IgG Kappa multiple myeloma \par Diagnosed 2018 s/p RVD x 3 cycles.\par Developed severe skin reaction to revlimid and was supposed to be switched ot pomalyst.\par However did not want stem cell transplant and decided to stop follow up\par Presented Feb 2020 with low back pain found to have diffuse bone metastases\par Bone marrow (3/20): > 70% clonal plasma cells\par Hyperdiploidy and gains of chromosomes 3, 5, 9, 11, 15 and 19, as well as, rearrangements of 1p  are recurrently seen in plasma cell disorders.  Complex karyotypic changes are generally associated with an unfavorable clinical course\par FISH myeloma-  Three copies of CCND1 detected (15.5%)\par \par #On daratuumab kyprolis dexamethasone ( 5/5/20- 5/12/22)  - total of Daratumumab # 35 \par advised to continue with Acyclovir 400 mg bid - patient claims on he's on the medication\par Patient is clinically doing much better since started treatment with weight gained and no longer needing walker\par CR since July 2021. \par PET/CT (7/21) due worsening low back pain, neck pain, and b/l abdominal pain - No acute findings\par -His paraproteins remain low\par -Labs are drawn in the office, reviewed, analyzed, and discussed\par -He is now with some nausea, fatigue, left hip and low back pain - declined antiemetic and not compliant with his  medications, doesn't follow up with PCP for wellness check up. He's also having lots of social issues (living at the shelter and severely depressed as per his sister but declined any supports from her or anyone else). \par -Given his tolerance, poor IV access, and transportation issues - we'll switch his treatment to Faspro + Dec monthly in four weeks. \par \par #HTN\par He is on Losartan 100 mg and HCTZ 25 mg by his local PCP.\par BP was 180/103 once finished with chemo treatment with b/l lower ext edema -  patient with no complaints. Advised to check his BP and follows up with his PCP. \par \par #Low back pain \par Refer to IR for L4 kyphoplasty but patient continues  to refuse.\par now on Tylenol only\par \par #Bone metastases - \par on Xgeva with last dose on 1/2022 - will give with next treatment\par Completed Radiation C6-T3 spine, completed 5/20/20\par Stressed to take Ca 600mg bid.\par \par -patient was also seen by Dr. Quinones \par Follow up in 4 weeks for Faspro + Dec\par CBC, CMP, myeloma panel\par \par Contact\par CharlaRobertoRaya Robert (sister) - [phone]\par \par

## 2022-05-12 NOTE — HISTORY OF PRESENT ILLNESS
[de-identified] : Mr. Rolf Robert is 60 year old male with IgG Kappa multiple myeloma transferred care from Dr. Amy Vo. \par \par Patient initially diagnosed with Multiple myeloma in 2018 and received 3 cycles of RVD but developed a severe skin reaction and was switched to Pomalyst/Pomalidomide.   \par After 2 months he was recommended stem cell transplant-> he was completely against transplant and was lost to follow up\par \par In Feb 2020, he developed low back pain radiating down to both hips. CT Pelvis done demonstrating severe joint space narrowing involving the right hip, moderate osteophytic changes on the left. Abnormal appearance of osseous structures of the pelvis with multiple lytic foci throughout the pelvis with a permeative appearance of the cortex of bilateral iliac bones. Mild compression deformity at L4 age indeterminant noted. \par B/l hips pain persisted with 3/2020 X-ray of pelvis noted multifocal lytic lesions throughout the bone.  PT was then by neurosurgery on 3/30/20 who recommended rad onc with MRI on 3/31/20 noted moderate compression fracture deformity involving C7 vertebral body with retropulsion of bone and mild bony central canal stenosis without cord compression or epidural extension. A completely collapsed T2 vertebral body with mild retropulsion of bone into spinal canal with moderate central canal stenosis with indentation of the ventral cord. No significant epidural tumor extension present and no edema noted within the adjacent spinal cord. Moderate pathologic compression fracture of T6 without retropulsion of bone/cord compression. Mild compression fracture of T7 without retropulsion of bone. Mild of T8 with mild retropulsion of bone and mild central canal stenosis. Moderate pathologic compression fracture of T10 without retropulsion/compression. Minimal pathologic compression of inferior endplate of T11. Mild pathologic compression involving T12 and L3. Moderate pathologic compression fracture involving L4 stable compared to prior CT. Mild retropulsion of bone present.\par \par Patient had bone marrow biopsy performed among other MM tests which demonstrated plasma cell neoplasm > 70%. \par Hyperdiploidy and gains of chromosomes 3, 5, 9, 11, 15 and 19, as well as, rearrangements of 1p  are recurrently seen in plasma cell disorders.  Complex karyotypic changes are generally associated with an unfavorable clinical course\par FISH myeloma-  Three copies of CCND1 detected (15.5%)\par \par 4/2/20 skeletal survey demonstrated scattered lytic lesions in the calvarium, spine, and pelvis without fracture or dislocation. Multilevel spondylosis and vertebral wedge compression deformities noted.\par \par Patient was then seen by radiation oncologist Dr. Rica Villalpando on 4/24/20.\par \par \par 1/6/2021 MRI L spine\par Improvement in abnormal bone marrow signal consistent with multiple myeloma.\par Multiple pathologic compression fractures. No acute compression fracture. Worsening of L4\par compression fracture since previous MRI 3/31/2020 with increasing retropulsion of bone resulting in\par right lateral recess stenosis and right L4-L5 foramen stenosis. There is a fluid "cleft" in the L4\par vertebral body suggesting underlying avascular necrosis.\par Although the compression fractures are pathologic fractures consistent with underlying myeloma,\par there is no evidence of tumor extending through vertebral body cortex into the epidural space.\par \par Patient has skipped his treatment since 5/6/21 due to transportation issues.. His sister has not scheduled him for Pet/Ct scan. He still has persistent left abdominal pain, low back pain, and lately noticing he has been losing muscle around his shoulders with more curvature of upper thoracic and cervical spines. \par He stopped Amlodipine in the last few days due to running out of medication. \par \par PET/CT (7/21)\par Reviewed and discussed, Has chronic bone lesions, no acute findings\par \par Patient skipped treatment from 11/17/21 - 1/12/21 due to transportation issues. He is no longer living with his sister but she's still helping him arranging transportation for all his doctor's visits. \par  [de-identified] : He is seen today for Daratumumab # 35 W2 of DKd  - patient skipped treatment since 11/17/21 due to transportation issues\par Current treatment: daratumumab kyprolis dexamethasone - (5/5/20 - present) \par \par Patient is currently still at the shelter\par He has nausea  and diarrhea for two days after each treatments but refusing any medications.\par He does not any IV infusion treatment, opting for either injection or PO given having phlebitis from last treatment. \par \par Weight = 120->121-> 124 lbs -> 128 -> 126 lbs -->124 -> 127  ->134 lbs -> 131 lbs ->136 lbs -> 146 lbs ->147lbs -> 146 ->147lbs ->150lbs ->148lbs ->149lbs -> 144 lbs -> 147 -> 140lbs -> 147 -> 145 lbs -> 148lbs -> 143 lbs -> 144lbs -> 139 lbs -> 141lbs -> 149 lbs -> 143 lbs -> 144 lbs -> 148 lbs -> 153 lbs  -> 147 lbs -> 147 lbs -> 150 lbs \par \par

## 2022-05-12 NOTE — CONSULT LETTER
[Courtesy Letter:] : I had the pleasure of seeing your patient, [unfilled], in my office today. [FreeTextEntry3] : Aubrey Quinones MD, MPH\par Attending Physician\par Hematology Oncology\par [facility] [place]\par [facility]\par

## 2022-05-13 ENCOUNTER — NON-APPOINTMENT (OUTPATIENT)
Age: 62
End: 2022-05-13

## 2022-06-01 ENCOUNTER — APPOINTMENT (OUTPATIENT)
Dept: HEMATOLOGY ONCOLOGY | Facility: CLINIC | Age: 62
End: 2022-06-01

## 2022-06-07 ENCOUNTER — NON-APPOINTMENT (OUTPATIENT)
Age: 62
End: 2022-06-07

## 2022-06-08 ENCOUNTER — RESULT REVIEW (OUTPATIENT)
Age: 62
End: 2022-06-08

## 2022-06-08 ENCOUNTER — APPOINTMENT (OUTPATIENT)
Dept: HEMATOLOGY ONCOLOGY | Facility: CLINIC | Age: 62
End: 2022-06-08
Payer: MEDICAID

## 2022-06-08 VITALS
RESPIRATION RATE: 18 BRPM | WEIGHT: 145.1 LBS | HEIGHT: 66.93 IN | BODY MASS INDEX: 22.78 KG/M2 | SYSTOLIC BLOOD PRESSURE: 120 MMHG | HEART RATE: 99 BPM | TEMPERATURE: 97.4 F | DIASTOLIC BLOOD PRESSURE: 84 MMHG

## 2022-06-08 DIAGNOSIS — Z11.59 ENCOUNTER FOR SCREENING FOR OTHER VIRAL DISEASES: ICD-10-CM

## 2022-06-08 PROCEDURE — 99214 OFFICE O/P EST MOD 30 MIN: CPT | Mod: 25

## 2022-06-08 RX ORDER — LISINOPRIL 30 MG/1
TABLET ORAL
Refills: 0 | Status: DISCONTINUED | COMMUNITY
End: 2022-06-08

## 2022-06-08 RX ORDER — ASPIRIN ENTERIC COATED TABLETS 81 MG 81 MG/1
81 TABLET, DELAYED RELEASE ORAL
Qty: 30 | Refills: 0 | Status: ACTIVE | COMMUNITY
Start: 2021-10-27 | End: 1900-01-01

## 2022-06-08 RX ORDER — LOSARTAN POTASSIUM AND HYDROCHLOROTHIAZIDE 25; 100 MG/1; MG/1
100-25 TABLET ORAL
Qty: 30 | Refills: 0 | Status: ACTIVE | COMMUNITY
Start: 2022-06-01

## 2022-06-08 NOTE — CONSULT LETTER
[Courtesy Letter:] : I had the pleasure of seeing your patient, [unfilled], in my office today. [FreeTextEntry3] : Aubrey Quinones MD, MPH\par Attending Physician\par Hematology Oncology\par [facility] [place]\par [facility]\par

## 2022-06-08 NOTE — ASSESSMENT
[FreeTextEntry1] : ## Relapsed IgG Kappa multiple myeloma \par Diagnosed 2018 s/p RVD x 3 cycles.\par Developed severe skin reaction to revlimid and was supposed to be switched to pomalyst.\par However did not want stem cell transplant and decided to stop follow up\par \par Patient transferred care to us in Feb 2020\par Presented in Feb 2020 with low back pain found to have diffuse bone metastases\par Bone marrow (3/20): > 70% clonal plasma cells\par Hyperdiploidy and gains of chromosomes 3, 5, 9, 11, 15 and 19, as well as, rearrangements of 1p  are recurrently seen in plasma cell disorders.  Complex karyotypic changes are generally associated with an unfavorable clinical course\par FISH myeloma-  Three copies of CCND1 detected (15.5%)\par s/p On daratuumab kyprolis dexamethasone ( 5/5/20- 5/12/22)  - total of Daratumumab # 35 \par advised to continue with Acyclovir 400 mg bid - patient claims on he's on the medication\par -With treatment, he started to do well with weight gained and no longer needing walker. \par -Labs with paraprotein responding to treatment - CR since July 2021. \par PET/CT (7/21) due worsening low back pain, neck pain, and b/l abdominal pain - No acute findings\par \par -Patient with ongoing issues with skipping treatments due to transportation issues  not being compliant with medications, following up with PCP, living at the shelter in [place], and needing our social workers/staffs to reach out to his sister and arranging all his medical care. \par -Due to his poor IV access and fatigue, his treatment is switched to Faspro. \par -to get Faspro + Dec today (6/8/22). \par --Labs are drawn in the office, reviewed, analyzed, and discussed\par - chronic bone and rib pain - to get new bone scan advised. \par -Patient is disappointed that he's not 'cured' and we're not looking to register him in any 'immunotherapy trial", We explained to him extensively that although he's not cured, he's in remission with low paraprotein and clinically going much better than he did 2 years ago. \par -he's currently seeking 2nd opinion at [facility] for new treatment protocol and trials. \par \par #Low back pain  - 2/2 to bone mets. \par 1/2021 MRI Lumbar noted for multiple compression fractures, worse at L4\par Refer to IR for L4 kyphoplasty but patient repeated declined\par \par #HTN\par He is on Losartan 100 mg and HCTZ 25 mg by his local PCP.Advised to check his BP and follows up with his PCP. \par \par #Bone metastases - \par on Xgeva with last dose on 1/2022 - will give with next treatment\par Completed Radiation C6-T3 spine, completed 5/20/20\par Stressed to take Ca 600mg bid.\par \par Follow up in 4 weeks for Faspro + Dec\par CBC, CMP, myeloma panel\par \par Contact\par Nadia Robert (sister) - [phone]\par \par

## 2022-06-13 ENCOUNTER — NON-APPOINTMENT (OUTPATIENT)
Age: 62
End: 2022-06-13

## 2022-06-22 ENCOUNTER — RESULT REVIEW (OUTPATIENT)
Age: 62
End: 2022-06-22

## 2022-07-05 ENCOUNTER — RESULT REVIEW (OUTPATIENT)
Age: 62
End: 2022-07-05

## 2022-07-05 ENCOUNTER — APPOINTMENT (OUTPATIENT)
Dept: HEMATOLOGY ONCOLOGY | Facility: CLINIC | Age: 62
End: 2022-07-05

## 2022-07-05 VITALS
HEART RATE: 80 BPM | RESPIRATION RATE: 18 BRPM | DIASTOLIC BLOOD PRESSURE: 76 MMHG | WEIGHT: 157 LBS | HEIGHT: 66.93 IN | TEMPERATURE: 98.1 F | BODY MASS INDEX: 24.64 KG/M2 | OXYGEN SATURATION: 99 % | SYSTOLIC BLOOD PRESSURE: 148 MMHG

## 2022-07-05 PROCEDURE — 99214 OFFICE O/P EST MOD 30 MIN: CPT | Mod: 25

## 2022-07-05 PROCEDURE — 36415 COLL VENOUS BLD VENIPUNCTURE: CPT

## 2022-07-05 RX ORDER — LORATADINE 10 MG/1
10 TABLET ORAL
Qty: 30 | Refills: 0 | Status: ACTIVE | COMMUNITY
Start: 2022-06-10

## 2022-07-05 RX ORDER — MULTIVITAMIN
TABLET ORAL
Qty: 30 | Refills: 0 | Status: ACTIVE | COMMUNITY
Start: 2022-02-07

## 2022-07-05 RX ORDER — ERGOCALCIFEROL 1.25 MG/1
1.25 MG CAPSULE, LIQUID FILLED ORAL
Qty: 4 | Refills: 0 | Status: ACTIVE | COMMUNITY
Start: 2022-06-10

## 2022-07-05 RX ORDER — BUDESONIDE AND FORMOTEROL FUMARATE DIHYDRATE 160; 4.5 UG/1; UG/1
160-4.5 AEROSOL RESPIRATORY (INHALATION)
Qty: 10 | Refills: 0 | Status: ACTIVE | COMMUNITY
Start: 2022-06-10

## 2022-07-05 RX ORDER — NEOMYCIN SULFATE, POLYMYXIN B SULFATE AND HYDROCORTISONE 3.5; 10000; 1 MG/ML; [IU]/ML; MG/ML
3.5-10000-1 SOLUTION AURICULAR (OTIC)
Qty: 10 | Refills: 0 | Status: ACTIVE | COMMUNITY
Start: 2022-06-10

## 2022-07-05 NOTE — HISTORY OF PRESENT ILLNESS
[de-identified] : Mr. Rolf Robert is 60 year old male with IgG Kappa multiple myeloma transferred care from Dr. Amy Vo. \par \par Patient initially diagnosed with Multiple myeloma in 2018 and received 3 cycles of RVD but developed a severe skin reaction and was switched to Pomalyst/Pomalidomide.   \par After 2 months he was recommended stem cell transplant-> he was completely against transplant and was lost to follow up\par \par In Feb 2020, he developed low back pain radiating down to both hips. CT Pelvis done demonstrating severe joint space narrowing involving the right hip, moderate osteophytic changes on the left. Abnormal appearance of osseous structures of the pelvis with multiple lytic foci throughout the pelvis with a permeative appearance of the cortex of bilateral iliac bones. Mild compression deformity at L4 age indeterminant noted. \par B/l hips pain persisted with 3/2020 X-ray of pelvis noted multifocal lytic lesions throughout the bone.  PT was then by neurosurgery on 3/30/20 who recommended rad onc with MRI on 3/31/20 noted moderate compression fracture deformity involving C7 vertebral body with retropulsion of bone and mild bony central canal stenosis without cord compression or epidural extension. A completely collapsed T2 vertebral body with mild retropulsion of bone into spinal canal with moderate central canal stenosis with indentation of the ventral cord. No significant epidural tumor extension present and no edema noted within the adjacent spinal cord. Moderate pathologic compression fracture of T6 without retropulsion of bone/cord compression. Mild compression fracture of T7 without retropulsion of bone. Mild of T8 with mild retropulsion of bone and mild central canal stenosis. Moderate pathologic compression fracture of T10 without retropulsion/compression. Minimal pathologic compression of inferior endplate of T11. Mild pathologic compression involving T12 and L3. Moderate pathologic compression fracture involving L4 stable compared to prior CT. Mild retropulsion of bone present.\par \par Patient had bone marrow biopsy performed among other MM tests which demonstrated plasma cell neoplasm > 70%. \par Hyperdiploidy and gains of chromosomes 3, 5, 9, 11, 15 and 19, as well as, rearrangements of 1p  are recurrently seen in plasma cell disorders.  Complex karyotypic changes are generally associated with an unfavorable clinical course\par FISH myeloma-  Three copies of CCND1 detected (15.5%)\par \par 4/2/20 skeletal survey demonstrated scattered lytic lesions in the calvarium, spine, and pelvis without fracture or dislocation. Multilevel spondylosis and vertebral wedge compression deformities noted.\par \par Patient was then seen by radiation oncologist Dr. Rica Villalpando on 4/24/20.\par \par \par 1/6/2021 MRI L spine\par Improvement in abnormal bone marrow signal consistent with multiple myeloma.\par Multiple pathologic compression fractures. No acute compression fracture. Worsening of L4\par compression fracture since previous MRI 3/31/2020 with increasing retropulsion of bone resulting in\par right lateral recess stenosis and right L4-L5 foramen stenosis. There is a fluid "cleft" in the L4\par vertebral body suggesting underlying avascular necrosis.\par Although the compression fractures are pathologic fractures consistent with underlying myeloma,\par there is no evidence of tumor extending through vertebral body cortex into the epidural space.\par \par Patient has skipped his treatment since 5/6/21 due to transportation issues.. His sister has not scheduled him for Pet/Ct scan. He still has persistent left abdominal pain, low back pain, and lately noticing he has been losing muscle around his shoulders with more curvature of upper thoracic and cervical spines. \par He stopped Amlodipine in the last few days due to running out of medication. \par \par PET/CT (7/21)\par Reviewed and discussed, Has chronic bone lesions, no acute findings\par \par Patient skipped treatment from 11/17/21 - 1/12/21 due to transportation issues. He is no longer living with his sister but she's still helping him arranging transportation for all his doctor's visits. \par  [de-identified] : Patient with Faspro + Dec  # 2  (6/8/22 - present )\par s/p daratumumab kyprolis dexamethasone - (5/5/20 - 5/11/2022) \par \par Patient reports of still having generalized bone pain but not worse and his gait is not affected. \par He's feeling better now that treatment is changed to Faspro SQ. \par \par 6/22/22 bone scan \par There is a newly evident focal site of increased tracer localization in the left temporal calvarium.\par There has been a decrease in the intensity of abnormal increased tracer localization at previously evident sites of abnormality at T8 and T9.\par There has been no gross change in numerous other sites of abnormal increased tracer localization in the bony skeleton.\par \par Weight = 120->121-> 124 lbs -> 128 -> 126 lbs -->124 -> 127  ->134 lbs -> 131 lbs ->136 lbs -> 146 lbs ->147lbs -> 146 ->147lbs ->150lbs ->148lbs ->149lbs -> 144 lbs -> 147 -> 140lbs -> 147 -> 145 lbs -> 148lbs -> 143 lbs -> 144lbs -> 139 lbs -> 141lbs -> 149 lbs -> 143 lbs -> 144 lbs -> 148 lbs -> 153 lbs  -> 147 lbs -> 147 lbs -> 150 lbs -> 145 lbs \par \par

## 2022-07-05 NOTE — CONSULT LETTER
[Courtesy Letter:] : I had the pleasure of seeing your patient, [unfilled], in my office today. [FreeTextEntry3] : Aubrey Quinones MD, MPH\par Attending Physician\par Hematology Oncology\par [facility] [place]\par [facility]\par

## 2022-07-05 NOTE — ASSESSMENT
[FreeTextEntry1] : ## Relapsed IgG Kappa multiple myeloma \par Diagnosed 2018 s/p RVD x 3 cycles.\par Developed severe skin reaction to revlimid and was supposed to be switched to pomalyst.\par However did not want stem cell transplant and decided to stop follow up\par \par Patient transferred care to us in Feb 2020\par Presented in Feb 2020 with low back pain found to have diffuse bone metastases\par Bone marrow (3/20): > 70% clonal plasma cells\par Hyperdiploidy and gains of chromosomes 3, 5, 9, 11, 15 and 19, as well as, rearrangements of 1p  are recurrently seen in plasma cell disorders.  Complex karyotypic changes are generally associated with an unfavorable clinical course\par FISH myeloma-  Three copies of CCND1 detected (15.5%)\par s/p On daratuumab kyprolis dexamethasone ( 5/5/20- 5/12/22)  - total of Daratumumab # 35 \par advised to continue with Acyclovir 400 mg bid - patient claims on he's on the medication\par -With treatment, he started to do well with weight gained and no longer needing walker. \par -Labs with paraprotein responding to treatment - CR since July 2021. \par PET/CT (7/21) due worsening low back pain, neck pain, and b/l abdominal pain - No acute findings\par \par -Patient with ongoing issues with skipping treatments due to transportation issues  not being compliant with medications, following up with PCP, living at the shelter in [place], and needing our social workers/staffs to reach out to his sister and arranging all his medical care. \par -Due to his poor IV access and fatigue, his treatment is switched to Faspro. \par \par Continue with #2  Faspro + Dec today (6/8/22 - present). \par --Labs are drawn in the office, reviewed, analyzed, and discussed\par - chronic bone and rib pain - to get new bone scan advised. \par -6/2022 bone scan - stable. \par -He is still inquiring about the trials which he's trying to see a hematologist at [facility] for. \par \par #Low back pain  - 2/2 to bone mets. \par 1/2021 MRI Lumbar noted for multiple compression fractures, worse at L4\par Refer to IR for L4 kyphoplasty but patient repeated declined\par Pain stays the same. \par \par #HTN\par He is on Losartan 100 mg and HCTZ 25 mg by his local PCP.Advised to check his BP and follows up with his PCP. \par \par #Bone metastases - \par on Xgeva with last dose on 1/2022 - given today 7/5/22\par Completed Radiation C6-T3 spine, completed 5/20/20\par Stressed to take Ca 600mg bid.\par \par d/w Dr. Quinones \par \par Follow up in 4 weeks for Faspro + Dec\par CBC, CMP, myeloma panel\par \par Contact\par Nadia Robert (sister) - [phone]\par \par

## 2022-08-02 ENCOUNTER — APPOINTMENT (OUTPATIENT)
Dept: HEMATOLOGY ONCOLOGY | Facility: CLINIC | Age: 62
End: 2022-08-02

## 2022-08-23 DIAGNOSIS — C90.02 MULTIPLE MYELOMA IN RELAPSE: ICD-10-CM

## 2022-08-28 NOTE — ASSESSMENT
0700 : Bedside and Verbal shift change report given to Gaye Lai RN & Martha Vail RN (oncoming nurse) by Bernardino Ambriz RN. (offgoing nurse). Report included the following information SBAR, Kardex, Intake/Output, MAR, Recent Results, and Cardiac Rhythm NSR with Bigeminy . 0800 : Patient remains intubated, sedated and restrained. Patient responding to pain. Levo, Vaso and Versed infusing. Patient had bloody sputum coming out of oral cavity, Dr. Deric Crandall made aware. Midline Incision remains clean, dry and intact. All patient needs are addressed at this time. Critical care continues. 0930 : ET Tube advanced by RT 2cm. 1005 : Dr. Deric Crandall made aware of patient's low diastolic pressure with a MAP less than 60. Per Dr. Deric Crandall orders are to titrate Levo with a goal of SBP >95, keep Vaso to help with diastolic BP.     3395 : Reassessment completed; see Flowsheet. Patient remains intubated, sedated and restrained. Titrating Levo down. Vaso and Versed remain infusing. Patient is resting in bed as all needs are addressed. Critical care continues. 1600 : Reassessment completed, See Flowsheet. Patient remains intubated, sedated and restrained. All patient needs are addressed. Critical care continues. 1900 : Bedside and Verbal shift change report given to Lily eNss RN (oncoming nurse) by Ponce Isaac RN (offgoing nurse). Report included the following information SBAR, Kardex, Recent Results, and Cardiac Rhythm NSR with Bigeminy . [FreeTextEntry1] : Relapsed IgG Kappa multiple myeloma \par Diagnosed 2018 s/p RVD x 3 cycles.\par Developed severe skin reaction to revlimid and was supposed to be switched ot pomalyst.\par However did not want stem cell transplant and decided to stop follow up\par Presented Feb 2020 with low back pain found to have diffuse bone metastases\par Bone marrow (3/20): > 70% clonal plasma cells\par Hyperdiploidy and gains of chromosomes 3, 5, 9, 11, 15 and 19, as well as, rearrangements of 1p  are recurrently seen in plasma cell disorders.  Complex karyotypic changes are generally associated with an unfavorable clinical course\par FISH myeloma-  Three copies of CCND1 detected (15.5%)\par \par #On daratuumab kyprolis dexamethasone ( 5/5/20-present) \par Daratumumab # 24  W1 of Kyprolis \par Labs reviewed and discussed\par Acyclovir 400mg bid \par He is now on daratumumab [id number] and kyprolis 3 weeks on 1 week off\par Patient is clinically doing much better since started treatment with weight gained and no longer needing walker\par -FLCR increased to 2.06 but now decreasing with M spike at 0.1 \par to have new MRI of lumbar spine\par -His treatment plans and clinically conditions reassured again, Dr. Quinones also spoke to patient's sister as well. \par \par #depression and fatigue\par -advised to be compliant with  Remeron 15 mg daily \par -encouraged to be more active and seek more social supports (therapy groups or even a part time job if he can).\par \par b/l lower ext edema \par Likely from kyprolis\par No sob. Lung exam normal\par Compression stockings and leg elevation advised\par \par #Low back pain\par Refer to IR for L4 kyphoplasty but patient continues  to refuse.\par Oxycodone 5 mg prn\par \par Bone metastases - \par pet/ct scan denied. bone scan as stated above - to have new bone scan which patient wants to reconsider\par Completed Radiation C6-T3 spine, completed 5/20/20\par continue with Oxycodone 5mg prn.\par Stressed to take Ca 600mg bid.\par Given 4/1/21\par \par #HTN - on Amlodipine 10 mg - advised to take daily and to follow up with Dr. Rodriguez. \par \par d/w Dr. Quinones \par Follow up in 1 week1 for Daratumumab # 24 W1 of Kyprolis \par CBC, CMP, \par \par Contact\par Nadia Robert ([facility]) - [phone]\par \par

## 2022-08-30 ENCOUNTER — APPOINTMENT (OUTPATIENT)
Dept: HEMATOLOGY ONCOLOGY | Facility: CLINIC | Age: 62
End: 2022-08-30

## 2022-10-11 ENCOUNTER — APPOINTMENT (OUTPATIENT)
Dept: HEMATOLOGY ONCOLOGY | Facility: CLINIC | Age: 62
End: 2022-10-11

## 2022-10-24 ENCOUNTER — NON-APPOINTMENT (OUTPATIENT)
Age: 62
End: 2022-10-24

## 2022-10-28 ENCOUNTER — APPOINTMENT (OUTPATIENT)
Dept: HEMATOLOGY ONCOLOGY | Facility: CLINIC | Age: 62
End: 2022-10-28

## 2022-10-28 ENCOUNTER — RESULT REVIEW (OUTPATIENT)
Age: 62
End: 2022-10-28

## 2022-10-28 VITALS
BODY MASS INDEX: 24.17 KG/M2 | TEMPERATURE: 97.4 F | RESPIRATION RATE: 16 BRPM | HEIGHT: 66.93 IN | WEIGHT: 154 LBS | SYSTOLIC BLOOD PRESSURE: 140 MMHG | DIASTOLIC BLOOD PRESSURE: 84 MMHG | HEART RATE: 108 BPM | OXYGEN SATURATION: 98 %

## 2022-10-28 DIAGNOSIS — M54.50 LOW BACK PAIN, UNSPECIFIED: ICD-10-CM

## 2022-10-28 PROCEDURE — 36415 COLL VENOUS BLD VENIPUNCTURE: CPT

## 2022-10-28 PROCEDURE — 99215 OFFICE O/P EST HI 40 MIN: CPT | Mod: 25

## 2022-10-28 RX ORDER — ACYCLOVIR 400 MG/1
400 TABLET ORAL
Qty: 60 | Refills: 3 | Status: ACTIVE | COMMUNITY
Start: 2020-05-19 | End: 1900-01-01

## 2022-10-28 NOTE — CONSULT LETTER
[FreeTextEntry3] : Aubrey Quinones MD, MPH\par Attending Physician\par Hematology Oncology\par [facility] [place]\par [facility]\par

## 2022-10-28 NOTE — ASSESSMENT
[FreeTextEntry1] : ## Relapsed IgG Kappa multiple myeloma \par Diagnosed 2018 s/p RVD x 3 cycles.\par Developed severe skin reaction to revlimid and was supposed to be switched to pomalyst.\par However did not want stem cell transplant and decided to stop follow up\par \par Patient transferred care to us in Feb 2020\par Presented in Feb 2020 with low back pain found to have diffuse bone metastases\par Bone marrow (3/20): > 70% clonal plasma cells\par Hyperdiploidy and gains of chromosomes 3, 5, 9, 11, 15 and 19, as well as, rearrangements of 1p  are recurrently seen in plasma cell disorders.  Complex karyotypic changes are generally associated with an unfavorable clinical course\par FISH myeloma-  Three copies of CCND1 detected (15.5%)\par s/p On daratuumab kyprolis dexamethasone ( 5/5/20- 5/12/22)  - total of Daratumumab # 35 \par advised to continue with Acyclovir 400 mg bid - patient claims on he's on the medication\par -With treatment, he started to do well with weight gained and no longer needing walker. \par -Labs with paraprotein responding to treatment - CR since July 2021. \par PET/CT (7/21) due worsening low back pain, neck pain, and b/l abdominal pain - No acute findings\par \par -Patient with ongoing issues with skipping treatments due to transportation issues  not being compliant with medications, following up with PCP, living at the shelter in [place], and needing our social workers/staffs to reach out to his sister and arranging all his medical care. \par -Due to his poor IV access and fatigue, his treatment was switched to Faspro. \par - s/p   Faspro + Dec today (6/8/22 - 7/5/22) - insurance denied approval due to not meeting [facility] guidinelien protocol (not newlyly diagnosed, combination with cytoxan, Revlimid, ect).  \par - 6/2022 bone scan - stable. \par \par Treatment interrupted due to transportation issues\par He is now back for treatment \par Starting Velcade q2wks (10/28/22 - present) \par Extensive discussion with patient regarding his treatment and plan. Patient agreed with SQ injection only. \par --Labs are drawn in the office, reviewed, analyzed, and discussed\par Now MM labs ordered \par to get Velcade today and re-iterated the importance of Acyclovir as well. \par \par #Low back pain  - 2/2 to bone mets. \par 1/2021 MRI Lumbar noted for multiple compression fractures, worse at L4\par Refer to IR for L4 kyphoplasty but patient repeated declined\par Pain stays the same. \par \par #HTN\par He is on Losartan 100 mg and HCTZ 25 mg by his local PCP.Advised to check his BP and follows up with his PCP. \par \par #Bone metastases - \par on Xgeva with last dose on 1/2022 and  7/5/22\par Completed Radiation C6-T3 spine, completed 5/20/20\par Stressed to take Ca 600mg bid.\par He is due for his Xgeva but patient declined. \par \par d/w Dr. Quinones \par \par Follow up in 2 weeks for Velcade\par CBC, CMP, myeloma panel\par \par Contact\par KerlineRaya Robert (sister) - [phone]\par \par

## 2022-10-28 NOTE — HISTORY OF PRESENT ILLNESS
[de-identified] : Mr. Rolf Robert is 60 year old male with IgG Kappa multiple myeloma transferred care from Dr. Amy Vo. \par \par Patient initially diagnosed with Multiple myeloma in 2018 and received 3 cycles of RVD but developed a severe skin reaction and was switched to Pomalyst/Pomalidomide.   \par After 2 months he was recommended stem cell transplant-> he was completely against transplant and was lost to follow up\par \par In Feb 2020, he developed low back pain radiating down to both hips. CT Pelvis done demonstrating severe joint space narrowing involving the right hip, moderate osteophytic changes on the left. Abnormal appearance of osseous structures of the pelvis with multiple lytic foci throughout the pelvis with a permeative appearance of the cortex of bilateral iliac bones. Mild compression deformity at L4 age indeterminant noted. \par B/l hips pain persisted with 3/2020 X-ray of pelvis noted multifocal lytic lesions throughout the bone.  PT was then by neurosurgery on 3/30/20 who recommended rad onc with MRI on 3/31/20 noted moderate compression fracture deformity involving C7 vertebral body with retropulsion of bone and mild bony central canal stenosis without cord compression or epidural extension. A completely collapsed T2 vertebral body with mild retropulsion of bone into spinal canal with moderate central canal stenosis with indentation of the ventral cord. No significant epidural tumor extension present and no edema noted within the adjacent spinal cord. Moderate pathologic compression fracture of T6 without retropulsion of bone/cord compression. Mild compression fracture of T7 without retropulsion of bone. Mild of T8 with mild retropulsion of bone and mild central canal stenosis. Moderate pathologic compression fracture of T10 without retropulsion/compression. Minimal pathologic compression of inferior endplate of T11. Mild pathologic compression involving T12 and L3. Moderate pathologic compression fracture involving L4 stable compared to prior CT. Mild retropulsion of bone present.\par \par Patient had bone marrow biopsy performed among other MM tests which demonstrated plasma cell neoplasm > 70%. \par Hyperdiploidy and gains of chromosomes 3, 5, 9, 11, 15 and 19, as well as, rearrangements of 1p  are recurrently seen in plasma cell disorders.  Complex karyotypic changes are generally associated with an unfavorable clinical course\par FISH myeloma-  Three copies of CCND1 detected (15.5%)\par \par 4/2/20 skeletal survey demonstrated scattered lytic lesions in the calvarium, spine, and pelvis without fracture or dislocation. Multilevel spondylosis and vertebral wedge compression deformities noted.\par \par Patient was then seen by radiation oncologist Dr. Rica Villalpando on 4/24/20.\par \par \par 1/6/2021 MRI L spine\par Improvement in abnormal bone marrow signal consistent with multiple myeloma.\par Multiple pathologic compression fractures. No acute compression fracture. Worsening of L4\par compression fracture since previous MRI 3/31/2020 with increasing retropulsion of bone resulting in\par right lateral recess stenosis and right L4-L5 foramen stenosis. There is a fluid "cleft" in the L4\par vertebral body suggesting underlying avascular necrosis.\par Although the compression fractures are pathologic fractures consistent with underlying myeloma,\par there is no evidence of tumor extending through vertebral body cortex into the epidural space.\par \par Patient has skipped his treatment since 5/6/21 due to transportation issues.. His sister has not scheduled him for Pet/Ct scan. He still has persistent left abdominal pain, low back pain, and lately noticing he has been losing muscle around his shoulders with more curvature of upper thoracic and cervical spines. \par He stopped Amlodipine in the last few days due to running out of medication. \par \par PET/CT (7/21)\par Reviewed and discussed, Has chronic bone lesions, no acute findings\par \par Patient skipped treatment from 11/17/21 - 1/12/21 due to transportation issues. He is no longer living with his sister but she's still helping him arranging transportation for all his doctor's visits. \par  [de-identified] : Patient is here for follow up  - now on Velcade (10/28/22) \par s/p Faspro + Dec  # 2  (6/8/22 - 7/5/22 )\par s/p daratumumab kyprolis dexamethasone - (5/5/20 - 5/11/2022) \par \par Was last seen July 2022. [place] [facility]\par He has lost to follow up since 7/5/22 due to not transportation issues. \par He is frustrated that his cancer is not 'cured' and believes the government and cancer center ([facility]) have the 'medications to cure for all the cancers' but not publicize it. \par Continues to have generalized bone pain \par He is still at the shelter \par \par 6/22/22 bone scan \par There is a newly evident focal site of increased tracer localization in the left temporal calvarium.\par There has been a decrease in the intensity of abnormal increased tracer localization at previously evident sites of abnormality at T8 and T9.\par There has been no gross change in numerous other sites of abnormal increased tracer localization in the bony skeleton.\par \par Weight = 120->121-> 124 lbs -> 128 -> 126 lbs -->124 -> 127  ->134 lbs -> 131 lbs ->136 lbs -> 146 lbs ->147lbs -> 146 ->147lbs ->150lbs ->148lbs ->149lbs -> 144 lbs -> 147 -> 140lbs -> 147 -> 145 lbs -> 148lbs -> 143 lbs -> 144lbs -> 139 lbs -> 141lbs -> 149 lbs -> 143 lbs -> 144 lbs -> 148 lbs -> 153 lbs  -> 147 lbs -> 147 lbs -> 150 lbs -> 145 lbs   -> 154 lbs \par \par

## 2022-11-10 ENCOUNTER — NON-APPOINTMENT (OUTPATIENT)
Age: 62
End: 2022-11-10

## 2022-11-11 ENCOUNTER — RESULT REVIEW (OUTPATIENT)
Age: 62
End: 2022-11-11

## 2022-11-11 ENCOUNTER — APPOINTMENT (OUTPATIENT)
Dept: HEMATOLOGY ONCOLOGY | Facility: CLINIC | Age: 62
End: 2022-11-11

## 2022-11-11 VITALS
HEIGHT: 66.93 IN | SYSTOLIC BLOOD PRESSURE: 140 MMHG | OXYGEN SATURATION: 98 % | RESPIRATION RATE: 16 BRPM | WEIGHT: 163 LBS | DIASTOLIC BLOOD PRESSURE: 91 MMHG | BODY MASS INDEX: 25.58 KG/M2 | TEMPERATURE: 97.2 F | HEART RATE: 102 BPM

## 2022-11-11 PROCEDURE — 36415 COLL VENOUS BLD VENIPUNCTURE: CPT

## 2022-11-11 PROCEDURE — 99214 OFFICE O/P EST MOD 30 MIN: CPT | Mod: 25

## 2022-11-11 RX ORDER — DOXYCYCLINE 100 MG/1
100 CAPSULE ORAL
Qty: 20 | Refills: 0 | Status: COMPLETED | COMMUNITY
Start: 2022-10-07

## 2022-11-11 NOTE — CONSULT LETTER
[Courtesy Letter:] : I had the pleasure of seeing your patient, [unfilled], in my office today. [FreeTextEntry3] : Aubrey Quinones MD, MPH\par Attending Physician\par Hematology Oncology\par [facility] [place]\par [facility]\par

## 2022-11-11 NOTE — HISTORY OF PRESENT ILLNESS
[de-identified] : Mr. Rolf Robert is 60 year old male with IgG Kappa multiple myeloma transferred care from Dr. Amy Vo. \par \par Patient initially diagnosed with Multiple myeloma in 2018 and received 3 cycles of RVD but developed a severe skin reaction and was switched to Pomalyst/Pomalidomide.   \par After 2 months he was recommended stem cell transplant-> he was completely against transplant and was lost to follow up\par \par In Feb 2020, he developed low back pain radiating down to both hips. CT Pelvis done demonstrating severe joint space narrowing involving the right hip, moderate osteophytic changes on the left. Abnormal appearance of osseous structures of the pelvis with multiple lytic foci throughout the pelvis with a permeative appearance of the cortex of bilateral iliac bones. Mild compression deformity at L4 age indeterminant noted. \par B/l hips pain persisted with 3/2020 X-ray of pelvis noted multifocal lytic lesions throughout the bone.  PT was then by neurosurgery on 3/30/20 who recommended rad onc with MRI on 3/31/20 noted moderate compression fracture deformity involving C7 vertebral body with retropulsion of bone and mild bony central canal stenosis without cord compression or epidural extension. A completely collapsed T2 vertebral body with mild retropulsion of bone into spinal canal with moderate central canal stenosis with indentation of the ventral cord. No significant epidural tumor extension present and no edema noted within the adjacent spinal cord. Moderate pathologic compression fracture of T6 without retropulsion of bone/cord compression. Mild compression fracture of T7 without retropulsion of bone. Mild of T8 with mild retropulsion of bone and mild central canal stenosis. Moderate pathologic compression fracture of T10 without retropulsion/compression. Minimal pathologic compression of inferior endplate of T11. Mild pathologic compression involving T12 and L3. Moderate pathologic compression fracture involving L4 stable compared to prior CT. Mild retropulsion of bone present.\par \par Patient had bone marrow biopsy performed among other MM tests which demonstrated plasma cell neoplasm > 70%. \par Hyperdiploidy and gains of chromosomes 3, 5, 9, 11, 15 and 19, as well as, rearrangements of 1p  are recurrently seen in plasma cell disorders.  Complex karyotypic changes are generally associated with an unfavorable clinical course\par FISH myeloma-  Three copies of CCND1 detected (15.5%)\par \par 4/2/20 skeletal survey demonstrated scattered lytic lesions in the calvarium, spine, and pelvis without fracture or dislocation. Multilevel spondylosis and vertebral wedge compression deformities noted.\par \par Patient was then seen by radiation oncologist Dr. Rica Villalpando on 4/24/20.\par \par \par 1/6/2021 MRI L spine\par Improvement in abnormal bone marrow signal consistent with multiple myeloma.\par Multiple pathologic compression fractures. No acute compression fracture. Worsening of L4\par compression fracture since previous MRI 3/31/2020 with increasing retropulsion of bone resulting in\par right lateral recess stenosis and right L4-L5 foramen stenosis. There is a fluid "cleft" in the L4\par vertebral body suggesting underlying avascular necrosis.\par Although the compression fractures are pathologic fractures consistent with underlying myeloma,\par there is no evidence of tumor extending through vertebral body cortex into the epidural space.\par \par Patient has skipped his treatment since 5/6/21 due to transportation issues.. His sister has not scheduled him for Pet/Ct scan. He still has persistent left abdominal pain, low back pain, and lately noticing he has been losing muscle around his shoulders with more curvature of upper thoracic and cervical spines. \par He stopped Amlodipine in the last few days due to running out of medication. \par \par PET/CT (7/21)\par Reviewed and discussed, Has chronic bone lesions, no acute findings\par \par Patient skipped treatment from 11/17/21 - 1/12/21 due to transportation issues. He is no longer living with his sister but she's still helping him arranging transportation for all his doctor's visits. \par  [de-identified] : Patient is here for follow up  - now on Velcade (10/28/22 - present) \par off treatment in btw\par s/p Faspro + Dec  # 2  (6/8/22 - 7/5/22 )\par s/p daratumumab kyprolis dexamethasone - (5/5/20 - 5/11/2022) \par \par Other than intermittent low back pain, he's doing well. No new complaints. \par \par 6/22/22 bone scan \par There is a newly evident focal site of increased tracer localization in the left temporal calvarium.\par There has been a decrease in the intensity of abnormal increased tracer localization at previously evident sites of abnormality at T8 and T9.\par There has been no gross change in numerous other sites of abnormal increased tracer localization in the bony skeleton.\par \par Weight = 120->121-> 124 lbs -> 128 -> 126 lbs -->124 -> 127  ->134 lbs -> 131 lbs ->136 lbs -> 146 lbs ->147lbs -> 146 ->147lbs ->150lbs ->148lbs ->149lbs -> 144 lbs -> 147 -> 140lbs -> 147 -> 145 lbs -> 148lbs -> 143 lbs -> 144lbs -> 139 lbs -> 141lbs -> 149 lbs -> 143 lbs -> 144 lbs -> 148 lbs -> 153 lbs  -> 147 lbs -> 147 lbs -> 150 lbs -> 145 lbs   -> 154 lbs \par \par

## 2022-11-11 NOTE — ASSESSMENT
[FreeTextEntry1] : ## Relapsed IgG Kappa multiple myeloma \par Diagnosed 2018 s/p RVD x 3 cycles.\par Developed severe skin reaction to revlimid and was supposed to be switched to pomalyst.\par However did not want stem cell transplant and decided to stop follow up\par \par Patient transferred care to us in Feb 2020\par Presented in Feb 2020 with low back pain found to have diffuse bone metastases\par Bone marrow (3/20): > 70% clonal plasma cells\par Hyperdiploidy and gains of chromosomes 3, 5, 9, 11, 15 and 19, as well as, rearrangements of 1p  are recurrently seen in plasma cell disorders.  Complex karyotypic changes are generally associated with an unfavorable clinical course\par FISH myeloma-  Three copies of CCND1 detected (15.5%)\par s/p On daratuumab kyprolis dexamethasone ( 5/5/20- 5/12/22)  - total of Daratumumab # 35 \par advised to continue with Acyclovir 400 mg bid - patient claims on he's on the medication\par -With treatment, he started to do well with weight gained and no longer needing walker. \par -Labs with paraprotein responding to treatment - CR since July 2021. \par PET/CT (7/21) due worsening low back pain, neck pain, and b/l abdominal pain - No acute findings\par \par -Patient with ongoing issues with skipping treatments due to transportation issues  not being compliant with medications, following up with PCP, living at the shelter in [place], and needing our social workers/staffs to reach out to his sister and arranging all his medical care. \par -Due to his poor IV access and fatigue, his treatment was switched to Faspro. \par - s/p   Faspro + Dec today (6/8/22 - 7/5/22) - insurance denied approval due to not meeting [employer] guidinelien protocol (not newly diagnosed, combination with cytoxan, Revlimid, ect).  \par - 6/2022 bone scan - stable. \par \par Treatment interrupted due to transportation issues again\par He is now back for treatment \par Starting Velcade q2wks (10/28/22 - present) \par --Labs are drawn in the office, reviewed, analyzed, and discussed\par -paraprotein remains stable. \par to get Velcade and Acyclovir \par \par #Low back pain  - 2/2 to bone mets. \par 1/2021 MRI Lumbar noted for multiple compression fractures, worse at L4\par Refer to IR for L4 kyphoplasty but patient repeated declined\par Pain changes in severity \par \par #HTN\par He is on Losartan 100 mg and HCTZ 25 mg by his local PCP.Advised to check his BP and follows up with his PCP. \par \par #Bone metastases - \par on Xgeva with last dose on 1/2022 - he has been declined to get Xgeva. \par Completed Radiation C6-T3 spine, completed 5/20/20\par Stressed to take Ca 600 mg bid.\par \par d/w Dr. Quinones \par Follow up in 2 weeks for Velcade\par CBC, CMP, myeloma panel\par \par Contact\par Nadia Robert (sister) - [phone]\par \par

## 2022-11-18 ENCOUNTER — NON-APPOINTMENT (OUTPATIENT)
Age: 62
End: 2022-11-18

## 2022-11-25 ENCOUNTER — APPOINTMENT (OUTPATIENT)
Dept: HEMATOLOGY ONCOLOGY | Facility: CLINIC | Age: 62
End: 2022-11-25

## 2022-11-25 ENCOUNTER — RESULT REVIEW (OUTPATIENT)
Age: 62
End: 2022-11-25

## 2022-11-25 VITALS
RESPIRATION RATE: 16 BRPM | WEIGHT: 163 LBS | TEMPERATURE: 98.6 F | SYSTOLIC BLOOD PRESSURE: 164 MMHG | BODY MASS INDEX: 25.58 KG/M2 | HEIGHT: 66.93 IN | DIASTOLIC BLOOD PRESSURE: 99 MMHG | OXYGEN SATURATION: 96 % | HEART RATE: 107 BPM

## 2022-11-25 PROCEDURE — 99214 OFFICE O/P EST MOD 30 MIN: CPT | Mod: 25

## 2022-11-25 NOTE — RESULTS/DATA
[FreeTextEntry1] : Labs reviewed, analyzed and discussed\par \par 6/22/22 bone scan \par There is a newly evident focal site of increased tracer localization in the left temporal calvarium.\par There has been a decrease in the intensity of abnormal increased tracer localization at previously evident sites of abnormality at T8 and T9.\par There has been no gross change in numerous other sites of abnormal increased tracer localization in the bony skeleton.

## 2022-11-25 NOTE — CONSULT LETTER
[Courtesy Letter:] : I had the pleasure of seeing your patient, [unfilled], in my office today. [FreeTextEntry3] : Aubrey Quinones MD, MPH\par Attending Physician\par Hematology Oncology\par [facility] [place]\par [facility]\par

## 2022-11-25 NOTE — ASSESSMENT
[FreeTextEntry1] : ## Relapsed IgG Kappa multiple myeloma \par Diagnosed 2018 s/p RVD x 3 cycles.\par Developed severe skin reaction to revlimid and was supposed to be switched to pomalyst.\par However did not want stem cell transplant and decided to stop follow up\par \par Patient transferred care to us in Feb 2020\par Presented in Feb 2020 with low back pain found to have diffuse bone metastases\par Bone marrow (3/20): > 70% clonal plasma cells\par Hyperdiploidy and gains of chromosomes 3, 5, 9, 11, 15 and 19, as well as, rearrangements of 1p  are recurrently seen in plasma cell disorders.  Complex karyotypic changes are generally associated with an unfavorable clinical course\par FISH myeloma-  Three copies of CCND1 detected (15.5%)\par s/p On daratuumab kyprolis dexamethasone ( 5/5/20- 5/12/22)  - total of Daratumumab # 35 \par advised to continue with Acyclovir 400 mg bid - patient claims on he's on the medication\par -With treatment, he started to do well with weight gained and no longer needing walker. \par -Labs with paraprotein responding to treatment - CR since July 2021. \par PET/CT (7/21) due worsening low back pain, neck pain, and b/l abdominal pain - No acute findings\par \par -Patient with ongoing issues with skipping treatments due to transportation issues  not being compliant with medications, following up with PCP, living at the shelter in [place], and needing our social workers/staffs to reach out to his sister and arranging all his medical care. \par -Due to his poor IV access and fatigue, his treatment was switched to Faspro. \par - s/p   Faspro + Dec today (6/8/22 - 7/5/22) - insurance denied approval due to not meeting [employer] guidinelien protocol (not newly diagnosed, combination with cytoxan, Revlimid, ect).  \par - 6/2022 bone scan - stable. \par \par Treatment interrupted due to transportation issues again\par He is now back for treatment \par Starting Velcade q2wks (10/28/22 - present) \par --Labs are drawn in the office, reviewed, analyzed, and discussed\par -paraprotein remains stable. \par to get Velcade and Acyclovir (complaint with Acyclovir advised). \par \par #Low back pain  - 2/2 to bone mets. \par 1/2021 MRI Lumbar noted for multiple compression fractures, worse at L4\par Refer to IR for L4 kyphoplasty but patient repeated declined\par Pain changes in severity \par \par #HTN\par He is on Losartan 100 mg and HCTZ 25 mg by his local PCP.Advised to check his BP and follows up with his PCP. \par \par #Bone metastases - \par on Xgeva with last dose on 1/2022 - he has been declined to get Xgeva. \par Completed Radiation C6-T3 spine, completed 5/20/20\par Stressed to take Ca 600 mg bid.\par \par #social issues\par He is currently the shelter and his sister Camille is helping him with arranging for transportation. \par \par d/w Dr. Quinones \par Follow up in 2 weeks for Velcade\par CBC, CMP, myeloma panel\par \par Contact\par Nadia Robert (sister) - [phone]\par \par

## 2022-11-25 NOTE — HISTORY OF PRESENT ILLNESS

## 2023-01-16 NOTE — ASSESSMENT
[FreeTextEntry1] : Relapsed IgG Kappa multiple myeloma \par Diagnosed 2018 s/p RVD x 3 cycles.\par Developed severe skin reaction to revlimid and was supposed to be switched ot pomalyst.\par However did not want stem cell transplant and decided to stop follow up\par Presented Feb 2020 with low back pain found to have diffuse bone metastases\par Bone marrow (3/20): > 70% clonal plasma cells\par Hyperdiploidy and gains of chromosomes 3, 5, 9, 11, 15 and 19, as well as, rearrangements of 1p  are recurrently seen in plasma cell disorders.  Complex karyotypic changes are generally associated with an unfavorable clinical course\par FISH myeloma-  Three copies of CCND1 detected (15.5%)\par \par Here for daratumumab kyprolis dexamethasone #11 ( 5/5/20-present)\par W1 kyprolis weekly x 3 week - W2 today canceled due to positive Covid testing.\par Tolerating well\par Overall has excellent improvement in clinical and lab parameters\par Although he is not entirely convinced\par Labs reviewed and discussed\par Paraproteins trending down\par Acyclovir 400mg bid. \par \par #positive Covid - patient advised to be quanrantined x 14 days and to seek immediate attention if any problems arise at home. \par \par #HTN with headaches - stressed the importance of taking his Amlopidine as advised. \par Back pain - Rib pain - Pet/Ct scan denied - will try to order pan-MRI or bone scan\par Radiaiton PRN\par \par Physical deconditioning - Refer to physical therapy\par \par Anemia\par Likely from multiple myeloma with a component of B12 deficiency\par continue with B12 sublingual daily\par Counts gradually improving\par \par Lower and upper back pain \par Bone metastases\par Completed Radiation C6-T3 spine, completed 5/20/20\par continue with Oxycodone 5mg prn.\par He declines dental evaluation. given Xgeva 7/30/20 - stressed to take Ca 600mg bid. \par \par to return in two weeks after being quarantined for 14 days after negative Covid testing\par \par CBC, CMP\par  myeloma panel (monthly)\par \par Contact\par Nadia Robert (sister) - [phone]\par \par  The patient is a 34y Female complaining of eye vision change.

## 2023-01-18 ENCOUNTER — NON-APPOINTMENT (OUTPATIENT)
Age: 63
End: 2023-01-18

## 2023-01-20 ENCOUNTER — NON-APPOINTMENT (OUTPATIENT)
Age: 63
End: 2023-01-20

## 2023-02-01 ENCOUNTER — APPOINTMENT (OUTPATIENT)
Dept: HEMATOLOGY ONCOLOGY | Facility: CLINIC | Age: 63
End: 2023-02-01
Payer: MEDICAID

## 2023-02-01 ENCOUNTER — RESULT REVIEW (OUTPATIENT)
Age: 63
End: 2023-02-01

## 2023-02-01 VITALS
BODY MASS INDEX: 25.9 KG/M2 | HEART RATE: 94 BPM | HEIGHT: 66.93 IN | WEIGHT: 165 LBS | DIASTOLIC BLOOD PRESSURE: 100 MMHG | OXYGEN SATURATION: 96 % | TEMPERATURE: 97 F | SYSTOLIC BLOOD PRESSURE: 153 MMHG | RESPIRATION RATE: 18 BRPM

## 2023-02-01 DIAGNOSIS — I10 ESSENTIAL (PRIMARY) HYPERTENSION: ICD-10-CM

## 2023-02-01 DIAGNOSIS — C90.01 MULTIPLE MYELOMA IN REMISSION: ICD-10-CM

## 2023-02-01 PROCEDURE — 99215 OFFICE O/P EST HI 40 MIN: CPT | Mod: 25

## 2023-02-01 NOTE — HISTORY OF PRESENT ILLNESS

## 2023-02-01 NOTE — CONSULT LETTER
[Courtesy Letter:] : I had the pleasure of seeing your patient, [unfilled], in my office today. [FreeTextEntry3] : Aubrey Quinones MD, MPH\par Attending Physician\par Hematology Oncology\par [facility] [place]\par [facility]\par

## 2023-02-01 NOTE — ASSESSMENT
[FreeTextEntry1] : ## Relapsed IgG Kappa multiple myeloma \par Diagnosed 2018 s/p RVD x 3 cycles.\par Developed severe skin reaction to revlimid and was supposed to be switched to pomalyst.\par However did not want stem cell transplant and decided to stop follow up\par \par Patient transferred care to us in Feb 2020\par Presented in Feb 2020 with low back pain found to have diffuse bone metastases\par Bone marrow (3/20): > 70% clonal plasma cells\par Hyperdiploidy and gains of chromosomes 3, 5, 9, 11, 15 and 19, as well as, rearrangements of 1p  are recurrently seen in plasma cell disorders.  Complex karyotypic changes are generally associated with an unfavorable clinical course\par FISH myeloma-  Three copies of CCND1 detected (15.5%)\par s/p On daratuumab kyprolis dexamethasone ( 5/5/20- 5/12/22)  - total of Daratumumab # 35 \par advised to continue with Acyclovir 400 mg bid - patient claims on he's on the medication\par -With treatment, he started to do well with weight gained and no longer needing walker. \par -Labs with paraprotein responding to treatment - CR since July 2021. \par PET/CT (7/21) due worsening low back pain, neck pain, and b/l abdominal pain - No acute findings\par -Due to his poor IV access and fatigue, his treatment was switched to Faspro. \par - s/p   Faspro + Dec today (6/8/22 - 7/5/22) - insurance denied approval due to not meeting [employer] guidinelien protocol (not newly diagnosed, combination with cytoxan, Revlimid, ect).  \par - 6/2022 bone scan - stable. \par \par Currently on Velcade q2wks (10/28/22 - present) \par Patient continues to be noncompliant with chemo treatment due to transportation - working with our  [facility] to arrange for transportation. \par Skipped 11/25/22 - 2/1/23\par Advised to be on scheduled with treamtent.\par -Labs are drawn in the office, reviewed, analyzed, and discussed\par paraprotein remains stable \par proceed with treatment. \par \par #Low back pain  - 2/2 to bone mets. \par 1/2021 MRI Lumbar noted for multiple compression fractures, worse at L4\par Refer to IR for L4 kyphoplasty but patient repeated declined\par Pain changes in severity \par \par #HTN\par He is on Losartan 100 mg and HCTZ 25 mg \par Stressed the importance of being compliant with meds.  \par \par #Bone metastases - \par on Xgeva with last dose on 1/2022 - he has been declined to get Xgeva. \par Completed Radiation C6-T3 spine, completed 5/20/20\par Stressed to take Ca 600 mg bid.\par \par #social issues\par He is currently the shelter and his sister Camille is helping him with arranging for transportation along with our  [facility] \par \par d/w Dr. Quinones \par Follow up in 2 weeks for Velcade\par CBC, CMP, myeloma panel\par \par Contact\par Nadia Robert (sister) - [phone]\par \par

## 2023-02-15 ENCOUNTER — RESULT REVIEW (OUTPATIENT)
Age: 63
End: 2023-02-15

## 2023-02-15 ENCOUNTER — APPOINTMENT (OUTPATIENT)
Dept: HEMATOLOGY ONCOLOGY | Facility: CLINIC | Age: 63
End: 2023-02-15
Payer: MEDICAID

## 2023-02-15 VITALS
RESPIRATION RATE: 16 BRPM | HEIGHT: 66.93 IN | TEMPERATURE: 99 F | DIASTOLIC BLOOD PRESSURE: 93 MMHG | OXYGEN SATURATION: 98 % | BODY MASS INDEX: 25.05 KG/M2 | WEIGHT: 159.6 LBS | HEART RATE: 68 BPM | SYSTOLIC BLOOD PRESSURE: 132 MMHG

## 2023-02-15 PROCEDURE — 99214 OFFICE O/P EST MOD 30 MIN: CPT | Mod: 25

## 2023-02-15 NOTE — HISTORY OF PRESENT ILLNESS

## 2023-02-15 NOTE — ASSESSMENT
[FreeTextEntry1] : ## Relapsed IgG Kappa multiple myeloma \par Diagnosed 2018 s/p RVD x 3 cycles.\par Developed severe skin reaction to revlimid and was supposed to be switched to pomalyst.\par However did not want stem cell transplant and decided to stop follow up\par \par Patient transferred care to us in Feb 2020\par Presented in Feb 2020 with low back pain found to have diffuse bone metastases\par Bone marrow (3/20): > 70% clonal plasma cells\par Hyperdiploidy and gains of chromosomes 3, 5, 9, 11, 15 and 19, as well as, rearrangements of 1p  are recurrently seen in plasma cell disorders.  Complex karyotypic changes are generally associated with an unfavorable clinical course\par FISH myeloma-  Three copies of CCND1 detected (15.5%)\par s/p On daratuumab kyprolis dexamethasone ( 5/5/20- 5/12/22)  - total of Daratumumab # 35 \par advised to continue with Acyclovir 400 mg bid - patient claims on he's on the medication\par -With treatment, he started to do well with weight gained and no longer needing walker. \par -Labs with paraprotein responding to treatment - CR since July 2021. \par PET/CT (7/21) due worsening low back pain, neck pain, and b/l abdominal pain - No acute findings\par -Due to his poor IV access and fatigue, his treatment was switched to Faspro. \par - s/p   Faspro + Dec today (6/8/22 - 7/5/22) - insurance denied approval due to not meeting [employer] guidinelien protocol (not newly diagnosed, combination with cytoxan, Revlimid, ect).  \par - 6/2022 bone scan - stable. \par \par Currently on Velcade q2wks (10/28/22 - present) \par Patient continues to be noncompliant with chemo treatment due to transportation - working with our  [facility] to arrange for transportation. \par Skipped 11/25/22 - 2/1/23\par Now back to every 2 weeks treatment.\par continues to have ongoing bone pain that come and goes - currently asymptomatic. \par -Labs are drawn in the office, reviewed, analyzed, and discussed\par paraprotein remains stable \par proceed with treatment. \par \par #Low back pain  - 2/2 to bone mets. \par 1/2021 MRI Lumbar noted for multiple compression fractures, worse at L4\par Refer to IR for L4 kyphoplasty but patient repeated declined\par Pain changes in severity \par \par #HTN\par He is on Losartan 100 mg and HCTZ 25 mg \par Stressed the importance of being compliant with meds.  \par \par #Bone metastases - \par on Xgeva with last dose on 1/2022 - he has been declined to get Xgeva. \par Completed Radiation C6-T3 spine, completed 5/20/20\par Stressed to take Ca 600 mg bid.\par \par #social issues\par He is currently the shelter and his sister Camille is helping him with arranging for transportation along with our  [facility] \par \par d/w Dr. Quinones \par Follow up in 2 weeks for Velcade\par CBC, CMP, myeloma panel\par \par Contact\par Nadia Robert (sister) - [phone]\par \par

## 2023-02-15 NOTE — CONSULT LETTER
[Courtesy Letter:] : I had the pleasure of seeing your patient, [unfilled], in my office today. [FreeTextEntry3] : Aubrey Quinones MD, MPH\par Attending Physician\par Hematology Oncology\par [facility]\par [facility]\par

## 2023-02-28 ENCOUNTER — NON-APPOINTMENT (OUTPATIENT)
Age: 63
End: 2023-02-28

## 2023-03-01 ENCOUNTER — RESULT REVIEW (OUTPATIENT)
Age: 63
End: 2023-03-01

## 2023-03-01 ENCOUNTER — APPOINTMENT (OUTPATIENT)
Dept: HEMATOLOGY ONCOLOGY | Facility: CLINIC | Age: 63
End: 2023-03-01
Payer: MEDICAID

## 2023-03-01 VITALS
HEART RATE: 55 BPM | OXYGEN SATURATION: 95 % | TEMPERATURE: 98.6 F | RESPIRATION RATE: 17 BRPM | BODY MASS INDEX: 26.03 KG/M2 | SYSTOLIC BLOOD PRESSURE: 127 MMHG | HEIGHT: 66 IN | WEIGHT: 162 LBS | DIASTOLIC BLOOD PRESSURE: 83 MMHG

## 2023-03-01 DIAGNOSIS — R19.7 DIARRHEA, UNSPECIFIED: ICD-10-CM

## 2023-03-01 DIAGNOSIS — R11.10 VOMITING, UNSPECIFIED: ICD-10-CM

## 2023-03-01 PROCEDURE — 99214 OFFICE O/P EST MOD 30 MIN: CPT | Mod: 25

## 2023-03-01 NOTE — ASSESSMENT
[FreeTextEntry1] : ## Relapsed IgG Kappa multiple myeloma \par Diagnosed 2018 s/p RVD x 3 cycles.\par Developed severe skin reaction to revlimid and was supposed to be switched to pomalyst.\par However did not want stem cell transplant and decided to stop follow up\par \par Patient transferred care to us in Feb 2020\par Presented in Feb 2020 with low back pain found to have diffuse bone metastases\par Bone marrow (3/20): > 70% clonal plasma cells\par Hyperdiploidy and gains of chromosomes 3, 5, 9, 11, 15 and 19, as well as, rearrangements of 1p  are recurrently seen in plasma cell disorders.  Complex karyotypic changes are generally associated with an unfavorable clinical course\par FISH myeloma-  Three copies of CCND1 detected (15.5%)\par s/p On daratuumab kyprolis dexamethasone ( 5/5/20- 5/12/22)  - total of Daratumumab # 35 \par advised to continue with Acyclovir 400 mg bid - patient claims on he's on the medication\par -With treatment, he started to do well with weight gained and no longer needing walker. \par -Labs with paraprotein responding to treatment - CR since July 2021. \par PET/CT (7/21) due worsening low back pain, neck pain, and b/l abdominal pain - No acute findings\par -Due to his poor IV access and fatigue, his treatment was switched to Faspro. \par - s/p   Faspro + Dec today (6/8/22 - 7/5/22) - insurance denied approval due to not meeting [employer] guidinelien protocol (not newly diagnosed, combination with cytoxan, Revlimid, ect).  \par - 6/2022 bone scan - stable. \par \par Currently on Velcade q2wks (10/28/22 - present) \par Patient continues to be noncompliant with chemo treatment due to transportation - working with our  [facility] to arrange for all this treatment  transportations. \par Skipped 11/25/22 - 2/1/23\par Now back to every 2 weeks treatment.\par -Labs are drawn in the office, reviewed, analyzed, and discussed\par paraprotein remains stable \par He now has poor PO intake due to diarrhea, abdominal pain, vomiting in the last two days. Reports of having. WBC is elevated but afebrile. Advised to get IVH  He declined hydration, chemo treatment, and just wants to go home since his ride is waiting for him outside. Imodium called into local pharmacy.  Advised to go to ED for symptoms management, he declined too. \par \par #Low back pain  - 2/2 to bone mets. \par 1/2021 MRI Lumbar noted for multiple compression fractures, worse at L4\par Refer to IR for L4 kyphoplasty but patient repeated declined\par Pain changes in severity \par \par #HTN\par He is on Losartan 100 mg and HCTZ 25 mg \par Stressed the importance of being compliant with meds.  \par \par #Bone metastases - \par on Xgeva with last dose on 1/2022 - he has been declined to get Xgeva. \par Completed Radiation C6-T3 spine, completed 5/20/20\par Stressed to take Ca 600 mg bid.\par \par #social issues\par He is currently the shelter and his sister Camille is helping him with arranging for transportation along with our  [facility] \par \par d/w Dr. Quinones \par Follow up in 2 weeks for Velcade\par CBC, CMP, myeloma panel\par \par Contact\par CharlaRobertoRaya Robert (sister) - [phone]\par \par

## 2023-03-01 NOTE — CONSULT LETTER
[Courtesy Letter:] : I had the pleasure of seeing your patient, [unfilled], in my office today. [FreeTextEntry3] : Aubrey Quinones MD, MPH\par Attending Physician\par Hematology Oncology\par [facility] [place]\par [facility]\par

## 2023-03-01 NOTE — HISTORY OF PRESENT ILLNESS
[de-identified] : Mr. Rolf Robert is 60 year old male with IgG Kappa multiple myeloma transferred care from Dr. Amy Vo. \par \par Patient initially diagnosed with Multiple myeloma in 2018 and received 3 cycles of RVD but developed a severe skin reaction and was switched to Pomalyst/Pomalidomide.   \par After 2 months he was recommended stem cell transplant-> he was completely against transplant and was lost to follow up\par \par In Feb 2020, he developed low back pain radiating down to both hips. CT Pelvis done demonstrating severe joint space narrowing involving the right hip, moderate osteophytic changes on the left. Abnormal appearance of osseous structures of the pelvis with multiple lytic foci throughout the pelvis with a permeative appearance of the cortex of bilateral iliac bones. Mild compression deformity at L4 age indeterminant noted. \par B/l hips pain persisted with 3/2020 X-ray of pelvis noted multifocal lytic lesions throughout the bone.  PT was then by neurosurgery on 3/30/20 who recommended rad onc with MRI on 3/31/20 noted moderate compression fracture deformity involving C7 vertebral body with retropulsion of bone and mild bony central canal stenosis without cord compression or epidural extension. A completely collapsed T2 vertebral body with mild retropulsion of bone into spinal canal with moderate central canal stenosis with indentation of the ventral cord. No significant epidural tumor extension present and no edema noted within the adjacent spinal cord. Moderate pathologic compression fracture of T6 without retropulsion of bone/cord compression. Mild compression fracture of T7 without retropulsion of bone. Mild of T8 with mild retropulsion of bone and mild central canal stenosis. Moderate pathologic compression fracture of T10 without retropulsion/compression. Minimal pathologic compression of inferior endplate of T11. Mild pathologic compression involving T12 and L3. Moderate pathologic compression fracture involving L4 stable compared to prior CT. Mild retropulsion of bone present.\par \par Patient had bone marrow biopsy performed among other MM tests which demonstrated plasma cell neoplasm > 70%. \par Hyperdiploidy and gains of chromosomes 3, 5, 9, 11, 15 and 19, as well as, rearrangements of 1p  are recurrently seen in plasma cell disorders.  Complex karyotypic changes are generally associated with an unfavorable clinical course\par FISH myeloma-  Three copies of CCND1 detected (15.5%)\par \par 4/2/20 skeletal survey demonstrated scattered lytic lesions in the calvarium, spine, and pelvis without fracture or dislocation. Multilevel spondylosis and vertebral wedge compression deformities noted.\par \par Patient was then seen by radiation oncologist Dr. Rica Villalpando on 4/24/20.\par \par \par 1/6/2021 MRI L spine\par Improvement in abnormal bone marrow signal consistent with multiple myeloma.\par Multiple pathologic compression fractures. No acute compression fracture. Worsening of L4\par compression fracture since previous MRI 3/31/2020 with increasing retropulsion of bone resulting in\par right lateral recess stenosis and right L4-L5 foramen stenosis. There is a fluid "cleft" in the L4\par vertebral body suggesting underlying avascular necrosis.\par Although the compression fractures are pathologic fractures consistent with underlying myeloma,\par there is no evidence of tumor extending through vertebral body cortex into the epidural space.\par \par Patient has skipped his treatment since 5/6/21 due to transportation issues.. His sister has not scheduled him for Pet/Ct scan. He still has persistent left abdominal pain, low back pain, and lately noticing he has been losing muscle around his shoulders with more curvature of upper thoracic and cervical spines. \par He stopped Amlodipine in the last few days due to running out of medication. \par \par PET/CT (7/21)\par Reviewed and discussed, Has chronic bone lesions, no acute findings\par \par Patient skipped treatment from 11/17/21 - 1/12/21 due to transportation issues. He is no longer living with his sister but she's still helping him arranging transportation for all his doctor's visits. \par  [de-identified] : Patient is here for follow up  - now on Velcade (10/28/22 - present) \par off treatment in btw\par s/p Faspro + Dec  # 2  (6/8/22 - 7/5/22 )\par s/p daratumumab kyprolis dexamethasone - (5/5/20 - 5/11/2022) \par \par Patient reports of not doing well in the last two days with uncontrolled diarrhea and vomiting, not able to keep anything down but refused hydration or any further work up other than labs. \par \par Weight = 120->121-> 124 lbs -> 128 -> 126 lbs -->124 -> 127  ->134 lbs -> 131 lbs ->136 lbs -> 146 lbs ->147lbs -> 146 ->147lbs ->150lbs ->148lbs ->149lbs -> 144 lbs -> 147 -> 140lbs -> 147 -> 145 lbs -> 148lbs -> 143 lbs -> 144lbs -> 139 lbs -> 141lbs -> 149 lbs -> 143 lbs -> 144 lbs -> 148 lbs -> 153 lbs  -> 147 lbs -> 147 lbs -> 150 lbs -> 145 lbs   -> 154 lbs -> 163 lbs \par \par

## 2023-03-15 ENCOUNTER — RESULT REVIEW (OUTPATIENT)
Age: 63
End: 2023-03-15

## 2023-03-15 ENCOUNTER — NON-APPOINTMENT (OUTPATIENT)
Age: 63
End: 2023-03-15

## 2023-03-15 ENCOUNTER — APPOINTMENT (OUTPATIENT)
Dept: HEMATOLOGY ONCOLOGY | Facility: CLINIC | Age: 63
End: 2023-03-15
Payer: MEDICAID

## 2023-03-15 VITALS
SYSTOLIC BLOOD PRESSURE: 135 MMHG | OXYGEN SATURATION: 97 % | RESPIRATION RATE: 17 BRPM | HEIGHT: 66 IN | DIASTOLIC BLOOD PRESSURE: 91 MMHG | WEIGHT: 162.13 LBS | TEMPERATURE: 97.8 F | BODY MASS INDEX: 26.06 KG/M2 | HEART RATE: 65 BPM

## 2023-03-15 DIAGNOSIS — Z51.11 ENCOUNTER FOR ANTINEOPLASTIC CHEMOTHERAPY: ICD-10-CM

## 2023-03-15 PROCEDURE — 99214 OFFICE O/P EST MOD 30 MIN: CPT | Mod: 25

## 2023-03-15 NOTE — ASSESSMENT
[FreeTextEntry1] : ## Relapsed IgG Kappa multiple myeloma \par Diagnosed 2018 s/p RVD x 3 cycles.\par Developed severe skin reaction to revlimid and was supposed to be switched to pomalyst.\par However did not want stem cell transplant and decided to stop follow up\par \par Patient transferred care to us in Feb 2020\par Presented in Feb 2020 with low back pain found to have diffuse bone metastases\par Bone marrow (3/20): > 70% clonal plasma cells\par Hyperdiploidy and gains of chromosomes 3, 5, 9, 11, 15 and 19, as well as, rearrangements of 1p  are recurrently seen in plasma cell disorders.  Complex karyotypic changes are generally associated with an unfavorable clinical course\par FISH myeloma-  Three copies of CCND1 detected (15.5%)\par s/p On daratuumab kyprolis dexamethasone ( 5/5/20- 5/12/22)  - total of Daratumumab # 35 \par advised to continue with Acyclovir 400 mg bid - patient claims on he's on the medication\par -With treatment, he started to do well with weight gained and no longer needing walker. \par -Labs with paraprotein responding to treatment - CR since July 2021. \par PET/CT (7/21) due worsening low back pain, neck pain, and b/l abdominal pain - No acute findings\par -Due to his poor IV access and fatigue, his treatment was switched to Faspro. \par - s/p   Faspro + Dec today (6/8/22 - 7/5/22) - insurance denied approval due to not meeting NCCN guidinelien protocol (not newly diagnosed, combination with cytoxan, Revlimid, ect).  \par - 6/2022 bone scan - stable. \par \par Currently on Velcade q2wks (10/28/22 - present) \par Patient continues to be noncompliant with chemo treatment due to transportation - working with our  Quin Phipps to arrange for all this treatment  transportations. \par Skipped 11/25/22 - 2/1/23\par Now back to every 2 weeks treatment.\par -Labs are drawn in the office, reviewed, analyzed, and discussed\par paraprotein remains stable with no monoclonal gammoapthy identified and M spike unable to quantitate. \par -He has chronic mild rib and low back pain - no pain meds needed and hypercalcemia - declined bone agent due to teeth pain and decaying - needing lots of dental procedures but he has yet follow up with his dentist. Advised to avoid high rich calcium diet and hold all calcium supplement.\par -continue with Velcade. \par \par #Low back pain  - 2/2 to bone mets. \par 1/2021 MRI Lumbar noted for multiple compression fractures, worse at L4\par Refer to IR for L4 kyphoplasty but patient repeated declined\par Pain changes in severity \par \par #HTN\par He is on Losartan 100 mg and HCTZ 25 mg \par Stressed the importance of being compliant with meds.  \par \par #Bone metastases - \par on Xgeva with last dose on 1/2022 - he has been declined to get Xgeva and now with extensive dental issues (teeth pain and decay) - advised to follow up with his dentist. \par Completed Radiation C6-T3 spine, completed 5/20/20\par to hold Ca + vitamin D supplement due to hypercalcemia. \par \par #social issues\par He is currently the shelter and his sister Camille is helping him with arranging for transportation along with our  Quin Phipps \par \par d/w Dr. Quinones \par Follow up in 2 weeks for Velcade\par CBC, CMP, myeloma panel\par \par Contact\par Nadia Robert (sister) - [phone]\par \par

## 2023-03-15 NOTE — HISTORY OF PRESENT ILLNESS

## 2023-03-15 NOTE — CONSULT LETTER
[Courtesy Letter:] : I had the pleasure of seeing your patient, [unfilled], in my office today. [FreeTextEntry3] : Aubrey Quinones MD, MPH\par Attending Physician\par Hematology Oncology\par [facility] [place]\par [facility]\par

## 2023-03-20 DIAGNOSIS — C79.51 SECONDARY MALIGNANT NEOPLASM OF BONE: ICD-10-CM

## 2023-03-20 DIAGNOSIS — Z79.899 OTHER LONG TERM (CURRENT) DRUG THERAPY: ICD-10-CM

## 2023-03-20 DIAGNOSIS — E53.8 DEFICIENCY OF OTHER SPECIFIED B GROUP VITAMINS: ICD-10-CM

## 2023-03-20 DIAGNOSIS — R11.0 NAUSEA: ICD-10-CM

## 2023-03-27 ENCOUNTER — NON-APPOINTMENT (OUTPATIENT)
Age: 63
End: 2023-03-27

## 2023-03-29 ENCOUNTER — APPOINTMENT (OUTPATIENT)
Dept: HEMATOLOGY ONCOLOGY | Facility: CLINIC | Age: 63
End: 2023-03-29

## 2023-03-29 ENCOUNTER — RESULT REVIEW (OUTPATIENT)
Age: 63
End: 2023-03-29

## 2023-03-29 VITALS
DIASTOLIC BLOOD PRESSURE: 84 MMHG | HEIGHT: 66 IN | SYSTOLIC BLOOD PRESSURE: 130 MMHG | BODY MASS INDEX: 26.7 KG/M2 | OXYGEN SATURATION: 99 % | TEMPERATURE: 98.6 F | HEART RATE: 92 BPM | RESPIRATION RATE: 16 BRPM | WEIGHT: 166.13 LBS

## 2023-04-11 DIAGNOSIS — C90.00 MULTIPLE MYELOMA NOT HAVING ACHIEVED REMISSION: ICD-10-CM

## 2023-04-12 ENCOUNTER — APPOINTMENT (OUTPATIENT)
Dept: HEMATOLOGY ONCOLOGY | Facility: CLINIC | Age: 63
End: 2023-04-12

## 2023-07-19 NOTE — REASON FOR VISIT
[Follow-Up Visit] : a follow-up visit for [FreeTextEntry2] : Multiple Myeloma PHYSICAL EXAM:  GENERAL: in NAD, Sitting comfortable in bed, in no respiratory distress  HEAD: Atraumatic, no varela's sign, no periorbital ecchymosis   EYES: PERRL, EOMs intact b/l w/out deficits, +mild scleral icterus  ENMT: Moist membranes, no anterior/posterior, or supraclavicular LAD. b/l nares with +crusted blood, no active bleeding  CHEST/LUNG: CTAB no wheezes/rhonchi/rales  HEART: RRR no murmur/gallops/rubs  ABDOMEN: +BS, soft, NT, ND  EXTREMITIES: No LE edema, +2 radial pulses b/l  NERVOUS SYSTEM:  A&Ox4, No motor deficits or sensory deficits to b/l UEs  Heme/LYMPH: No ecchymosis or bruising or LAD  SKIN:  +diffuse rash on anterior chest and back, small spider angiomata

## 2024-05-22 ENCOUNTER — EMERGENCY (EMERGENCY)
Facility: HOSPITAL | Age: 64
LOS: 1 days | Discharge: HOME | End: 2024-05-22
Admitting: EMERGENCY MEDICINE
Payer: MEDICAID

## 2024-05-22 VITALS
HEART RATE: 108 BPM | OXYGEN SATURATION: 95 % | WEIGHT: 160.94 LBS | DIASTOLIC BLOOD PRESSURE: 84 MMHG | RESPIRATION RATE: 20 BRPM | SYSTOLIC BLOOD PRESSURE: 133 MMHG | TEMPERATURE: 98 F

## 2024-05-22 PROCEDURE — 99285 EMERGENCY DEPT VISIT HI MDM: CPT

## 2024-05-22 PROCEDURE — 93010 ELECTROCARDIOGRAM REPORT: CPT

## 2024-05-22 NOTE — ED ADULT TRIAGE NOTE - CHIEF COMPLAINT QUOTE
cough  and flu like symptoms for a week. Pt was seen by his PCP Yesterday who gave him antibiotic . But  pt felt dizzy and fell lat Saturday. He hit hs head on the wall in the bathroom. So PCP wanted him to come to the ER for cardiac work up. Pt also says " I need to see a / as I am homeless and I need a place to stay. "Pt appears comfortable. Will obtain EKG. pat history of HTN, Type 2 DM, Multiple Myeloma

## 2024-05-22 NOTE — ED ADULT TRIAGE NOTE - WEIGHT METHOD
Detail Level: Detailed Depth Of Biopsy: dermis Was A Bandage Applied: Yes Size Of Lesion In Cm: 0 Biopsy Type: H and E Biopsy Method: Personna blade Anesthesia Type: 0.05% lidocaine without epinephrine Anesthesia Volume In Cc: 1 Hemostasis: Aluminum Chloride Wound Care: Petrolatum Dressing: pressure dressing with telfa Destruction After The Procedure: No Type Of Destruction Used: Curettage stated Curettage Text: The wound bed was treated with curettage after the biopsy was performed. Cryotherapy Text: The wound bed was treated with cryotherapy after the biopsy was performed. Electrodesiccation Text: The wound bed was treated with electrodesiccation after the biopsy was performed. Electrodesiccation And Curettage Text: The wound bed was treated with electrodesiccation and curettage after the biopsy was performed. Silver Nitrate Text: The wound bed was treated with silver nitrate after the biopsy was performed. Lab: [id number] Lab Facility:  Consent: Written consent was obtained and risks were reviewed including but not limited to scarring, infection, bleeding, scabbing, incomplete removal, nerve damage and allergy to anesthesia. Post-Care Instructions: I reviewed with the patient in detail post-care instructions. Patient is to keep the biopsy site dry overnight. Gentle cleansing daily.  Apply petroleum ointment daily until healed. Patient may apply hydrogen peroxide soaks to remove any crusting. Notification Instructions: Patient will be notified of biopsy results. However, patient instructed to call the office if not contacted within 2 weeks. Billing Type: Third-Party Bill Information: Selecting Yes will display possible errors in your note based on the variables you have selected. This validation is only offered as a suggestion for you. PLEASE NOTE THAT THE VALIDATION TEXT WILL BE REMOVED WHEN YOU FINALIZE YOUR NOTE. IF YOU WANT TO FAX A PRELIMINARY NOTE YOU WILL NEED TO TOGGLE THIS TO 'NO' IF YOU DO NOT WANT IT IN YOUR FAXED NOTE.

## 2024-05-23 LAB
ALBUMIN SERPL ELPH-MCNC: 3.2 G/DL — LOW (ref 3.3–5)
ALP SERPL-CCNC: 129 U/L — HIGH (ref 40–120)
ALT FLD-CCNC: SIGNIFICANT CHANGE UP U/L (ref 4–41)
ANION GAP SERPL CALC-SCNC: 14 MMOL/L — SIGNIFICANT CHANGE UP (ref 7–14)
AST SERPL-CCNC: 68 U/L — HIGH (ref 4–40)
BASOPHILS # BLD AUTO: 0.01 K/UL — SIGNIFICANT CHANGE UP (ref 0–0.2)
BASOPHILS NFR BLD AUTO: 0.1 % — SIGNIFICANT CHANGE UP (ref 0–2)
BILIRUB SERPL-MCNC: 0.4 MG/DL — SIGNIFICANT CHANGE UP (ref 0.2–1.2)
BUN SERPL-MCNC: 17 MG/DL — SIGNIFICANT CHANGE UP (ref 7–23)
CALCIUM SERPL-MCNC: 9.2 MG/DL — SIGNIFICANT CHANGE UP (ref 8.4–10.5)
CHLORIDE SERPL-SCNC: 101 MMOL/L — SIGNIFICANT CHANGE UP (ref 98–107)
CO2 SERPL-SCNC: 22 MMOL/L — SIGNIFICANT CHANGE UP (ref 22–31)
CREAT SERPL-MCNC: 0.75 MG/DL — SIGNIFICANT CHANGE UP (ref 0.5–1.3)
EGFR: 101 ML/MIN/1.73M2 — SIGNIFICANT CHANGE UP
EOSINOPHIL # BLD AUTO: 0.02 K/UL — SIGNIFICANT CHANGE UP (ref 0–0.5)
EOSINOPHIL NFR BLD AUTO: 0.3 % — SIGNIFICANT CHANGE UP (ref 0–6)
FLUAV AG NPH QL: SIGNIFICANT CHANGE UP
FLUBV AG NPH QL: SIGNIFICANT CHANGE UP
GLUCOSE SERPL-MCNC: 130 MG/DL — HIGH (ref 70–99)
HCT VFR BLD CALC: 37 % — LOW (ref 39–50)
HGB BLD-MCNC: 12.3 G/DL — LOW (ref 13–17)
IANC: 4.97 K/UL — SIGNIFICANT CHANGE UP (ref 1.8–7.4)
IMM GRANULOCYTES NFR BLD AUTO: 0.4 % — SIGNIFICANT CHANGE UP (ref 0–0.9)
LYMPHOCYTES # BLD AUTO: 2.29 K/UL — SIGNIFICANT CHANGE UP (ref 1–3.3)
LYMPHOCYTES # BLD AUTO: 29.7 % — SIGNIFICANT CHANGE UP (ref 13–44)
MCHC RBC-ENTMCNC: 28.1 PG — SIGNIFICANT CHANGE UP (ref 27–34)
MCHC RBC-ENTMCNC: 33.2 GM/DL — SIGNIFICANT CHANGE UP (ref 32–36)
MCV RBC AUTO: 84.7 FL — SIGNIFICANT CHANGE UP (ref 80–100)
MONOCYTES # BLD AUTO: 0.39 K/UL — SIGNIFICANT CHANGE UP (ref 0–0.9)
MONOCYTES NFR BLD AUTO: 5.1 % — SIGNIFICANT CHANGE UP (ref 2–14)
NEUTROPHILS # BLD AUTO: 4.97 K/UL — SIGNIFICANT CHANGE UP (ref 1.8–7.4)
NEUTROPHILS NFR BLD AUTO: 64.4 % — SIGNIFICANT CHANGE UP (ref 43–77)
NRBC # BLD: 0 /100 WBCS — SIGNIFICANT CHANGE UP (ref 0–0)
NRBC # FLD: 0 K/UL — SIGNIFICANT CHANGE UP (ref 0–0)
NT-PROBNP SERPL-SCNC: 51 PG/ML — SIGNIFICANT CHANGE UP
PLATELET # BLD AUTO: 648 K/UL — HIGH (ref 150–400)
POTASSIUM SERPL-MCNC: SIGNIFICANT CHANGE UP MMOL/L (ref 3.5–5.3)
POTASSIUM SERPL-SCNC: SIGNIFICANT CHANGE UP MMOL/L (ref 3.5–5.3)
PROT SERPL-MCNC: SIGNIFICANT CHANGE UP G/DL (ref 6–8.3)
RBC # BLD: 4.37 M/UL — SIGNIFICANT CHANGE UP (ref 4.2–5.8)
RBC # FLD: 13.6 % — SIGNIFICANT CHANGE UP (ref 10.3–14.5)
RSV RNA NPH QL NAA+NON-PROBE: SIGNIFICANT CHANGE UP
SARS-COV-2 RNA SPEC QL NAA+PROBE: SIGNIFICANT CHANGE UP
SODIUM SERPL-SCNC: 137 MMOL/L — SIGNIFICANT CHANGE UP (ref 135–145)
TROPONIN T, HIGH SENSITIVITY RESULT: 6 NG/L — SIGNIFICANT CHANGE UP
WBC # BLD: 7.71 K/UL — SIGNIFICANT CHANGE UP (ref 3.8–10.5)
WBC # FLD AUTO: 7.71 K/UL — SIGNIFICANT CHANGE UP (ref 3.8–10.5)

## 2024-05-23 PROCEDURE — 71046 X-RAY EXAM CHEST 2 VIEWS: CPT | Mod: 26

## 2024-05-23 NOTE — ED PROVIDER NOTE - PATIENT PORTAL LINK FT
You can access the FollowMyHealth Patient Portal offered by [facility] by registering at the following website: [email or web address]. By joining [facility]’s FollowMyHealth portal, you will also be able to view your health information using other applications (apps) compatible with our system.

## 2024-05-23 NOTE — ED PROVIDER NOTE - PROGRESS NOTE DETAILS
KATHY Cuellar: Patient seen resting comfortably and NAD. Patient reports improvement of symptoms. Cxr: negative. Labs: no acute findings at this time. trop negative. BNP : negative. Will advise patient outpatient follow up with cardiologist for further management. Labs/imaging discussed and reviewed with patient. Patient HDS, ambulating well and tolerating PO. Return precautions given upon discharge.

## 2024-05-23 NOTE — ED PROVIDER NOTE - NSFOLLOWUPINSTRUCTIONS_ED_ALL_ED_FT
You were seen in our department for ***  Follow up with your PMD in 48-72 hours for further monitoring.  Follow up with *** within 1 week for further evaluation.  if you develop any chest pain, dizziness, high fevers, weakness, numbness, tingling, vision changes, or any worsening symptoms return to our ED for evaluation. You were seen in our department for Cough   Follow up with your Primary Care Doctor in 48-72 hours for further monitoring.  Follow up with Cardiologist within 1 week for further evaluation.  if you develop any chest pain, dizziness, high fevers, weakness, numbness, tingling, vision changes, or any worsening symptoms return to our ED for evaluation.

## 2024-05-23 NOTE — ED PROVIDER NOTE - OBJECTIVE STATEMENT
65 y/o male with PMHx of HTN, TIIDM, Multiple Myeloma presents to the ED c/o cough and flu like symptoms x 1 week. Patient states he went to his PCP yesterday in which they prescribed him antibiotics. Patient states that "this is the best he felt in the last 2 days." Patient PCP sent patient to ED for Cardiac workup due to patient had a fall Saturday in which he felt dizzy. Patient states he was washing his face in which he lifted his head and felt lightheaded, and fell. Patient states hit his the back of his head, but denies headache, visual changes, or any other concerning symptoms at this time. Patient denies blood thinners. Patient denies chest pain, shortness of breath, fever, chills, n/v, diarrhea, or any other concerning symptoms  at this time.

## 2024-05-23 NOTE — ED ADULT NURSE NOTE - OBJECTIVE STATEMENT
Pt received to intake room 5 a&ox4, ambulatory, on room air coming to ED c/o flu-like symptoms. Pt states he has been having symptoms x1 week. PCP prescribed abx. Pt also coming to ER for social work. Pt respirations even and unlabored, chest rise and fall equal b/l. Pt denies chest pain, HA, SOB, dizziness, N/V/D, fever/chills. #22g placed to LAC, labs collected and sent. Pt pending XR. Stretcher in lowest position, call bell within reach, pt safety maintained.

## 2024-05-23 NOTE — ED PROVIDER NOTE - CLINICAL SUMMARY MEDICAL DECISION MAKING FREE TEXT BOX
65 y/o male with PMHx of HTN, TIIDM, Multiple Myeloma presents to the ED c/o cough and flu like symptoms x 1 week. Patient states he went to his PCP yesterday in which they prescribed him antibiotics. Patient states that "this is the best he felt in the last 2 days." Patient PCP sent patient to ED for Cardiac workup due to patient had a fall Saturday in which he felt dizzy. Patient states he was washing his face in which he lifted his head and felt lightheaded, and fell. Patient states hit his the back of his head, but denies headache, visual changes, or any other concerning symptoms at this time. Patient denies blood thinners. Patient denies chest pain, shortness of breath, fever, chills, n/v, diarrhea, or any other concerning symptoms  at this time. Physical exam as above. EKG nonischemic, no STEMI/NSTEMI    Impression: will r/o PNA vs viral illness. Will obtain trop/ BNP for cardiac workup to r/o ACS    Plan:   labs   xray   EKG 63 y/o male with PMHx of HTN, TIIDM, Multiple Myeloma presents to the ED c/o cough and flu like symptoms x 1 week. Patient states he went to his PCP yesterday in which they prescribed him antibiotics. Patient states that "this is the best he felt in the last 2 days." Patient PCP sent patient to ED for Cardiac workup due to patient had a fall Saturday in which he felt dizzy. Patient states he was washing his face in which he lifted his head and felt lightheaded, and fell. Patient states hit his the back of his head, but denies headache, visual changes, or any other concerning symptoms at this time. Patient denies blood thinners. Patient denies chest pain, shortness of breath, fever, chills, n/v, diarrhea, or any other concerning symptoms  at this time. Physical exam as above. EKG nonischemic, no STEMI/NSTEMI    Impression: will r/o PNA vs viral illness. Will obtain trop/ BNP for cardiac workup to r/o ACS    Plan:    Cxr      labs     sw   labs   xray   EKG

## 2024-05-23 NOTE — PROVIDER CONTACT NOTE (OTHER) - ACTION/TREATMENT ORDERED:
Provided shelter [facility] information; advised pt to follow up to begin the proces of securing more stable housing.

## 2024-05-23 NOTE — PROVIDER CONTACT NOTE (OTHER) - ASSESSMENT
Met with pt at bedside-pt states he has no permanent place to stay; has been staying with friends and has been on the streets.  He reports he is interested in housing resources.

## 2024-05-23 NOTE — ED PROVIDER NOTE - PHYSICAL EXAMINATION
Vitals signed reviewed  General: Well appearing, NAD  HEENT: NC/AT, PERRLA, EOM intact with no pain, MMM  Neck: full ROM, no trachea deviation  Heart: RRR, normal s1/s2  Lungs: CTAB, normal WOB, no wheezing, rales, or rhonchi  Abdomen: Soft, non-distended, non-tender, no CVA tenderness , rebounding or guarding  MSK:    normal gait, full ROM with no tenderness, no swelling, redness, instability

## 2024-06-24 NOTE — REASON FOR VISIT
Problem: Adult Inpatient Plan of Care  Goal: Plan of Care Review  Outcome: Ongoing, Progressing  Goal: Absence of Hospital-Acquired Illness or Injury  Outcome: Ongoing, Progressing  Intervention: Identify and Manage Fall Risk  Intervention: Prevent Skin Injury  Intervention: Prevent and Manage VTE (Venous Thromboembolism) Risk  Intervention: Prevent Infection  Goal: Optimal Comfort and Wellbeing  Outcome: Ongoing, Progressing  Intervention: Monitor Pain and Promote Comfort  Intervention: Provide Person-Centered Care     Problem: Fluid Imbalance (Pneumonia)  Goal: Fluid Balance  Outcome: Ongoing, Progressing     Problem: Infection (Pneumonia)  Goal: Resolution of Infection Signs and Symptoms  Outcome: Ongoing, Progressing     Problem: Confusion Acute  Goal: Optimal Cognitive Function  Outcome: Ongoing, Progressing   Goal Outcome Evaluation:  Plan of Care Reviewed With: patient                                            [Follow-Up Visit] : a follow-up visit for [FreeTextEntry2] : Multiple Myeloma

## 2024-11-22 NOTE — REVIEW OF SYSTEMS
[FreeTextEntry2] : Review of systems negative except as outlined in HPI Skin normal color for race, warm, dry and intact. No evidence of rash.

## 2025-03-20 NOTE — ED ADULT TRIAGE NOTE - INTERNATIONAL TRAVEL
Drink plenty of fluids.  Medications as directed.  Follow-up tomorrow as scheduled.  Go to the emergency department for any other symptoms or concerns deemed emergent.  
No

## 2025-07-21 NOTE — PHYSICAL THERAPY INITIAL EVALUATION ADULT - DISCHARGE DISPOSITION, PT EVAL
Patient seen in anticoagulation clinic. Reviewed past INR and dose with patient. Denies any missed doses of warfarin. Patient denies any changes in medications or health. Reports vitamin K intake has been consistent. Reviewed INR goal.  INR 3.5.  Will decrease current Warfarin dosing and recheck INR in 2 weeks.  Patient reminded to call office with any changes in medications or health. Warfarin dosed per protocol. Supervising Clinician Dr HELEN Fairbanks.  
home w/ home PT/for Home Safety Evaluation & Functional Mobility Training